# Patient Record
Sex: FEMALE | Race: WHITE | NOT HISPANIC OR LATINO | Employment: OTHER | URBAN - METROPOLITAN AREA
[De-identification: names, ages, dates, MRNs, and addresses within clinical notes are randomized per-mention and may not be internally consistent; named-entity substitution may affect disease eponyms.]

---

## 2019-09-20 ENCOUNTER — OFFICE VISIT (OUTPATIENT)
Dept: FAMILY MEDICINE CLINIC | Facility: CLINIC | Age: 69
End: 2019-09-20

## 2019-09-20 VITALS
TEMPERATURE: 98.9 F | BODY MASS INDEX: 22.68 KG/M2 | HEART RATE: 92 BPM | RESPIRATION RATE: 16 BRPM | SYSTOLIC BLOOD PRESSURE: 120 MMHG | OXYGEN SATURATION: 96 % | HEIGHT: 63 IN | DIASTOLIC BLOOD PRESSURE: 82 MMHG | WEIGHT: 128 LBS

## 2019-09-20 DIAGNOSIS — E03.9 ACQUIRED HYPOTHYROIDISM: ICD-10-CM

## 2019-09-20 DIAGNOSIS — E78.2 MIXED HYPERLIPIDEMIA: ICD-10-CM

## 2019-09-20 DIAGNOSIS — M89.9 DISORDER OF BONE: ICD-10-CM

## 2019-09-20 DIAGNOSIS — J43.9 PULMONARY EMPHYSEMA, UNSPECIFIED EMPHYSEMA TYPE (HCC): ICD-10-CM

## 2019-09-20 DIAGNOSIS — Z23 NEED FOR INFLUENZA VACCINATION: Primary | ICD-10-CM

## 2019-09-20 DIAGNOSIS — L94.2 CALCINOSIS CUTIS: ICD-10-CM

## 2019-09-20 DIAGNOSIS — Z13.21 ENCOUNTER FOR VITAMIN DEFICIENCY SCREENING: ICD-10-CM

## 2019-09-20 PROBLEM — M81.0 OSTEOPOROSIS WITHOUT CURRENT PATHOLOGICAL FRACTURE: Status: ACTIVE | Noted: 2019-03-29

## 2019-09-20 PROBLEM — R60.9 EDEMA: Status: ACTIVE | Noted: 2018-08-06

## 2019-09-20 LAB
ALBUMIN SERPL-MCNC: 4.2 G/DL (ref 3.5–4.8)
ALBUMIN/GLOB SERPL: 1.4 G/DL (ref 1–1.7)
ALP SERPL-CCNC: 53 U/L (ref 32–91)
ALT SERPL W P-5'-P-CCNC: 19 U/L (ref 14–54)
ANION GAP SERPL CALCULATED.3IONS-SCNC: 15.8 MMOL/L (ref 5–15)
ARTICHOKE IGE QN: 105 MG/DL (ref 0–100)
AST SERPL-CCNC: 28 U/L (ref 15–41)
BILIRUB SERPL-MCNC: 0.6 MG/DL (ref 0.3–1.2)
BUN BLD-MCNC: 9 MG/DL (ref 8–20)
BUN/CREAT SERPL: 15 (ref 5.4–26.2)
CALCIUM SPEC-SCNC: 9.3 MG/DL (ref 8.9–10.3)
CHLORIDE SERPL-SCNC: 98 MMOL/L (ref 101–111)
CHOLEST SERPL-MCNC: 166 MG/DL
CO2 SERPL-SCNC: 28 MMOL/L (ref 22–32)
CREAT BLD-MCNC: 0.6 MG/DL (ref 0.4–1)
GFR SERPL CREATININE-BSD FRML MDRD: 99 ML/MIN/1.73
GLOBULIN UR ELPH-MCNC: 2.9 GM/DL (ref 2.5–3.8)
GLUCOSE BLD-MCNC: 99 MG/DL (ref 65–99)
HDLC SERPL QL: 3.25
HDLC SERPL-MCNC: 51 MG/DL
LDLC/HDLC SERPL: 1.9 {RATIO}
POTASSIUM BLD-SCNC: 4.8 MMOL/L (ref 3.6–5.1)
PROT SERPL-MCNC: 7.1 G/DL (ref 6.1–7.9)
SODIUM BLD-SCNC: 137 MMOL/L (ref 136–144)
T4 FREE SERPL-MCNC: 1.35 NG/DL (ref 0.58–1.64)
TRIGL SERPL-MCNC: 90 MG/DL
TSH SERPL DL<=0.05 MIU/L-ACNC: 0.99 UIU/ML (ref 0.34–5.6)
VLDLC SERPL-MCNC: 18 MG/DL

## 2019-09-20 PROCEDURE — 84439 ASSAY OF FREE THYROXINE: CPT | Performed by: FAMILY MEDICINE

## 2019-09-20 PROCEDURE — G0008 ADMIN INFLUENZA VIRUS VAC: HCPCS | Performed by: FAMILY MEDICINE

## 2019-09-20 PROCEDURE — 82306 VITAMIN D 25 HYDROXY: CPT | Performed by: FAMILY MEDICINE

## 2019-09-20 PROCEDURE — 36415 COLL VENOUS BLD VENIPUNCTURE: CPT | Performed by: FAMILY MEDICINE

## 2019-09-20 PROCEDURE — 80053 COMPREHEN METABOLIC PANEL: CPT | Performed by: FAMILY MEDICINE

## 2019-09-20 PROCEDURE — 84443 ASSAY THYROID STIM HORMONE: CPT | Performed by: FAMILY MEDICINE

## 2019-09-20 PROCEDURE — 90653 IIV ADJUVANT VACCINE IM: CPT | Performed by: FAMILY MEDICINE

## 2019-09-20 PROCEDURE — 99203 OFFICE O/P NEW LOW 30 MIN: CPT | Performed by: FAMILY MEDICINE

## 2019-09-20 PROCEDURE — 80061 LIPID PANEL: CPT | Performed by: FAMILY MEDICINE

## 2019-09-20 RX ORDER — HYDROCODONE BITARTRATE AND ACETAMINOPHEN 7.5; 325 MG/1; MG/1
1 TABLET ORAL EVERY 6 HOURS PRN
COMMUNITY
Start: 2019-09-12 | End: 2019-09-20 | Stop reason: SDUPTHER

## 2019-09-20 RX ORDER — LEVOTHYROXINE SODIUM 0.1 MG/1
1 TABLET ORAL DAILY
COMMUNITY
Start: 2018-12-14 | End: 2019-10-24 | Stop reason: SDUPTHER

## 2019-09-20 RX ORDER — OMEPRAZOLE 20 MG/1
1 CAPSULE, DELAYED RELEASE ORAL DAILY
COMMUNITY
Start: 2019-06-18 | End: 2019-10-24 | Stop reason: SDUPTHER

## 2019-09-20 RX ORDER — ALBUTEROL SULFATE 90 UG/1
AEROSOL, METERED RESPIRATORY (INHALATION)
Refills: 0 | COMMUNITY
Start: 2019-09-09 | End: 2019-12-20 | Stop reason: SDUPTHER

## 2019-09-20 RX ORDER — HYDROCODONE BITARTRATE AND ACETAMINOPHEN 7.5; 325 MG/1; MG/1
1 TABLET ORAL EVERY 6 HOURS PRN
Qty: 120 TABLET | Refills: 0 | Status: SHIPPED | OUTPATIENT
Start: 2019-09-20 | End: 2019-10-21

## 2019-09-20 RX ORDER — FEXOFENADINE HCL 180 MG/1
1 TABLET ORAL DAILY
COMMUNITY
End: 2019-10-24

## 2019-09-20 RX ORDER — SIMVASTATIN 10 MG
1 TABLET ORAL NIGHTLY
COMMUNITY
Start: 2018-12-14 | End: 2019-10-24 | Stop reason: SDUPTHER

## 2019-09-23 LAB — 25(OH)D3 SERPL-MCNC: 56.6 NG/ML (ref 30–100)

## 2019-09-23 RX ORDER — MULTIVIT-MIN/IRON/FOLIC ACID/K 18-600-40
2000 CAPSULE ORAL DAILY
Qty: 30 CAPSULE | Refills: 3
Start: 2019-09-23 | End: 2019-12-20

## 2019-10-24 ENCOUNTER — OFFICE VISIT (OUTPATIENT)
Dept: FAMILY MEDICINE CLINIC | Facility: CLINIC | Age: 69
End: 2019-10-24

## 2019-10-24 VITALS
DIASTOLIC BLOOD PRESSURE: 85 MMHG | HEART RATE: 93 BPM | BODY MASS INDEX: 22.68 KG/M2 | WEIGHT: 128 LBS | RESPIRATION RATE: 18 BRPM | OXYGEN SATURATION: 94 % | SYSTOLIC BLOOD PRESSURE: 122 MMHG | HEIGHT: 63 IN | TEMPERATURE: 98 F

## 2019-10-24 DIAGNOSIS — Z87.891 PERSONAL HISTORY OF NICOTINE DEPENDENCE: ICD-10-CM

## 2019-10-24 DIAGNOSIS — Z12.2 ENCOUNTER FOR SCREENING FOR LUNG CANCER: ICD-10-CM

## 2019-10-24 DIAGNOSIS — M53.3 SACROILIAC JOINT PAIN: ICD-10-CM

## 2019-10-24 DIAGNOSIS — Z12.31 ENCOUNTER FOR SCREENING MAMMOGRAM FOR BREAST CANCER: ICD-10-CM

## 2019-10-24 DIAGNOSIS — J06.9 ACUTE URI: Primary | ICD-10-CM

## 2019-10-24 PROCEDURE — 99213 OFFICE O/P EST LOW 20 MIN: CPT | Performed by: FAMILY MEDICINE

## 2019-10-24 RX ORDER — SIMVASTATIN 10 MG
10 TABLET ORAL NIGHTLY
Qty: 90 TABLET | Refills: 3 | Status: SHIPPED | OUTPATIENT
Start: 2019-10-24 | End: 2019-12-20 | Stop reason: SDUPTHER

## 2019-10-24 RX ORDER — HYDROCODONE BITARTRATE AND ACETAMINOPHEN 7.5; 325 MG/1; MG/1
1 TABLET ORAL EVERY 6 HOURS PRN
Qty: 120 TABLET | Refills: 0 | Status: SHIPPED | OUTPATIENT
Start: 2019-10-24 | End: 2019-10-24 | Stop reason: SDUPTHER

## 2019-10-24 RX ORDER — HYDROCODONE BITARTRATE AND ACETAMINOPHEN 7.5; 325 MG/1; MG/1
1 TABLET ORAL EVERY 6 HOURS PRN
Qty: 120 TABLET | Refills: 0 | Status: SHIPPED | OUTPATIENT
Start: 2019-10-24 | End: 2019-12-20 | Stop reason: SDUPTHER

## 2019-10-24 RX ORDER — FEXOFENADINE HCL AND PSEUDOEPHEDRINE HCI 60; 120 MG/1; MG/1
1 TABLET, EXTENDED RELEASE ORAL 2 TIMES DAILY PRN
Qty: 180 TABLET | Refills: 0 | Status: SHIPPED | OUTPATIENT
Start: 2019-10-24 | End: 2020-05-26

## 2019-10-24 RX ORDER — OMEPRAZOLE 20 MG/1
20 CAPSULE, DELAYED RELEASE ORAL DAILY
Qty: 90 CAPSULE | Refills: 1 | Status: SHIPPED | OUTPATIENT
Start: 2019-10-24 | End: 2019-12-20 | Stop reason: SDUPTHER

## 2019-10-24 RX ORDER — LEVOTHYROXINE SODIUM 0.1 MG/1
100 TABLET ORAL DAILY
Qty: 90 TABLET | Refills: 3 | Status: SHIPPED | OUTPATIENT
Start: 2019-10-24 | End: 2020-10-23 | Stop reason: SDUPTHER

## 2019-10-24 NOTE — PROGRESS NOTES
Subjective   Eva Borrego is a 69 y.o. female.     Chief Complaint   Patient presents with   • Neck Pain     neck pain all started 3-4 days ago. Yesterday morning she woke up and her eyes were stuck shut.         Current Outpatient Medications:   •  Cholecalciferol (VITAMIN D) 2000 units capsule, Take 1 capsule by mouth Daily., Disp: 30 capsule, Rfl: 3  •  denosumab (PROLIA) 60 MG/ML solution prefilled syringe syringe, Inject  under the skin into the appropriate area as directed. Inject SubQ every 6 months, Disp: , Rfl:   •  levothyroxine (SYNTHROID, LEVOTHROID) 100 MCG tablet, Take 1 tablet by mouth Daily., Disp: 90 tablet, Rfl: 3  •  multivitamin-minerals (CENTRUM) tablet, Take 1 tablet by mouth Daily., Disp: , Rfl:   •  omeprazole (priLOSEC) 20 MG capsule, Take 1 capsule by mouth Daily., Disp: 90 capsule, Rfl: 1  •  simvastatin (ZOCOR) 10 MG tablet, Take 1 tablet by mouth Every Night., Disp: 90 tablet, Rfl: 3  •  VENTOLIN  (90 Base) MCG/ACT inhaler, Inhale 2 puffs by mouth every 6 hours, Disp: , Rfl: 0  •  fexofenadine-pseudoephedrine (ALLEGRA-D)  MG per 12 hr tablet, Take 1 tablet by mouth 2 (Two) Times a Day As Needed for Allergies., Disp: 180 tablet, Rfl: 0  •  HYDROcodone-acetaminophen (NORCO) 7.5-325 MG per tablet, Take 1 tablet by mouth Every 6 (Six) Hours As Needed for Severe Pain ., Disp: 120 tablet, Rfl: 0    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   • COPD (chronic obstructive pulmonary disease) (CMS/[facility]) 4/16/2014   • GERD (gastroesophageal reflux disease)    • Hypothyroidism 4/18/2012    Overview:  IMO clean-up   • MAMMO     NEG = 2018   • 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 (CMS/[facility]) 10/06/2016   • Positive GURINDER (antinuclear antibody) 6/15/2016   • Pure hypercholesterolemia 4/18/2012   • Sacroiliac joint pain 9/13/2016   • Telangiectasia 6/15/2016   • VACCINES     PNA 23/ Flu= 2018/ 2019   • Vertigo  2011       Past Surgical History:   Procedure Laterality Date   • CHOLECYSTECTOMY     • COLONOSCOPY      2018= TA, rech      • EYE SURGERY      B/L Cataract/ Lens Lt   • JOINT REPLACEMENT Right     THR---2017   • SPINE SURGERY      Discectomy Lumbar   • TONSILLECTOMY         Family History   Problem Relation Age of Onset   • Diabetes Mother    • Hyperlipidemia Father    • Aortic aneurysm Father    • Arthritis Maternal Grandmother    • Osteoporosis Maternal Grandmother    • Stroke Paternal Grandfather    • Alcohol abuse Sister    • Kidney disease Daughter        Social History     Socioeconomic History   • Marital status:      Spouse name: Not on file   • Number of children: Not on file   • Years of education: Not on file   • Highest education level: Not on file   Tobacco Use   • Smoking status: Former Smoker     Packs/day: 1.00     Years: 40.00     Pack years: 40.00     Types: Cigarettes     Last attempt to quit: 10/11/2012     Years since quittin.0   • Smokeless tobacco: Never Used   • Tobacco comment: Vapes 3mL-8   Substance and Sexual Activity   • Alcohol use: No     Frequency: Never   • Drug use: No   • Sexual activity: Defer       68 y/o C female here w/ c/o's pain at sides of neck and woke up w/ her eye matted shut yest (but not today)    Pt got her letter that she is due for her mammo and CT Lung scan and is overdue for her Prolia shot         The following portions of the patient's history were reviewed and updated as appropriate: allergies, current medications, past family history, past medical history, past social history, past surgical history and problem list.    Review of Systems   Constitutional: Negative for chills, fatigue and fever.   HENT: Negative for congestion, ear discharge, ear pain, postnasal drip, rhinorrhea, sinus pressure, sneezing, sore throat and swollen glands.    Eyes: Negative for pain, discharge, redness, itching and visual disturbance.   Respiratory:  Negative for cough, shortness of breath, wheezing and stridor.    Musculoskeletal: Positive for neck pain. Negative for neck stiffness.   Skin: Negative for rash.   Allergic/Immunologic: Negative for environmental allergies.   Psychiatric/Behavioral: Negative for sleep disturbance.       Vitals:    10/24/19 1006   BP: 122/85   Pulse: 93   Resp: 18   Temp: 98 °F (36.7 °C)   SpO2: 94%       Objective   Physical Exam   Constitutional: She is oriented to person, place, and time. She appears well-developed and well-nourished. No distress.   HENT:   Head: Normocephalic and atraumatic.   Right Ear: External ear normal.   Left Ear: External ear normal.   Nose: Nose normal.   Mouth/Throat: Oropharynx is clear and moist. No oropharyngeal exudate.   Eyes: Conjunctivae and EOM are normal. Pupils are equal, round, and reactive to light. Right eye exhibits no discharge. Left eye exhibits no discharge. No scleral icterus.   Neck: Normal range of motion. Neck supple. No thyromegaly present.   Cardiovascular: Normal rate, regular rhythm, normal heart sounds and intact distal pulses.   No murmur heard.  Pulmonary/Chest: Effort normal and breath sounds normal. No respiratory distress. She has no wheezes. She has no rales.   Abdominal: Soft. Bowel sounds are normal.   Lymphadenopathy:     She has no cervical adenopathy.   Neurological: She is alert and oriented to person, place, and time. No cranial nerve deficit.   Skin: Skin is warm and dry. No rash noted. She is not diaphoretic. No erythema. No pallor.   Psychiatric: She has a normal mood and affect. Her behavior is normal. Judgment and thought content normal.   Nursing note and vitals reviewed.        Assessment/Plan   Eva was seen today for neck pain.    Diagnoses and all orders for this visit:    Acute URI    Encounter for screening mammogram for breast cancer  -     Mammo Screening Digital Tomosynthesis Bilateral With CAD; Future    Encounter for screening for lung cancer  -      CT Chest Low Dose Wo; Future    Personal history of nicotine dependence   -     CT Chest Low Dose Wo; Future    Other orders  -     HYDROcodone-acetaminophen (NORCO) 7.5-325 MG per tablet; Take 1 tablet by mouth Every 6 (Six) Hours As Needed for Severe Pain .  -     levothyroxine (SYNTHROID, LEVOTHROID) 100 MCG tablet; Take 1 tablet by mouth Daily.  -     omeprazole (priLOSEC) 20 MG capsule; Take 1 capsule by mouth Daily.  -     simvastatin (ZOCOR) 10 MG tablet; Take 1 tablet by mouth Every Night.  -     fexofenadine-pseudoephedrine (ALLEGRA-D)  MG per 12 hr tablet; Take 1 tablet by mouth 2 (Two) Times a Day As Needed for Allergies.    PT to try otc symptom control and call if not improving or worsening

## 2019-10-24 NOTE — TELEPHONE ENCOUNTER
[facility] called - pt has never filled Hydrocodone here before, so they can only fill x 1 wk. Recom sending it to [facility] in [place], since that's where it was filled last. Pt agrees. Please resend.

## 2019-11-03 NOTE — PROGRESS NOTES
Subjective   Eva Borrego is a 69 y.o. female.     Chief Complaint   Patient presents with   • Establish Care         Current Outpatient Medications:   •  denosumab (PROLIA) 60 MG/ML solution prefilled syringe syringe, Inject  under the skin into the appropriate area as directed. Inject SubQ every 6 months, Disp: , Rfl:   •  multivitamin-minerals (CENTRUM) tablet, Take 1 tablet by mouth Daily., Disp: , Rfl:   •  VENTOLIN  (90 Base) MCG/ACT inhaler, Inhale 2 puffs by mouth every 6 hours, Disp: , Rfl: 0  •  Cholecalciferol (VITAMIN D) 2000 units capsule, Take 1 capsule by mouth Daily., Disp: 30 capsule, Rfl: 3  •  fexofenadine-pseudoephedrine (ALLEGRA-D)  MG per 12 hr tablet, Take 1 tablet by mouth 2 (Two) Times a Day As Needed for Allergies., Disp: 180 tablet, Rfl: 0  •  HYDROcodone-acetaminophen (NORCO) 7.5-325 MG per tablet, Take 1 tablet by mouth Every 6 (Six) Hours As Needed for Severe Pain ., Disp: 120 tablet, Rfl: 0  •  levothyroxine (SYNTHROID, LEVOTHROID) 100 MCG tablet, Take 1 tablet by mouth Daily., Disp: 90 tablet, Rfl: 3  •  omeprazole (priLOSEC) 20 MG capsule, Take 1 capsule by mouth Daily., Disp: 90 capsule, Rfl: 1  •  simvastatin (ZOCOR) 10 MG tablet, Take 1 tablet by mouth Every Night., Disp: 90 tablet, Rfl: 3    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   • COPD (chronic obstructive pulmonary disease) (CMS/[facility]) 4/16/2014   • GERD (gastroesophageal reflux disease)    • Hypothyroidism 4/18/2012    Overview:  IMO clean-up   • MAMMO     NEG = 2018   • 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 (CMS/[facility]) 10/06/2016   • Positive GURINDER (antinuclear antibody) 6/15/2016   • Pure hypercholesterolemia 4/18/2012   • Sacroiliac joint pain 9/13/2016   • Telangiectasia 6/15/2016   • VACCINES     PNA 23/ Flu= 2018/ 2019   • Vertigo 12/14/2011       Past Surgical History:   Procedure Laterality Date   • CHOLECYSTECTOMY     •  COLONOSCOPY      2018= TA, rech      • EYE SURGERY      B/L Cataract/ Lens Lt   • JOINT REPLACEMENT Right     THR---2017   • SPINE SURGERY      Discectomy Lumbar   • TONSILLECTOMY         Family History   Problem Relation Age of Onset   • Diabetes Mother    • Hyperlipidemia Father    • Aortic aneurysm Father    • Arthritis Maternal Grandmother    • Osteoporosis Maternal Grandmother    • Stroke Paternal Grandfather    • Alcohol abuse Sister    • Kidney disease Daughter        Social History     Socioeconomic History   • Marital status:      Spouse name: Not on file   • Number of children: Not on file   • Years of education: Not on file   • Highest education level: Not on file   Tobacco Use   • Smoking status: Former Smoker     Packs/day: 1.00     Years: 40.00     Pack years: 40.00     Types: Cigarettes     Last attempt to quit: 10/11/2012     Years since quittin.0   • Smokeless tobacco: Never Used   • Tobacco comment: Vapes 3mL-8   Substance and Sexual Activity   • Alcohol use: No     Frequency: Never   • Drug use: No   • Sexual activity: Defer       68 y/o C female here to est care w/ hx/o Hypothyroid/ GERD/ COPD/ CHOL    Pt w/o new c/o's and mirta meds/ doses w/o difficulty---needs referral to DERM for her skin condition         The following portions of the patient's history were reviewed and updated as appropriate: allergies, current medications, past family history, past medical history, past social history, past surgical history and problem list.    Review of Systems   Constitutional: Negative for activity change, fatigue and unexpected weight gain.   Respiratory: Negative for cough, chest tightness and shortness of breath.    Cardiovascular: Negative for chest pain, palpitations and leg swelling.   Genitourinary: Negative for frequency, urgency and urinary incontinence.   Musculoskeletal: Negative for arthralgias and myalgias.   Neurological: Negative for dizziness, facial asymmetry, speech  difficulty, weakness, light-headedness, headache, memory problem and confusion.       Vitals:    09/20/19 1021   BP: 120/82   Pulse: 92   Resp: 16   Temp: 98.9 °F (37.2 °C)   SpO2: 96%       Objective   Physical Exam   Constitutional: She is oriented to person, place, and time. She appears well-developed and well-nourished.   HENT:   Head: Normocephalic and atraumatic.   Neck: Normal range of motion. Neck supple.   Cardiovascular: Normal rate, regular rhythm, normal heart sounds and intact distal pulses.   No murmur heard.  Pulmonary/Chest: Effort normal and breath sounds normal.   Musculoskeletal: She exhibits no edema.   Neurological: She is alert and oriented to person, place, and time. No cranial nerve deficit.   Skin: Skin is warm and dry. Rash noted.   Psychiatric: She has a normal mood and affect. Her behavior is normal. Judgment and thought content normal.   Nursing note and vitals reviewed.        Assessment/Plan   Eva was seen today for establish care.    Diagnoses and all orders for this visit:    Need for influenza vaccination  -     Fluad Quad >65 years ([id number])    Pulmonary emphysema, unspecified emphysema type (CMS/[facility])    Calcinosis cutis  -     Ambulatory Referral to Dermatology  -     Vitamin D 25 Hydroxy; Future  -     Vitamin D 25 Hydroxy    Acquired hypothyroidism  -     T4, Free; Future  -     TSH; Future  -     T4, Free  -     TSH    Mixed hyperlipidemia  -     Lipid Panel; Future  -     Comprehensive Metabolic Panel; Future  -     Lipid Panel  -     Comprehensive Metabolic Panel    Disorder of bone   -     Vitamin D 25 Hydroxy; Future  -     Vitamin D 25 Hydroxy    Encounter for vitamin deficiency screening    Other orders  -     Discontinue: HYDROcodone-acetaminophen (NORCO) 7.5-325 MG per tablet; Take 1 tablet by mouth Every 6 (Six) Hours As Needed for Severe Pain  for up to 31 days.

## 2019-11-12 ENCOUNTER — HOSPITAL ENCOUNTER (OUTPATIENT)
Dept: PET IMAGING | Facility: HOSPITAL | Age: 69
Discharge: HOME | End: 2019-11-12
Admitting: FAMILY MEDICINE

## 2019-11-12 DIAGNOSIS — Z12.2 ENCOUNTER FOR SCREENING FOR LUNG CANCER: ICD-10-CM

## 2019-11-12 DIAGNOSIS — Z87.891 PERSONAL HISTORY OF NICOTINE DEPENDENCE: ICD-10-CM

## 2019-11-12 PROCEDURE — G0297 LDCT FOR LUNG CA SCREEN: HCPCS

## 2019-11-14 ENCOUNTER — HOSPITAL ENCOUNTER (OUTPATIENT)
Dept: MAMMOGRAPHY | Facility: HOSPITAL | Age: 69
Discharge: HOME | End: 2019-11-14
Admitting: FAMILY MEDICINE

## 2019-11-14 DIAGNOSIS — Z12.31 ENCOUNTER FOR SCREENING MAMMOGRAM FOR BREAST CANCER: ICD-10-CM

## 2019-11-14 PROCEDURE — 77067 SCR MAMMO BI INCL CAD: CPT

## 2019-11-14 PROCEDURE — 77063 BREAST TOMOSYNTHESIS BI: CPT

## 2019-11-20 ENCOUNTER — HOSPITAL ENCOUNTER (OUTPATIENT)
Dept: OTHER | Facility: HOSPITAL | Age: 69
Discharge: HOME | End: 2019-11-20

## 2019-11-20 DIAGNOSIS — Z12.2 ENCOUNTER FOR SCREENING FOR LUNG CANCER: Primary | ICD-10-CM

## 2019-11-20 DIAGNOSIS — R91.8 MULTIPLE LUNG NODULES ON CT: ICD-10-CM

## 2019-11-20 DIAGNOSIS — Z00.6 EXAMINATION FOR NORMAL COMPARISON OR CONTROL IN CLINICAL RESEARCH: ICD-10-CM

## 2019-11-20 DIAGNOSIS — F17.211 NICOTINE DEPENDENCE, CIGARETTES, IN REMISSION: ICD-10-CM

## 2019-11-25 NOTE — PROGRESS NOTES
Is this CT order supposed to be for next year as the annual check?   Bc pt just had one done 10/24.. ?

## 2019-12-20 ENCOUNTER — OFFICE VISIT (OUTPATIENT)
Dept: FAMILY MEDICINE CLINIC | Facility: CLINIC | Age: 69
End: 2019-12-20

## 2019-12-20 VITALS
OXYGEN SATURATION: 95 % | DIASTOLIC BLOOD PRESSURE: 84 MMHG | TEMPERATURE: 98.2 F | SYSTOLIC BLOOD PRESSURE: 126 MMHG | HEART RATE: 108 BPM | WEIGHT: 127 LBS | HEIGHT: 63 IN | BODY MASS INDEX: 22.5 KG/M2 | RESPIRATION RATE: 16 BRPM

## 2019-12-20 DIAGNOSIS — Z51.81 ENCOUNTER FOR THERAPEUTIC DRUG LEVEL MONITORING: ICD-10-CM

## 2019-12-20 DIAGNOSIS — M53.3 SACROILIAC JOINT PAIN: ICD-10-CM

## 2019-12-20 DIAGNOSIS — J44.1 COPD EXACERBATION (HCC): Primary | ICD-10-CM

## 2019-12-20 DIAGNOSIS — J06.9 ACUTE URI: ICD-10-CM

## 2019-12-20 LAB
POC AMPHETAMINES: NEGATIVE
POC BARBITURATES: NEGATIVE
POC BENZODIAZEPHINES: NEGATIVE
POC COCAINE: NEGATIVE
POC METHADONE: NEGATIVE
POC METHAMPHETAMINE SCREEN URINE: NEGATIVE
POC OPIATES: POSITIVE
POC OXYCODONE: NEGATIVE
POC PHENCYCLIDINE: NEGATIVE
POC PROPOXYPHENE: NEGATIVE
POC THC: NEGATIVE
POC TRICYCLIC ANTIDEPRESSANTS: NEGATIVE

## 2019-12-20 PROCEDURE — 80305 DRUG TEST PRSMV DIR OPT OBS: CPT | Performed by: FAMILY MEDICINE

## 2019-12-20 PROCEDURE — 99213 OFFICE O/P EST LOW 20 MIN: CPT | Performed by: FAMILY MEDICINE

## 2019-12-20 RX ORDER — DEXTROMETHORPHAN HYDROBROMIDE AND PROMETHAZINE HYDROCHLORIDE 15; 6.25 MG/5ML; MG/5ML
5 SYRUP ORAL 4 TIMES DAILY PRN
Qty: 120 ML | Refills: 0 | Status: SHIPPED | OUTPATIENT
Start: 2019-12-20 | End: 2020-09-22

## 2019-12-20 RX ORDER — PREDNISONE 20 MG/1
TABLET ORAL
Qty: 20 TABLET | Refills: 0 | Status: SHIPPED | OUTPATIENT
Start: 2019-12-20 | End: 2020-02-13

## 2019-12-20 RX ORDER — OMEPRAZOLE 20 MG/1
20 CAPSULE, DELAYED RELEASE ORAL DAILY
Qty: 90 CAPSULE | Refills: 1 | Status: SHIPPED | OUTPATIENT
Start: 2019-12-20 | End: 2020-02-13 | Stop reason: SDUPTHER

## 2019-12-20 RX ORDER — ALBUTEROL SULFATE 90 UG/1
2 AEROSOL, METERED RESPIRATORY (INHALATION) EVERY 4 HOURS PRN
Qty: 18 G | Refills: 0 | Status: SHIPPED | OUTPATIENT
Start: 2019-12-20 | End: 2019-12-20

## 2019-12-20 RX ORDER — HYDROCODONE BITARTRATE AND ACETAMINOPHEN 7.5; 325 MG/1; MG/1
1 TABLET ORAL EVERY 6 HOURS PRN
Qty: 120 TABLET | Refills: 0 | Status: SHIPPED | OUTPATIENT
Start: 2019-12-20 | End: 2020-02-13 | Stop reason: SDUPTHER

## 2019-12-20 RX ORDER — SIMVASTATIN 10 MG
10 TABLET ORAL NIGHTLY
Qty: 90 TABLET | Refills: 3 | Status: SHIPPED | OUTPATIENT
Start: 2019-12-20 | End: 2020-10-23 | Stop reason: SDUPTHER

## 2019-12-20 RX ORDER — ALBUTEROL SULFATE 90 UG/1
2 AEROSOL, METERED RESPIRATORY (INHALATION) EVERY 4 HOURS PRN
Qty: 18 G | Refills: 0 | Status: SHIPPED | OUTPATIENT
Start: 2019-12-20 | End: 2019-12-26 | Stop reason: SDUPTHER

## 2019-12-26 ENCOUNTER — TELEPHONE (OUTPATIENT)
Dept: FAMILY MEDICINE CLINIC | Facility: CLINIC | Age: 69
End: 2019-12-26

## 2019-12-26 RX ORDER — ALBUTEROL SULFATE 90 UG/1
2 AEROSOL, METERED RESPIRATORY (INHALATION) EVERY 6 HOURS PRN
Qty: 18 G | Refills: 0 | Status: SHIPPED | OUTPATIENT
Start: 2019-12-26 | End: 2020-02-14

## 2019-12-26 RX ORDER — ALBUTEROL SULFATE 90 UG/1
2 AEROSOL, METERED RESPIRATORY (INHALATION) EVERY 6 HOURS PRN
Qty: 18 G | Refills: 0 | Status: SHIPPED | OUTPATIENT
Start: 2019-12-26 | End: 2019-12-26 | Stop reason: SDUPTHER

## 2019-12-26 NOTE — TELEPHONE ENCOUNTER
[facility] Albuterol HFA -states it says both 2 puff q4hr and 2 puffs q6hrs. Please verify how often pt needs it.    [phone]

## 2020-02-13 ENCOUNTER — OFFICE VISIT (OUTPATIENT)
Dept: FAMILY MEDICINE CLINIC | Facility: CLINIC | Age: 70
End: 2020-02-13

## 2020-02-13 VITALS
BODY MASS INDEX: 22.32 KG/M2 | OXYGEN SATURATION: 94 % | HEIGHT: 63 IN | WEIGHT: 126 LBS | RESPIRATION RATE: 18 BRPM | SYSTOLIC BLOOD PRESSURE: 143 MMHG | HEART RATE: 112 BPM | TEMPERATURE: 98.3 F | DIASTOLIC BLOOD PRESSURE: 91 MMHG

## 2020-02-13 DIAGNOSIS — M53.3 SACROILIAC JOINT PAIN: ICD-10-CM

## 2020-02-13 DIAGNOSIS — J06.9 ACUTE URI: Primary | ICD-10-CM

## 2020-02-13 DIAGNOSIS — F43.21 GRIEF: ICD-10-CM

## 2020-02-13 DIAGNOSIS — I87.2 VENOUS INSUFFICIENCY OF BOTH LOWER EXTREMITIES: ICD-10-CM

## 2020-02-13 PROCEDURE — 99214 OFFICE O/P EST MOD 30 MIN: CPT | Performed by: FAMILY MEDICINE

## 2020-02-13 RX ORDER — OMEPRAZOLE 20 MG/1
20 CAPSULE, DELAYED RELEASE ORAL DAILY
Qty: 90 CAPSULE | Refills: 1 | Status: SHIPPED | OUTPATIENT
Start: 2020-02-13 | End: 2020-09-22 | Stop reason: SDUPTHER

## 2020-02-13 RX ORDER — HYDROCODONE BITARTRATE AND ACETAMINOPHEN 7.5; 325 MG/1; MG/1
1 TABLET ORAL EVERY 6 HOURS PRN
Qty: 120 TABLET | Refills: 0 | Status: SHIPPED | OUTPATIENT
Start: 2020-02-13 | End: 2020-04-21 | Stop reason: SDUPTHER

## 2020-02-13 NOTE — PROGRESS NOTES
Subjective   Eva Borrego is a 69 y.o. female.     Chief Complaint   Patient presents with   • Joint Swelling     ankle swelling x 2 weeks    • Nasal Congestion     cough,congestion-patient states that she feels like it is bronchitis. She also has a sore on her nose.          Current Outpatient Medications:   •  Cholecalciferol (VITAMIN D3) 125 MCG (5000 UT) capsule capsule, Take 5,000 Units by mouth. 1-2 tablets by mouth weekly, Disp: , Rfl:   •  fexofenadine-pseudoephedrine (ALLEGRA-D)  MG per 12 hr tablet, Take 1 tablet by mouth 2 (Two) Times a Day As Needed for Allergies., Disp: 180 tablet, Rfl: 0  •  HYDROcodone-acetaminophen (NORCO) 7.5-325 MG per tablet, Take 1 tablet by mouth Every 6 (Six) Hours As Needed for Severe Pain ., Disp: 120 tablet, Rfl: 0  •  levothyroxine (SYNTHROID, LEVOTHROID) 100 MCG tablet, Take 1 tablet by mouth Daily., Disp: 90 tablet, Rfl: 3  •  multivitamin-minerals (CENTRUM) tablet, Take 1 tablet by mouth Daily., Disp: , Rfl:   •  omeprazole (priLOSEC) 20 MG capsule, Take 1 capsule by mouth Daily., Disp: 90 capsule, Rfl: 1  •  promethazine-dextromethorphan (PROMETHAZINE-DM) 6.25-15 MG/5ML syrup, Take 5 mL by mouth 4 (Four) Times a Day As Needed for Cough., Disp: 120 mL, Rfl: 0  •  simvastatin (ZOCOR) 10 MG tablet, Take 1 tablet by mouth Every Night., Disp: 90 tablet, Rfl: 3  •  denosumab (PROLIA) 60 MG/ML solution prefilled syringe syringe, Inject  under the skin into the appropriate area as directed. 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 (CMS/[facility]) 10/06/2016   • Positive GURINDER (antinuclear antibody) 6/15/2016   • Pure hypercholesterolemia 2012   • Sacroiliac joint pain 2016   • Telangiectasia 6/15/2016   • VACCINES     PNA 23/ Flu= 2019   • Vertigo 2011       Past Surgical History:   Procedure Laterality Date   • CHOLECYSTECTOMY     • COLONOSCOPY      = TA, rech      • EYE SURGERY      B/L Cataract/ Lens Lt   • JOINT REPLACEMENT Right     THR---2017   • SPINE SURGERY      Discectomy Lumbar   • TONSILLECTOMY         Family History   Problem Relation Age of Onset   • Diabetes Mother    • Hyperlipidemia Father    • Aortic aneurysm Father    • Arthritis Maternal Grandmother    • Osteoporosis Maternal Grandmother    • Stroke Paternal Grandfather    • Alcohol abuse Sister    • Kidney disease Daughter        Social History     Socioeconomic History   • Marital status:      Spouse name: Not on file   • Number of children: Not on file   • Years of education: Not on file   • Highest education level: Not on file   Tobacco Use   • Smoking status: Former Smoker     Packs/day: 1.00     Years: 40.00     Pack years: 40.00     Types: Cigarettes     Last attempt to quit: 10/11/2012     Years since quittin.3   • Smokeless tobacco: Never Used   • Tobacco comment: Vapes 3mL-8   Substance and Sexual Activity   • Alcohol use: No     Frequency: Never   • Drug use: No   • Sexual activity: Defer       68y/o C female here w/ c/o's URI and new LE swelling    Pt states her  passed away----he became confused one day and they took him to the hospital but he never got more lucid and was unconscious for last 2 days  Pt states she is just trying to adjust since he passed......she is still living w/ her son and they want to take her to [place] but she doesn't want to go......    PT states she has been sitting around a lot w/ her feet hanging down enio while in the hsop w/  her ...... This is when her legs started swelling; no pain w/ amb and no erythema    Pt also now having probs w/ a persistent cough and pain in bronchials w/ cough       The following portions of the patient's history were reviewed and updated as appropriate: allergies, current medications, past family history, past medical history, past social history, past surgical history and problem list.    Review of Systems   Constitutional: Negative for chills, fatigue and fever.   HENT: Positive for congestion and rhinorrhea. Negative for ear discharge, ear pain, postnasal drip, sinus pressure, sneezing, sore throat and swollen glands.    Eyes: Negative for pain, discharge, redness, itching and visual disturbance.   Respiratory: Positive for cough. Negative for shortness of breath, wheezing and stridor.    Cardiovascular: Positive for leg swelling.   Musculoskeletal: Positive for arthralgias and joint swelling.   Skin: Negative for rash.   Allergic/Immunologic: Negative for environmental allergies.   Psychiatric/Behavioral: Positive for dysphoric mood and depressed mood. Negative for sleep disturbance.       Vitals:    02/13/20 1103   BP: 143/91   Pulse: 112   Resp: 18   Temp: 98.3 °F (36.8 °C)   SpO2: 94%       Objective   Physical Exam   Constitutional: She is oriented to person, place, and time. She appears well-developed and well-nourished. No distress.   HENT:   Head: Normocephalic and atraumatic.   Right Ear: External ear normal.   Left Ear: External ear normal.   Nose: Nose normal.   Mouth/Throat: Oropharynx is clear and moist. No oropharyngeal exudate.   Eyes: Pupils are equal, round, and reactive to light. Conjunctivae and EOM are normal. Right eye exhibits no discharge. Left eye exhibits no discharge. No scleral icterus.   Neck: Normal range of motion. Neck supple. No thyromegaly present.   Cardiovascular: Normal rate, regular rhythm, normal heart sounds and intact distal pulses.   No murmur  heard.  Pulmonary/Chest: Effort normal and breath sounds normal. No respiratory distress. She has no wheezes. She has no rales.   Musculoskeletal: She exhibits edema.        Right ankle: She exhibits swelling. She exhibits no ecchymosis.        Left ankle: She exhibits swelling. She exhibits no ecchymosis.        Right lower leg: She exhibits no tenderness.        Left lower leg: She exhibits no tenderness.   Lymphadenopathy:     She has no cervical adenopathy.   Neurological: She is alert and oriented to person, place, and time. No cranial nerve deficit.   Skin: Skin is warm and dry. No petechiae and no rash noted. She is not diaphoretic. No erythema. No pallor.   Psychiatric: She has a normal mood and affect. Her behavior is normal. Judgment and thought content normal.   Nursing note and vitals reviewed.        Assessment/Plan   Eva was seen today for joint swelling and nasal congestion.    Diagnoses and all orders for this visit:    Acute URI    Venous insufficiency of both lower extremities    Grief    Sacroiliac joint pain  -     HYDROcodone-acetaminophen (NORCO) 7.5-325 MG per tablet; Take 1 tablet by mouth Every 6 (Six) Hours As Needed for Severe Pain .    Other orders  -     omeprazole (priLOSEC) 20 MG capsule; Take 1 capsule by mouth Daily.      Disc'd tx of venous insuff w/ pt ------LE elevation/ compression hose  Pt to tx URI symptoms/ use HFA prn

## 2020-02-14 RX ORDER — ALBUTEROL SULFATE 90 UG/1
AEROSOL, METERED RESPIRATORY (INHALATION)
Qty: 18 G | Refills: 0 | Status: SHIPPED | OUTPATIENT
Start: 2020-02-14 | End: 2020-03-23

## 2020-03-23 RX ORDER — ALBUTEROL SULFATE 90 UG/1
AEROSOL, METERED RESPIRATORY (INHALATION)
Qty: 18 G | Refills: 0 | Status: SHIPPED | OUTPATIENT
Start: 2020-03-23 | End: 2020-04-21 | Stop reason: SDUPTHER

## 2020-03-23 NOTE — TELEPHONE ENCOUNTER
Last visit:  2/13/20  Next visit: 5/18/20  Last labs: 9/20/19    Rx requested: Albuterol   Pharmacy: [facility] in [place]

## 2020-04-21 ENCOUNTER — TELEPHONE (OUTPATIENT)
Dept: FAMILY MEDICINE CLINIC | Facility: CLINIC | Age: 70
End: 2020-04-21

## 2020-04-21 DIAGNOSIS — M53.3 SACROILIAC JOINT PAIN: ICD-10-CM

## 2020-04-21 RX ORDER — ALBUTEROL SULFATE 90 UG/1
2 AEROSOL, METERED RESPIRATORY (INHALATION) EVERY 6 HOURS PRN
Qty: 18 G | Refills: 0 | Status: SHIPPED | OUTPATIENT
Start: 2020-04-21 | End: 2020-05-29

## 2020-04-21 RX ORDER — HYDROCODONE BITARTRATE AND ACETAMINOPHEN 7.5; 325 MG/1; MG/1
1 TABLET ORAL EVERY 6 HOURS PRN
Qty: 120 TABLET | Refills: 0 | Status: SHIPPED | OUTPATIENT
Start: 2020-04-21 | End: 2020-06-05 | Stop reason: SDUPTHER

## 2020-05-26 RX ORDER — FEXOFENADINE HYDROCHLORIDE AND PSEUDOEPHEDRINE HYDROCHLORIDE 60; 120 MG/1; MG/1
TABLET, FILM COATED, EXTENDED RELEASE ORAL
Qty: 60 TABLET | Refills: 0 | Status: SHIPPED | OUTPATIENT
Start: 2020-05-26

## 2020-05-26 NOTE — TELEPHONE ENCOUNTER
Last visit:  2/13/20  Next visit: 7/20/20  Last labs: 9/20/19    Rx requested:Abril PAULINO   Pharmacy: [facility] in [place]

## 2020-05-29 RX ORDER — ALBUTEROL SULFATE 90 UG/1
AEROSOL, METERED RESPIRATORY (INHALATION)
Qty: 18 G | Refills: 0 | Status: SHIPPED | OUTPATIENT
Start: 2020-05-29 | End: 2020-07-06

## 2020-06-05 DIAGNOSIS — M53.3 SACROILIAC JOINT PAIN: ICD-10-CM

## 2020-06-05 RX ORDER — HYDROCODONE BITARTRATE AND ACETAMINOPHEN 7.5; 325 MG/1; MG/1
1 TABLET ORAL EVERY 6 HOURS PRN
Qty: 120 TABLET | Refills: 0 | Status: SHIPPED | OUTPATIENT
Start: 2020-06-05 | End: 2020-07-17 | Stop reason: SDUPTHER

## 2020-06-16 ENCOUNTER — OFFICE VISIT (OUTPATIENT)
Dept: FAMILY MEDICINE CLINIC | Facility: CLINIC | Age: 70
End: 2020-06-16

## 2020-06-16 VITALS
SYSTOLIC BLOOD PRESSURE: 123 MMHG | RESPIRATION RATE: 16 BRPM | TEMPERATURE: 97.7 F | WEIGHT: 126 LBS | HEIGHT: 63 IN | DIASTOLIC BLOOD PRESSURE: 83 MMHG | OXYGEN SATURATION: 100 % | HEART RATE: 95 BPM | BODY MASS INDEX: 22.32 KG/M2
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
## 2020-06-16 DIAGNOSIS — S80.861A TICK BITE OF RIGHT LOWER LEG, INITIAL ENCOUNTER: Primary | ICD-10-CM

## 2020-06-16 PROCEDURE — 99213 OFFICE O/P EST LOW 20 MIN: CPT | Performed by: FAMILY MEDICINE

## 2020-06-16 RX ORDER — DOXYCYCLINE 100 MG/1
200 TABLET ORAL ONCE
Qty: 2 TABLET | Refills: 0 | Status: SHIPPED | OUTPATIENT
Start: 2020-06-16 | End: 2020-06-16

## 2020-06-16 NOTE — PROGRESS NOTES
Subjective   Eva Borrego is a 70 y.o. female.     Chief Complaint   Patient presents with   • Insect Bite     Tick bite on her right thigh last night.          Current Outpatient Medications:   •  ALLEGRA-D ALLERGY & CONGESTION  MG per 12 hr tablet, TAKE ONE TABLET BY MOUTH TWICE A DAY AS NEEDED FOR ALLERGIES, Disp: 60 tablet, Rfl: 0  •  Cholecalciferol (VITAMIN D3) 125 MCG (5000 UT) capsule capsule, Take 5,000 Units by mouth. 1-2 tablets by mouth weekly, Disp: , Rfl:   •  denosumab (PROLIA) 60 MG/ML solution prefilled syringe syringe, Inject  under the skin into the appropriate area as directed. Inject SubQ every 6 months, Disp: , Rfl:   •  HYDROcodone-acetaminophen (Norco) 7.5-325 MG per tablet, Take 1 tablet by mouth Every 6 (Six) Hours As Needed for Severe Pain ., Disp: 120 tablet, Rfl: 0  •  levothyroxine (SYNTHROID, LEVOTHROID) 100 MCG tablet, Take 1 tablet by mouth Daily., Disp: 90 tablet, Rfl: 3  •  multivitamin-minerals (CENTRUM) tablet, Take 1 tablet by mouth Daily., Disp: , Rfl:   •  omeprazole (priLOSEC) 20 MG capsule, Take 1 capsule by mouth Daily., Disp: 90 capsule, Rfl: 1  •  promethazine-dextromethorphan (PROMETHAZINE-DM) 6.25-15 MG/5ML syrup, Take 5 mL by mouth 4 (Four) Times a Day As Needed for Cough., Disp: 120 mL, Rfl: 0  •  simvastatin (ZOCOR) 10 MG tablet, Take 1 tablet by mouth Every Night., Disp: 90 tablet, Rfl: 3  •  VENTOLIN  (90 Base) MCG/ACT inhaler, INHALE TWO PUFFS BY MOUTH EVERY 6 HOURS AS NEEDED FOR WHEEZING OR SHORTNESS OF AIR, Disp: 18 g, Rfl: 0  •  doxycycline (ADOXA) 100 MG tablet, Take 2 tablets by mouth 1 (One) Time for 1 dose., Disp: 2 tablet, Rfl: 0    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   • COPD (chronic obstructive pulmonary disease) (CMS/[facility]) 4/16/2014   • GERD (gastroesophageal reflux disease)    • Hypothyroidism 4/18/2012    Overview:  IMO clean-up   • MAMMO     NEG = 2018   • 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/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/2016   • Positive GURINDER (antinuclear antibody) 6/15/2016   • Pure hypercholesterolemia 2012   • Sacroiliac joint pain 2016   • Telangiectasia 6/15/2016   • VACCINES     PNA 23/ Flu= 2019   • Vertigo 2011       Past Surgical History:   Procedure Laterality Date   • CHOLECYSTECTOMY     • COLONOSCOPY      = TA, rech      • EYE SURGERY      B/L Cataract/ Lens Lt   • JOINT REPLACEMENT Right     THR---2017   • SPINE SURGERY      Discectomy Lumbar   • TONSILLECTOMY         Family History   Problem Relation Age of Onset   • Diabetes Mother    • Hyperlipidemia Father    • Aortic aneurysm Father    • Arthritis Maternal Grandmother    • Osteoporosis Maternal Grandmother    • Stroke Paternal Grandfather    • Alcohol abuse Sister    • Kidney disease Daughter        Social History     Socioeconomic History   • Marital status:      Spouse name: Not on file   • Number of children: Not on file   • Years of education: Not on file   • Highest education level: Not on file   Tobacco Use   • Smoking status: Former Smoker     Packs/day: 1.00     Years: 40.00     Pack years: 40.00     Types: Cigarettes     Last attempt to quit: 10/11/2012     Years since quittin.6   • Smokeless tobacco: Never Used   • Tobacco comment: Vapes 3mL-8   Substance and Sexual Activity   • Alcohol use: No     Frequency: Never   • Drug use: No   • Sexual activity: Defer       71y/o C female here for tick bite     Pt scratched her Rt thigh and found a tick last nite; pt last o/s on Sat when [place]; Pt states she brought the tick in for eval and her leg is red        The following portions of the patient's history were reviewed and updated as appropriate: allergies, current medications, past family history, past medical history, past social history, past surgical history and problem list.    Review of  Systems   Constitutional: Negative for activity change, appetite change, fever, unexpected weight gain and unexpected weight loss.   Skin: Positive for color change, rash and skin lesions. Negative for bruise.   Psychiatric/Behavioral: Negative for sleep disturbance and stress.       Vitals:    06/16/20 1033   BP: 123/83   Pulse: 95   Resp: 16   Temp: 97.7 °F (36.5 °C)   SpO2: 100%       Objective   Physical Exam   Constitutional: She is oriented to person, place, and time. She appears well-developed and well-nourished. No distress.   HENT:   Head: Normocephalic.   Neurological: She is alert and oriented to person, place, and time. No cranial nerve deficit.   Skin: Skin is warm, dry and intact. Rash noted. There is erythema.        Psychiatric: She has a normal mood and affect. Her behavior is normal. Judgment and thought content normal.   Nursing note and vitals reviewed.        Assessment/Plan   Eva was seen today for insect bite.    Diagnoses and all orders for this visit:    Tick bite of right lower leg, initial encounter    Other orders  -     doxycycline (ADOXA) 100 MG tablet; Take 2 tablets by mouth 1 (One) Time for 1 dose.

## 2020-07-06 RX ORDER — ALBUTEROL SULFATE 90 UG/1
AEROSOL, METERED RESPIRATORY (INHALATION)
Qty: 18 G | Refills: 0 | Status: SHIPPED | OUTPATIENT
Start: 2020-07-06 | End: 2020-08-05

## 2020-07-06 NOTE — TELEPHONE ENCOUNTER
Last visit: 6/16/20  Next visit:10/20/20  Last labs: 9/20/19    Rx requested: Ventolin HFA   Ph[facility]acy: [facility] [place]

## 2020-07-17 ENCOUNTER — TELEPHONE (OUTPATIENT)
Dept: FAMILY MEDICINE CLINIC | Facility: CLINIC | Age: 70
End: 2020-07-17

## 2020-07-17 DIAGNOSIS — M53.3 SACROILIAC JOINT PAIN: ICD-10-CM

## 2020-07-17 RX ORDER — HYDROCODONE BITARTRATE AND ACETAMINOPHEN 7.5; 325 MG/1; MG/1
1 TABLET ORAL EVERY 6 HOURS PRN
Qty: 120 TABLET | Refills: 0 | Status: SHIPPED | OUTPATIENT
Start: 2020-07-17 | End: 2020-08-31 | Stop reason: SDUPTHER

## 2020-08-05 RX ORDER — ALBUTEROL SULFATE 90 UG/1
AEROSOL, METERED RESPIRATORY (INHALATION)
Qty: 18 G | Refills: 0 | Status: SHIPPED | OUTPATIENT
Start: 2020-08-05 | End: 2021-01-21 | Stop reason: SDUPTHER

## 2020-08-05 NOTE — TELEPHONE ENCOUNTER
Last visit:  6/16/20  Next visit:10/20/20  Last labs:9/20/19    Rx requested:Ventolin   Pharmacy: [facility] [place]

## 2020-08-05 NOTE — TELEPHONE ENCOUNTER
If needing a new rescue HFA every month, she may want to consider a daily maintenance inhaler to see if she does better and only use the rescue one in emergencies

## 2020-08-31 DIAGNOSIS — M53.3 SACROILIAC JOINT PAIN: ICD-10-CM

## 2020-08-31 RX ORDER — HYDROCODONE BITARTRATE AND ACETAMINOPHEN 7.5; 325 MG/1; MG/1
1 TABLET ORAL EVERY 6 HOURS PRN
Qty: 120 TABLET | Refills: 0 | Status: SHIPPED | OUTPATIENT
Start: 2020-08-31 | End: 2020-10-12 | Stop reason: SDUPTHER

## 2020-09-14 ENCOUNTER — TELEPHONE (OUTPATIENT)
Dept: FAMILY MEDICINE CLINIC | Facility: CLINIC | Age: 70
End: 2020-09-14

## 2020-09-14 NOTE — TELEPHONE ENCOUNTER
Pt states she was bit by a tick and wants to know if you want her to take Doxycyline again.  [facility].  Last seen 6/20. Unable to come in due to quarantined

## 2020-09-22 ENCOUNTER — OFFICE VISIT (OUTPATIENT)
Dept: FAMILY MEDICINE CLINIC | Facility: CLINIC | Age: 70
End: 2020-09-22

## 2020-09-22 VITALS
HEIGHT: 63 IN | TEMPERATURE: 97.3 F | BODY MASS INDEX: 22.32 KG/M2 | DIASTOLIC BLOOD PRESSURE: 83 MMHG | WEIGHT: 126 LBS | SYSTOLIC BLOOD PRESSURE: 119 MMHG | HEART RATE: 101 BPM | RESPIRATION RATE: 16 BRPM | OXYGEN SATURATION: 93 %

## 2020-09-22 DIAGNOSIS — E03.9 ACQUIRED HYPOTHYROIDISM: ICD-10-CM

## 2020-09-22 DIAGNOSIS — M81.0 OSTEOPOROSIS WITHOUT CURRENT PATHOLOGICAL FRACTURE, UNSPECIFIED OSTEOPOROSIS TYPE: ICD-10-CM

## 2020-09-22 DIAGNOSIS — S90.861A INSECT BITE OF RIGHT FOOT, INITIAL ENCOUNTER: Primary | ICD-10-CM
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
## 2020-09-22 DIAGNOSIS — E78.2 MIXED HYPERLIPIDEMIA: ICD-10-CM

## 2020-09-22 PROCEDURE — 99213 OFFICE O/P EST LOW 20 MIN: CPT | Performed by: FAMILY MEDICINE

## 2020-09-22 RX ORDER — CEPHALEXIN 500 MG/1
500 CAPSULE ORAL 2 TIMES DAILY
Qty: 14 CAPSULE | Refills: 0 | Status: SHIPPED | OUTPATIENT
Start: 2020-09-22 | End: 2020-10-23

## 2020-09-22 RX ORDER — OMEPRAZOLE 20 MG/1
20 CAPSULE, DELAYED RELEASE ORAL DAILY
Qty: 90 CAPSULE | Refills: 1 | Status: SHIPPED | OUTPATIENT
Start: 2020-09-22 | End: 2021-01-21 | Stop reason: SDUPTHER

## 2020-09-22 NOTE — PROGRESS NOTES
Subjective   Eva Borrego is a 70 y.o. female.     Chief Complaint   Patient presents with   • Insect Bite     Patient states that she didn't see what the bug was but suspects that it was a spider. Patient got bit Wednesday last week.          Current Outpatient Medications:   •  ALLEGRA-D ALLERGY & CONGESTION  MG per 12 hr tablet, TAKE ONE TABLET BY MOUTH TWICE A DAY AS NEEDED FOR ALLERGIES, Disp: 60 tablet, Rfl: 0  •  Cholecalciferol (VITAMIN D3) 125 MCG (5000 UT) capsule capsule, Take 5,000 Units by mouth. 1-2 tablets by mouth weekly, Disp: , Rfl:   •  denosumab (PROLIA) 60 MG/ML solution prefilled syringe syringe, Inject  under the skin into the appropriate area as directed. Inject SubQ every 6 months, Disp: , Rfl:   •  fluticasone-salmeterol (Advair Diskus) 250-50 MCG/DOSE DISKUS, Inhale 1 puff 2 (Two) Times a Day., Disp: 60 each, Rfl: 2  •  HYDROcodone-acetaminophen (Norco) 7.5-325 MG per tablet, Take 1 tablet by mouth Every 6 (Six) Hours As Needed for Severe Pain ., Disp: 120 tablet, Rfl: 0  •  levothyroxine (SYNTHROID, LEVOTHROID) 100 MCG tablet, Take 1 tablet by mouth Daily., Disp: 90 tablet, Rfl: 3  •  multivitamin-minerals (CENTRUM) tablet, Take 1 tablet by mouth Daily., Disp: , Rfl:   •  omeprazole (priLOSEC) 20 MG capsule, Take 1 capsule by mouth Daily., Disp: 90 capsule, Rfl: 1  •  simvastatin (ZOCOR) 10 MG tablet, Take 1 tablet by mouth Every Night., Disp: 90 tablet, Rfl: 3  •  VENTOLIN  (90 Base) MCG/ACT inhaler, INHALE TWO PUFFS BY MOUTH EVERY 6 HOURS AS NEEDED FOR WHEEZING OR SHORTNESS OF AIR, Disp: 18 g, Rfl: 0  •  cephalexin (Keflex) 500 MG capsule, Take 1 capsule by mouth 2 (Two) Times a Day., Disp: 14 capsule, Rfl: 0    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   • COPD (chronic obstructive pulmonary disease) (CMS/[facility]) 4/16/2014   • GERD (gastroesophageal reflux disease)    • Hypothyroidism 4/18/2012    Overview:  IMO clean-up  "  • MAMMO     NEG = 2018   • 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 (CMS/HCC) 10/06/2016   • Positive GURINDER (antinuclear antibody) 6/15/2016   • Pure hypercholesterolemia 2012   • Sacroiliac joint pain 2016   • Telangiectasia 6/15/2016   • VACCINES     PNA 23/ Flu= 2019   • Vertigo 2011       Past Surgical History:   Procedure Laterality Date   • CHOLECYSTECTOMY     • COLONOSCOPY      2018= TA, rech      • EYE SURGERY      B/L Cataract/ Lens Lt   • JOINT REPLACEMENT Right     THR---2017   • SPINE SURGERY      Discectomy Lumbar   • TONSILLECTOMY         Family History   Problem Relation Age of Onset   • Diabetes Mother    • Hyperlipidemia Father    • Aortic aneurysm Father    • Arthritis Maternal Grandmother    • Osteoporosis Maternal Grandmother    • Stroke Paternal Grandfather    • Alcohol abuse Sister    • Kidney disease Daughter        Social History     Socioeconomic History   • Marital status:      Spouse name: Not on file   • Number of children: Not on file   • Years of education: Not on file   • Highest education level: Not on file   Tobacco Use   • Smoking status: Former Smoker     Packs/day: 1.00     Years: 40.00     Pack years: 40.00     Types: Cigarettes     Quit date: 10/11/2012     Years since quittin.9   • Smokeless tobacco: Never Used   • Tobacco comment: Vapes 3mL-8   Substance and Sexual Activity   • Alcohol use: No     Frequency: Never   • Drug use: No   • Sexual activity: Defer       71y/o C female here w/ 5 day old \"bite\" on Rt foot    Pt states 5 days ago at 3am she felt a burning itch on her Rt foot----she got out of bed and went into her bathroom to look at her foot--->>>>it looked like a blood blister and she put neosporin w/ pain relief on it; it helped a lot w/ the itching/ burning and she also took a 25mg Benadryl x 1 and went back to bed    Pt states it is some better since then and using the oint off/on when it bothers " her w/ good relief; her son wanted her to have it looked at -----it seems less red and smaller today       The following portions of the patient's history were reviewed and updated as appropriate: allergies, current medications, past family history, past medical history, past social history, past surgical history and problem list.    Review of Systems   Constitutional: Negative for activity change, appetite change, fever, unexpected weight gain and unexpected weight loss.   Musculoskeletal: Negative for gait problem.   Skin: Positive for color change and skin lesions. Negative for rash and bruise.   Psychiatric/Behavioral: Negative for sleep disturbance.       Vitals:    09/22/20 1113   BP: 119/83   Pulse: 101   Resp: 16   Temp: 97.3 °F (36.3 °C)   SpO2: 93%       Objective   Physical Exam  Vitals signs and nursing note reviewed.   Constitutional:       General: She is not in acute distress.     Appearance: Normal appearance. She is normal weight. She is not ill-appearing or toxic-appearing.   Musculoskeletal:        Feet:    Skin:     General: Skin is warm and dry.      Findings: Lesion present.   Neurological:      General: No focal deficit present.      Mental Status: She is alert and oriented to person, place, and time. Mental status is at baseline.      Cranial Nerves: No cranial nerve deficit.   Psychiatric:         Mood and Affect: Mood normal.         Behavior: Behavior normal.         Thought Content: Thought content normal.         Judgment: Judgment normal.           Assessment/Plan   Eva was seen today for insect bite.    Diagnoses and all orders for this visit:    Insect bite of right foot, initial encounter    Acquired hypothyroidism  -     T4, Free; Future  -     TSH; Future    Mixed hyperlipidemia  -     Lipid Panel; Future  -     Comprehensive Metabolic Panel; Future    Osteoporosis without current pathological fracture, unspecified osteoporosis type  -     Vitamin D 25 Hydroxy; Future    Other  orders  -     cephalexin (Keflex) 500 MG capsule; Take 1 capsule by mouth 2 (Two) Times a Day.  -     omeprazole (priLOSEC) 20 MG capsule; Take 1 capsule by mouth Daily.      Keep area clean and dry; call if worsens; Trial of Keflex x 1 week

## 2020-09-29 ENCOUNTER — CLINICAL SUPPORT (OUTPATIENT)
Dept: FAMILY MEDICINE CLINIC | Facility: CLINIC | Age: 70
End: 2020-09-29

## 2020-09-29 DIAGNOSIS — M81.0 OSTEOPOROSIS WITHOUT CURRENT PATHOLOGICAL FRACTURE, UNSPECIFIED OSTEOPOROSIS TYPE: ICD-10-CM

## 2020-09-29 DIAGNOSIS — E03.9 ACQUIRED HYPOTHYROIDISM: ICD-10-CM

## 2020-09-29 DIAGNOSIS — E78.2 MIXED HYPERLIPIDEMIA: ICD-10-CM

## 2020-09-29 PROCEDURE — 80061 LIPID PANEL: CPT | Performed by: FAMILY MEDICINE

## 2020-09-29 PROCEDURE — 82306 VITAMIN D 25 HYDROXY: CPT | Performed by: FAMILY MEDICINE

## 2020-09-29 PROCEDURE — 84443 ASSAY THYROID STIM HORMONE: CPT | Performed by: FAMILY MEDICINE

## 2020-09-29 PROCEDURE — 84439 ASSAY OF FREE THYROXINE: CPT | Performed by: FAMILY MEDICINE

## 2020-09-29 PROCEDURE — 36415 COLL VENOUS BLD VENIPUNCTURE: CPT | Performed by: FAMILY MEDICINE

## 2020-09-29 PROCEDURE — 80053 COMPREHEN METABOLIC PANEL: CPT | Performed by: FAMILY MEDICINE

## 2020-09-30 LAB
25(OH)D3 SERPL-MCNC: 50.2 NG/ML (ref 30–100)
ALBUMIN SERPL-MCNC: 4.2 G/DL (ref 3.5–5.2)
ALBUMIN/GLOB SERPL: 1.4 G/DL
ALP SERPL-CCNC: 62 U/L (ref 39–117)
ALT SERPL W P-5'-P-CCNC: 14 U/L (ref 1–33)
ANION GAP SERPL CALCULATED.3IONS-SCNC: 13.2 MMOL/L (ref 5–15)
AST SERPL-CCNC: 15 U/L (ref 1–32)
BILIRUB SERPL-MCNC: 0.3 MG/DL (ref 0–1.2)
BUN SERPL-MCNC: 13 MG/DL (ref 8–23)
BUN/CREAT SERPL: 22.8 (ref 7–25)
CALCIUM SPEC-SCNC: 9.8 MG/DL (ref 8.6–10.5)
CHLORIDE SERPL-SCNC: 99 MMOL/L (ref 98–107)
CHOLEST SERPL-MCNC: 159 MG/DL (ref 0–200)
CO2 SERPL-SCNC: 28.8 MMOL/L (ref 22–29)
CREAT SERPL-MCNC: 0.57 MG/DL (ref 0.57–1)
GFR SERPL CREATININE-BSD FRML MDRD: 105 ML/MIN/1.73
GLOBULIN UR ELPH-MCNC: 3.1 GM/DL
GLUCOSE SERPL-MCNC: 105 MG/DL (ref 65–99)
HDLC SERPL-MCNC: 53 MG/DL (ref 40–60)
LDLC SERPL CALC-MCNC: 88 MG/DL (ref 0–100)
LDLC/HDLC SERPL: 1.66 {RATIO}
POTASSIUM SERPL-SCNC: 4.3 MMOL/L (ref 3.5–5.2)
PROT SERPL-MCNC: 7.3 G/DL (ref 6–8.5)
SODIUM SERPL-SCNC: 141 MMOL/L (ref 136–145)
T4 FREE SERPL-MCNC: 1.67 NG/DL (ref 0.93–1.7)
TRIGL SERPL-MCNC: 91 MG/DL (ref 0–150)
TSH SERPL DL<=0.05 MIU/L-ACNC: 0.71 UIU/ML (ref 0.27–4.2)
VLDLC SERPL-MCNC: 18.2 MG/DL (ref 5–40)

## 2020-10-01 ENCOUNTER — TELEPHONE (OUTPATIENT)
Dept: FAMILY MEDICINE CLINIC | Facility: CLINIC | Age: 70
End: 2020-10-01

## 2020-10-01 NOTE — TELEPHONE ENCOUNTER
Patient called and scheduled her flu shot for 10/7 but wants to know if you also want her to get the pneumonia shot?

## 2020-10-07 ENCOUNTER — CLINICAL SUPPORT (OUTPATIENT)
Dept: FAMILY MEDICINE CLINIC | Facility: CLINIC | Age: 70
End: 2020-10-07

## 2020-10-07 DIAGNOSIS — Z23 NEED FOR INFLUENZA VACCINATION: ICD-10-CM

## 2020-10-07 PROCEDURE — G0008 ADMIN INFLUENZA VIRUS VAC: HCPCS | Performed by: FAMILY MEDICINE

## 2020-10-07 PROCEDURE — 90694 VACC AIIV4 NO PRSRV 0.5ML IM: CPT | Performed by: FAMILY MEDICINE

## 2020-10-12 DIAGNOSIS — M53.3 SACROILIAC JOINT PAIN: ICD-10-CM

## 2020-10-12 RX ORDER — HYDROCODONE BITARTRATE AND ACETAMINOPHEN 7.5; 325 MG/1; MG/1
1 TABLET ORAL EVERY 6 HOURS PRN
Qty: 120 TABLET | Refills: 0 | Status: SHIPPED | OUTPATIENT
Start: 2020-10-12 | End: 2020-11-17 | Stop reason: SDUPTHER

## 2020-10-23 ENCOUNTER — OFFICE VISIT (OUTPATIENT)
Dept: FAMILY MEDICINE CLINIC | Facility: CLINIC | Age: 70
End: 2020-10-23

## 2020-10-23 VITALS
TEMPERATURE: 97.5 F | HEIGHT: 63 IN | DIASTOLIC BLOOD PRESSURE: 82 MMHG | WEIGHT: 129 LBS | BODY MASS INDEX: 22.86 KG/M2 | SYSTOLIC BLOOD PRESSURE: 116 MMHG | HEART RATE: 99 BPM | RESPIRATION RATE: 14 BRPM | OXYGEN SATURATION: 97 %

## 2020-10-23 DIAGNOSIS — J43.1 PANLOBULAR EMPHYSEMA (HCC): ICD-10-CM

## 2020-10-23 DIAGNOSIS — M81.0 OSTEOPOROSIS WITHOUT CURRENT PATHOLOGICAL FRACTURE, UNSPECIFIED OSTEOPOROSIS TYPE: ICD-10-CM

## 2020-10-23 DIAGNOSIS — Z00.00 MEDICARE ANNUAL WELLNESS VISIT, SUBSEQUENT: Primary | ICD-10-CM

## 2020-10-23 DIAGNOSIS — M15.9 PRIMARY OSTEOARTHRITIS INVOLVING MULTIPLE JOINTS: ICD-10-CM

## 2020-10-23 DIAGNOSIS — Z12.31 ENCOUNTER FOR SCREENING MAMMOGRAM FOR BREAST CANCER: ICD-10-CM

## 2020-10-23 DIAGNOSIS — Z51.81 ENCOUNTER FOR THERAPEUTIC DRUG LEVEL MONITORING: ICD-10-CM

## 2020-10-23 PROBLEM — R60.9 EDEMA: Status: RESOLVED | Noted: 2018-08-06 | Resolved: 2020-10-23

## 2020-10-23 PROCEDURE — G0439 PPPS, SUBSEQ VISIT: HCPCS | Performed by: FAMILY MEDICINE

## 2020-10-23 PROCEDURE — 80305 DRUG TEST PRSMV DIR OPT OBS: CPT | Performed by: FAMILY MEDICINE

## 2020-10-23 PROCEDURE — 99213 OFFICE O/P EST LOW 20 MIN: CPT | Performed by: FAMILY MEDICINE

## 2020-10-23 RX ORDER — LEVOTHYROXINE SODIUM 0.1 MG/1
100 TABLET ORAL DAILY
Qty: 90 TABLET | Refills: 3 | Status: SHIPPED | OUTPATIENT
Start: 2020-10-23 | End: 2021-10-23 | Stop reason: SDUPTHER

## 2020-10-23 RX ORDER — SIMVASTATIN 10 MG
10 TABLET ORAL NIGHTLY
Qty: 90 TABLET | Refills: 3 | Status: SHIPPED | OUTPATIENT
Start: 2020-10-23 | End: 2021-10-23 | Stop reason: SDUPTHER

## 2020-10-23 NOTE — PROGRESS NOTES
The ABCs of the Annual Wellness Visit  Subsequent Medicare Wellness Visit    Chief Complaint   Patient presents with   • Medicare Wellness-subsequent   • COPD   • Pain       Subjective   History of Present Illness:  Eva Borrego is a 70 y.o. female who presents for a Subsequent Medicare Wellness Visit.  Pt states she is being more active and states when she first started the advair, it was awesome..... then as time passed, she is still using her rescue HFA at least once a day..... Pt states she is usu taking the pain meds TID but when the rain comes in, she has severe OA pain....    HEALTH RISK ASSESSMENT    Recent Hospitalizations:  No hospitalization(s) within the last year.    Current Medical Providers:  Patient Care Team:  Shannan Larsen DO as PCP - General (Family Medicine)    Smoking Status:  Social History     Tobacco Use   Smoking Status Former Smoker   • Packs/day: 1.00   • Years: 40.00   • Pack years: 40.00   • Types: Cigarettes   • Quit date: 10/11/2012   • Years since quittin.0   Smokeless Tobacco Never Used   Tobacco Comment    Vapes 3mL-8       Alcohol Consumption:  Social History     Substance and Sexual Activity   Alcohol Use No   • Frequency: Never       Depression Screen:   PHQ-2/PHQ-9 Depression Screening 10/23/2020   Little interest or pleasure in doing things 0   Feeling down, depressed, or hopeless 0   Trouble falling or staying asleep, or sleeping too much 0   Feeling tired or having little energy 1   Poor appetite or overeating 0   Feeling bad about yourself - or that you are a failure or have let yourself or your family down 0   Trouble concentrating on things, such as reading the newspaper or watching television 0   Moving or speaking so slowly that other people could have noticed. Or the opposite - being so fidgety or restless that you have been moving around a lot more than usual 0   Thoughts that you would be better off dead, or of hurting yourself in some way 0   Total Score  1   If you checked off any problems, how difficult have these problems made it for you to do your work, take care of things at home, or get along with other people? Not difficult at all       Fall Risk Screen:  JOSE ALFREDO Fall Risk Assessment was completed, and patient is at HIGH risk for falls. Assessment completed on:10/23/2020    Health Habits and Functional and Cognitive Screening:  Functional & Cognitive Status 10/23/2020   Do you have difficulty preparing food and eating? No   Do you have difficulty bathing yourself, getting dressed or grooming yourself? No   Do you have difficulty using the toilet? No   Do you have difficulty moving around from place to place? No   Do you have trouble with steps or getting out of a bed or a chair? No   Current Diet Well Balanced Diet   Dental Exam Up to date   Eye Exam Not up to date   Exercise (times per week) 3 times per week   Current Exercise Activities Include Walking   Do you need help using the phone?  No   Are you deaf or do you have serious difficulty hearing?  No   Do you need help with transportation? No   Do you need help shopping? No   Do you need help preparing meals?  No   Do you need help with housework?  No   Do you need help with laundry? No   Do you need help taking your medications? No   Do you need help managing money? No   Do you ever drive or ride in a car without wearing a seat belt? No   Have you felt unusual stress, anger or loneliness in the last month? No   Who do you live with? Child   If you need help, do you have trouble finding someone available to you? No   Have you been bothered in the last four weeks by sexual problems? No   Do you have difficulty concentrating, remembering or making decisions? Yes         Does the patient have evidence of cognitive impairment? No    Asprin use counseling:Does not need ASA (and currently is not on it)    Age-appropriate Screening Schedule:  Refer to the list below for future screening recommendations based on  patient's age, sex and/or medical conditions. Orders for these recommended tests are listed in the plan section. The patient has been provided with a written plan.    Health Maintenance   Topic Date Due   • TDAP/TD VACCINES (1 - Tdap) 05/11/1969   • ZOSTER VACCINE (1 of 2) 05/11/2000   • DXA SCAN  09/20/2019   • LIPID PANEL  09/29/2021   • MAMMOGRAM  11/14/2021   • COLONOSCOPY  01/03/2029   • INFLUENZA VACCINE  Completed          The following portions of the patient's history were reviewed and updated as appropriate: allergies, current medications, past family history, past medical history, past social history, past surgical history and problem list.    Outpatient Medications Prior to Visit   Medication Sig Dispense Refill   • ALLEGRA-D ALLERGY & CONGESTION  MG per 12 hr tablet TAKE ONE TABLET BY MOUTH TWICE A DAY AS NEEDED FOR ALLERGIES (Patient taking differently: Take 1 tablet by mouth Daily.) 60 tablet 0   • denosumab (PROLIA) 60 MG/ML solution prefilled syringe syringe Inject  under the skin into the appropriate area as directed. Inject SubQ every 6 months     • HYDROcodone-acetaminophen (Norco) 7.5-325 MG per tablet Take 1 tablet by mouth Every 6 (Six) Hours As Needed for Severe Pain . 120 tablet 0   • multivitamin-minerals (CENTRUM) tablet Take 1 tablet by mouth Daily.     • omeprazole (priLOSEC) 20 MG capsule Take 1 capsule by mouth Daily. 90 capsule 1   • VENTOLIN  (90 Base) MCG/ACT inhaler INHALE TWO PUFFS BY MOUTH EVERY 6 HOURS AS NEEDED FOR WHEEZING OR SHORTNESS OF AIR 18 g 0   • cephalexin (Keflex) 500 MG capsule Take 1 capsule by mouth 2 (Two) Times a Day. 14 capsule 0   • Cholecalciferol (VITAMIN D3) 125 MCG (5000 UT) capsule capsule Take 5,000 Units by mouth. Take 1 tablet on Monday,Wednesday and Friday.     • fluticasone-salmeterol (Advair Diskus) 250-50 MCG/DOSE DISKUS Inhale 1 puff 2 (Two) Times a Day. 60 each 2   • levothyroxine (SYNTHROID, LEVOTHROID) 100 MCG tablet Take 1 tablet  "by mouth Daily. 90 tablet 3   • simvastatin (ZOCOR) 10 MG tablet Take 1 tablet by mouth Every Night. 90 tablet 3     No facility-administered medications prior to visit.        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 Patient has an advance directive in EMR which is still valid.  Patient has an advance directive (not in EMR), copy requested.    Review of Systems   Constitutional: Positive for activity change and appetite change. Negative for fatigue and unexpected weight change.   Respiratory: Positive for chest tightness and shortness of breath.        Compared to one year ago, the patient feels her physical health is the same.  Compared to one year ago, the patient feels her mental health is better.    Reviewed chart for potential of high risk medication in the elderly: yes  Reviewed chart for potential of harmful drug interactions in the elderly:yes    Objective         Vitals:    10/23/20 1050   BP: 116/82   BP Location: Right arm   Patient Position: Sitting   Cuff Size: Adult   Pulse: 99   Resp: 14   Temp: 97.5 °F (36.4 °C)   TempSrc: Temporal   SpO2: 97%   Weight: 58.5 kg (129 lb)   Height: 160 cm (63\")   PainSc:   5   PainLoc: Generalized       Body mass index is 22.85 kg/m².  Discussed the patient's BMI with her. The BMI is below average; BMI management plan is completed.    Physical Exam  Vitals signs and nursing note reviewed.   Constitutional:       General: She is not in acute distress.     Appearance: Normal appearance. " She is not ill-appearing or toxic-appearing.   Pulmonary:      Effort: Pulmonary effort is normal.      Breath sounds: Decreased air movement present. Examination of the right-upper field reveals decreased breath sounds. Examination of the left-upper field reveals decreased breath sounds. Examination of the right-middle field reveals decreased breath sounds. Examination of the left-middle field reveals decreased breath sounds. Examination of the right-lower field reveals decreased breath sounds. Examination of the left-lower field reveals decreased breath sounds. Decreased breath sounds present.   Skin:     General: Skin is warm and dry.   Neurological:      General: No focal deficit present.      Mental Status: She is alert and oriented to person, place, and time.      Cranial Nerves: No cranial nerve deficit.   Psychiatric:         Attention and Perception: Attention and perception normal.         Mood and Affect: Mood and affect normal.         Speech: Speech normal.         Behavior: Behavior normal. Behavior is cooperative.         Thought Content: Thought content normal.         Cognition and Memory: Cognition and memory normal.         Judgment: Judgment normal.               Lab Results   Component Value Date    TRIG 91 09/29/2020    HDL 53 09/29/2020    LDL 88 09/29/2020    VLDL 18.2 09/29/2020        Assessment/Plan   Medicare Risks and Personalized Health Plan  CMS Preventative Services Quick Reference  Breast Cancer/Mammogram Screening  Colon Cancer Screening  Dementia/Memory   Depression/Dysphoria  Fall Risk  Immunizations Discussed/Encouraged (specific immunizations; Influenza, Pneumococcal 23, Prevnar and Shingrix )  Inadequate Social Support, Isolation, Loneliness, Lack of Transportation, Financial Difficulties, or Caregiver Stress   Inactivity/Sedentary  Lung Cancer Risk  Osteoprorosis Risk    The above risks/problems have been discussed with the patient.  Pertinent information has been shared with  the patient in the After Visit Summary.  Follow up plans and orders are seen below in the Assessment/Plan Section.    Diagnoses and all orders for this visit:    1. Medicare annual wellness visit, subsequent (Primary)    2. Panlobular emphysema (CMS/HCC)    3. Osteoporosis without current pathological fracture, unspecified osteoporosis type  -     DEXA Bone Density Axial; Future    4. Primary osteoarthritis involving multiple joints    5. Encounter for screening mammogram for breast cancer  -     Mammo Screening Digital Tomosynthesis Bilateral With CAD; Future    6. Encounter for therapeutic drug level monitoring   -     POC Urine Drug Screen, Triage    Other orders  -     fluticasone-salmeterol (Advair Diskus) 500-50 MCG/DOSE DISKUS; Inhale 1 puff 2 (Two) Times a Day.  Dispense: 180 each; Refill: 1  -     levothyroxine (SYNTHROID, LEVOTHROID) 100 MCG tablet; Take 1 tablet by mouth Daily.  Dispense: 90 tablet; Refill: 3  -     simvastatin (ZOCOR) 10 MG tablet; Take 1 tablet by mouth Every Night.  Dispense: 90 tablet; Refill: 3  -     Cholecalciferol (vitamin D3) 125 MCG (5000 UT) capsule capsule; Take 1 tablet on Monday,Wednesday and Friday.  Dispense: 30 capsule; Refill: 0    Mammo/ Dexa this yr  RTC 6mos  Follow Up:  No follow-ups on file.     An After Visit Summary and PPPS were given to the patient.

## 2020-11-16 ENCOUNTER — HOSPITAL ENCOUNTER (OUTPATIENT)
Dept: MAMMOGRAPHY | Facility: HOSPITAL | Age: 70
Discharge: HOME | End: 2020-11-16

## 2020-11-16 ENCOUNTER — APPOINTMENT (OUTPATIENT)
Dept: MAMMOGRAPHY | Facility: HOSPITAL | Age: 70
End: 2020-11-16

## 2020-11-16 ENCOUNTER — HOSPITAL ENCOUNTER (OUTPATIENT)
Dept: BONE DENSITY | Facility: HOSPITAL | Age: 70
Discharge: HOME | End: 2020-11-16

## 2020-11-16 DIAGNOSIS — M81.0 OSTEOPOROSIS WITHOUT CURRENT PATHOLOGICAL FRACTURE, UNSPECIFIED OSTEOPOROSIS TYPE: ICD-10-CM

## 2020-11-16 DIAGNOSIS — Z12.31 ENCOUNTER FOR SCREENING MAMMOGRAM FOR BREAST CANCER: ICD-10-CM

## 2020-11-16 PROCEDURE — 77067 SCR MAMMO BI INCL CAD: CPT

## 2020-11-16 PROCEDURE — 77063 BREAST TOMOSYNTHESIS BI: CPT

## 2020-11-16 PROCEDURE — 77080 DXA BONE DENSITY AXIAL: CPT

## 2020-11-17 ENCOUNTER — HOSPITAL ENCOUNTER (OUTPATIENT)
Dept: PET IMAGING | Facility: HOSPITAL | Age: 70
Discharge: HOME | End: 2020-11-17
Admitting: FAMILY MEDICINE

## 2020-11-17 DIAGNOSIS — R91.8 MULTIPLE LUNG NODULES ON CT: ICD-10-CM

## 2020-11-17 DIAGNOSIS — F17.211 NICOTINE DEPENDENCE, CIGARETTES, IN REMISSION: ICD-10-CM

## 2020-11-17 DIAGNOSIS — Z12.2 ENCOUNTER FOR SCREENING FOR LUNG CANCER: ICD-10-CM

## 2020-11-17 DIAGNOSIS — M53.3 SACROILIAC JOINT PAIN: ICD-10-CM

## 2020-11-17 PROCEDURE — G0297 LDCT FOR LUNG CA SCREEN: HCPCS

## 2020-11-17 RX ORDER — HYDROCODONE BITARTRATE AND ACETAMINOPHEN 7.5; 325 MG/1; MG/1
1 TABLET ORAL EVERY 6 HOURS PRN
Qty: 120 TABLET | Refills: 0 | Status: SHIPPED | OUTPATIENT
Start: 2020-11-17 | End: 2021-01-04 | Stop reason: SDUPTHER

## 2020-11-18 ENCOUNTER — TELEPHONE (OUTPATIENT)
Dept: FAMILY MEDICINE CLINIC | Facility: CLINIC | Age: 70
End: 2020-11-18

## 2020-11-18 NOTE — TELEPHONE ENCOUNTER
----- Message from Shannan Larsen DO sent at 11/18/2020 10:36 AM EST -----  Stable size in Aortic aneurism---rech 1 yr  Moderate COPD changes of the lungs  Lung nodules need rech in 1 yr

## 2020-11-23 ENCOUNTER — TELEPHONE (OUTPATIENT)
Dept: FAMILY MEDICINE CLINIC | Facility: CLINIC | Age: 70
End: 2020-11-23

## 2020-11-23 NOTE — TELEPHONE ENCOUNTER
[facility] in [place].    I called Medical Records and they asked to send fax request on letter head for Dexa 2016(?) results.    P# [phone]  F# [phone]

## 2020-11-23 NOTE — TELEPHONE ENCOUNTER
----- Message from Shannan Larsen DO sent at 11/18/2020  7:31 PM EST -----  DEXA shows healthy spine but hips are osteoporotic--------where did she get her last DEXA?

## 2021-01-04 DIAGNOSIS — M53.3 SACROILIAC JOINT PAIN: ICD-10-CM

## 2021-01-04 RX ORDER — HYDROCODONE BITARTRATE AND ACETAMINOPHEN 7.5; 325 MG/1; MG/1
1 TABLET ORAL EVERY 6 HOURS PRN
Qty: 120 TABLET | Refills: 0 | Status: SHIPPED | OUTPATIENT
Start: 2021-01-04 | End: 2021-02-16 | Stop reason: SDUPTHER

## 2021-01-21 ENCOUNTER — OFFICE VISIT (OUTPATIENT)
Dept: FAMILY MEDICINE CLINIC | Facility: CLINIC | Age: 71
End: 2021-01-21

## 2021-01-21 VITALS
HEIGHT: 63 IN | BODY MASS INDEX: 22.15 KG/M2 | WEIGHT: 125 LBS | HEART RATE: 104 BPM | OXYGEN SATURATION: 97 % | DIASTOLIC BLOOD PRESSURE: 77 MMHG | TEMPERATURE: 97.5 F | RESPIRATION RATE: 18 BRPM | SYSTOLIC BLOOD PRESSURE: 120 MMHG

## 2021-01-21 DIAGNOSIS — M15.9 PRIMARY OSTEOARTHRITIS INVOLVING MULTIPLE JOINTS: ICD-10-CM

## 2021-01-21 DIAGNOSIS — J43.1 PANLOBULAR EMPHYSEMA (HCC): ICD-10-CM

## 2021-01-21 DIAGNOSIS — M53.3 SACROILIAC JOINT PAIN: Primary | ICD-10-CM

## 2021-01-21 DIAGNOSIS — I50.42 CHRONIC COMBINED SYSTOLIC (CONGESTIVE) AND DIASTOLIC (CONGESTIVE) HEART FAILURE (HCC): ICD-10-CM

## 2021-01-21 PROCEDURE — 99213 OFFICE O/P EST LOW 20 MIN: CPT | Performed by: FAMILY MEDICINE

## 2021-01-21 RX ORDER — BLOOD-GLUCOSE METER
1 KIT MISCELLANEOUS 4 TIMES DAILY PRN
Qty: 1 EACH | Refills: 0 | Status: SHIPPED | OUTPATIENT
Start: 2021-01-21 | End: 2021-07-22

## 2021-01-21 RX ORDER — OMEPRAZOLE 20 MG/1
20 CAPSULE, DELAYED RELEASE ORAL DAILY
Qty: 90 CAPSULE | Refills: 1 | Status: SHIPPED | OUTPATIENT
Start: 2021-01-21 | End: 2021-07-22 | Stop reason: SDUPTHER

## 2021-01-21 RX ORDER — ALBUTEROL SULFATE 90 UG/1
2 AEROSOL, METERED RESPIRATORY (INHALATION) EVERY 6 HOURS PRN
Qty: 18 G | Refills: 2 | Status: SHIPPED | OUTPATIENT
Start: 2021-01-21

## 2021-02-03 RX ORDER — IPRATROPIUM BROMIDE AND ALBUTEROL SULFATE 2.5; .5 MG/3ML; MG/3ML
3 SOLUTION RESPIRATORY (INHALATION) EVERY 4 HOURS PRN
Qty: 360 ML | Refills: 0 | Status: SHIPPED | OUTPATIENT
Start: 2021-02-03 | End: 2021-07-22 | Stop reason: SDUPTHER

## 2021-02-16 DIAGNOSIS — M53.3 SACROILIAC JOINT PAIN: ICD-10-CM

## 2021-02-16 RX ORDER — HYDROCODONE BITARTRATE AND ACETAMINOPHEN 7.5; 325 MG/1; MG/1
1 TABLET ORAL EVERY 6 HOURS PRN
Qty: 120 TABLET | Refills: 0 | Status: SHIPPED | OUTPATIENT
Start: 2021-02-16 | End: 2021-03-29 | Stop reason: SDUPTHER

## 2021-02-16 NOTE — TELEPHONE ENCOUNTER
Caller: Eva Borrego    Relationship: Self    Best call back number:[phone]     Medication needed:   Requested Prescriptions     Pending Prescriptions Disp Refills   • HYDROcodone-acetaminophen (Norco) 7.5-325 MG per tablet 120 tablet 0     Sig: Take 1 tablet by mouth Every 6 (Six) Hours As Needed for Severe Pain .       When do you need the refill by: ASAP    Does the patient have less than a 3 day supply:  [x] Yes  [] No    What is the patient's preferred pharmacy: [facility] [address] [place] [address] [address] [facility] 162 - [phone] [facility] [phone]

## 2021-02-27 PROBLEM — I50.42 CHRONIC COMBINED SYSTOLIC (CONGESTIVE) AND DIASTOLIC (CONGESTIVE) HEART FAILURE: Status: ACTIVE | Noted: 2021-02-27

## 2021-03-02 ENCOUNTER — TELEPHONE (OUTPATIENT)
Dept: FAMILY MEDICINE CLINIC | Facility: CLINIC | Age: 71
End: 2021-03-02

## 2021-03-05 RX ORDER — BUDESONIDE AND FORMOTEROL FUMARATE DIHYDRATE 160; 4.5 UG/1; UG/1
2 AEROSOL RESPIRATORY (INHALATION)
Qty: 10.2 G | Refills: 3 | Status: SHIPPED | OUTPATIENT
Start: 2021-03-05 | End: 2021-07-02

## 2021-03-05 NOTE — TELEPHONE ENCOUNTER
Patient states that she has tried and failed the Advair so she can try either the Symbicort or Breo.   I told her I would have  pick one and we would send it in.

## 2021-03-05 NOTE — TELEPHONE ENCOUNTER
PA for Breztri was denied.    Health Plan’s Preferred Products  SYMBICORT -4.5   ADVAIR DISKU /50   BREO ELLIPTA -25

## 2021-03-29 DIAGNOSIS — M53.3 SACROILIAC JOINT PAIN: ICD-10-CM

## 2021-03-29 RX ORDER — HYDROCODONE BITARTRATE AND ACETAMINOPHEN 7.5; 325 MG/1; MG/1
1 TABLET ORAL EVERY 6 HOURS PRN
Qty: 120 TABLET | Refills: 0 | Status: SHIPPED | OUTPATIENT
Start: 2021-03-29 | End: 2021-05-10 | Stop reason: SDUPTHER

## 2021-03-29 NOTE — TELEPHONE ENCOUNTER
Last visit:  1/21/21  Next visit:7/22/21  Last labs:9/29/20    Rx requested: Hydrocodone  [facility] [place]

## 2021-05-10 DIAGNOSIS — M53.3 SACROILIAC JOINT PAIN: ICD-10-CM

## 2021-05-10 RX ORDER — HYDROCODONE BITARTRATE AND ACETAMINOPHEN 7.5; 325 MG/1; MG/1
1 TABLET ORAL EVERY 6 HOURS PRN
Qty: 120 TABLET | Refills: 0 | Status: SHIPPED | OUTPATIENT
Start: 2021-05-10 | End: 2021-06-21 | Stop reason: SDUPTHER

## 2021-05-10 NOTE — TELEPHONE ENCOUNTER
Caller: Eva Borrego    Relationship: Self    Medication needed:   Requested Prescriptions     Pending Prescriptions Disp Refills   • HYDROcodone-acetaminophen (Norco) 7.5-325 MG per tablet 120 tablet 0     Sig: Take 1 tablet by mouth Every 6 (Six) Hours As Needed for Severe Pain .           Does the patient have less than a 3 day supply:  [x] Yes  [] No    What is the patient's preferred pharmacy: [facility] [address] [place] [address] 403 AT [facility] [address] & [place] [address] - [phone] [facility] [phone] FX

## 2021-06-21 ENCOUNTER — TELEPHONE (OUTPATIENT)
Dept: FAMILY MEDICINE CLINIC | Facility: CLINIC | Age: 71
End: 2021-06-21

## 2021-06-21 DIAGNOSIS — M53.3 SACROILIAC JOINT PAIN: ICD-10-CM

## 2021-06-21 RX ORDER — HYDROCODONE BITARTRATE AND ACETAMINOPHEN 7.5; 325 MG/1; MG/1
1 TABLET ORAL EVERY 6 HOURS PRN
Qty: 120 TABLET | Refills: 0 | Status: SHIPPED | OUTPATIENT
Start: 2021-06-21 | End: 2021-07-30 | Stop reason: SDUPTHER

## 2021-06-21 RX ORDER — HYDROCODONE BITARTRATE AND ACETAMINOPHEN 7.5; 325 MG/1; MG/1
1 TABLET ORAL EVERY 6 HOURS PRN
Qty: 120 TABLET | Refills: 0 | Status: CANCELLED | OUTPATIENT
Start: 2021-06-21

## 2021-06-21 NOTE — TELEPHONE ENCOUNTER
Caller: Eva Borrego    Relationship: Self    Best call back number: [phone]    Medication needed:   Requested Prescriptions     Pending Prescriptions Disp Refills   • HYDROcodone-acetaminophen (Norco) 7.5-325 MG per tablet 120 tablet 0     Sig: Take 1 tablet by mouth Every 6 (Six) Hours As Needed for Severe Pain .       When do you need the refill by:ASAP     What additional details did the patient provide when requesting the medication:     Does the patient have less than a 3 day supply:  [x] Yes  [] No    What is the patient's preferred pharmacy: [facility] [address] [place] [address] [address] [facility] 162 - [phone] [facility] [phone] FX

## 2021-06-21 NOTE — TELEPHONE ENCOUNTER
Last visit:  1/21/21  Next visit: 7/22/21  Last labs: 9/29/20    Rx requested:Hydrocodone   Pharmacy: [facility] [place]

## 2021-07-02 RX ORDER — BUDESONIDE AND FORMOTEROL FUMARATE DIHYDRATE 160; 4.5 UG/1; UG/1
AEROSOL RESPIRATORY (INHALATION)
Qty: 10.2 G | Refills: 2 | Status: SHIPPED | OUTPATIENT
Start: 2021-07-02 | End: 2021-10-04

## 2021-07-02 NOTE — TELEPHONE ENCOUNTER
Last visit:  1/21/21  Next visit:7/22/21  Last labs: 9/29/20  Last Lipid:9/29/20    Rx requested: Symbicort   [facility]armacy: [facility] [place]

## 2021-07-22 ENCOUNTER — OFFICE VISIT (OUTPATIENT)
Dept: FAMILY MEDICINE CLINIC | Facility: CLINIC | Age: 71
End: 2021-07-22

## 2021-07-22 VITALS
RESPIRATION RATE: 16 BRPM | SYSTOLIC BLOOD PRESSURE: 120 MMHG | HEIGHT: 63 IN | HEART RATE: 94 BPM | BODY MASS INDEX: 21.79 KG/M2 | TEMPERATURE: 97.1 F | DIASTOLIC BLOOD PRESSURE: 82 MMHG | OXYGEN SATURATION: 99 % | WEIGHT: 123 LBS

## 2021-07-22 DIAGNOSIS — E03.9 ACQUIRED HYPOTHYROIDISM: Primary | ICD-10-CM

## 2021-07-22 DIAGNOSIS — Z23 IMMUNIZATION DUE: ICD-10-CM

## 2021-07-22 DIAGNOSIS — E78.2 MIXED HYPERLIPIDEMIA: ICD-10-CM

## 2021-07-22 DIAGNOSIS — M53.3 SACROILIAC JOINT PAIN: ICD-10-CM

## 2021-07-22 DIAGNOSIS — Z12.11 SCREENING FOR COLON CANCER: ICD-10-CM

## 2021-07-22 PROCEDURE — 99214 OFFICE O/P EST MOD 30 MIN: CPT | Performed by: FAMILY MEDICINE

## 2021-07-22 PROCEDURE — G0009 ADMIN PNEUMOCOCCAL VACCINE: HCPCS | Performed by: FAMILY MEDICINE

## 2021-07-22 PROCEDURE — 90670 PCV13 VACCINE IM: CPT | Performed by: FAMILY MEDICINE

## 2021-07-22 RX ORDER — OMEPRAZOLE 20 MG/1
20 CAPSULE, DELAYED RELEASE ORAL DAILY
Qty: 90 CAPSULE | Refills: 1 | Status: SHIPPED | OUTPATIENT
Start: 2021-07-22 | End: 2022-01-03

## 2021-07-22 RX ORDER — IPRATROPIUM BROMIDE AND ALBUTEROL SULFATE 2.5; .5 MG/3ML; MG/3ML
3 SOLUTION RESPIRATORY (INHALATION) EVERY 4 HOURS PRN
Qty: 360 ML | Refills: 0 | Status: SHIPPED | OUTPATIENT
Start: 2021-07-22 | End: 2021-10-04

## 2021-07-29 ENCOUNTER — TELEPHONE (OUTPATIENT)
Dept: ONCOLOGY | Facility: CLINIC | Age: 71
End: 2021-07-29

## 2021-07-30 DIAGNOSIS — M53.3 SACROILIAC JOINT PAIN: ICD-10-CM

## 2021-07-30 NOTE — TELEPHONE ENCOUNTER
Last visit:  7/22/21  Next visit: none  Last labs & Lipids: 9/29/20 (scheduled for labs on 10/22/21)    Rx requested: Hydrocodone   [facility]acy:[facility] [place]

## 2021-07-30 NOTE — TELEPHONE ENCOUNTER
Caller: Eva Borrego    Relationship: Self    Best call back number: [phone]    Medication needed:   Requested Prescriptions     Pending Prescriptions Disp Refills   • HYDROcodone-acetaminophen (Norco) 7.5-325 MG per tablet 120 tablet 0     Sig: Take 1 tablet by mouth Every 6 (Six) Hours As Needed for Severe Pain .       When do you need the refill by: MONDAY      Does the patient have less than a 3 day supply:  [x] Yes  [] No    What is the patient's preferred pharmacy: [facility] [address] [place] [address] [address] [facility] 162 - [phone] [facility] [phone] FX

## 2021-07-31 RX ORDER — HYDROCODONE BITARTRATE AND ACETAMINOPHEN 7.5; 325 MG/1; MG/1
1 TABLET ORAL EVERY 6 HOURS PRN
Qty: 120 TABLET | Refills: 0 | Status: SHIPPED | OUTPATIENT
Start: 2021-07-31 | End: 2021-09-13 | Stop reason: SDUPTHER

## 2021-08-04 PROBLEM — M81.0 SENILE OSTEOPOROSIS: Status: ACTIVE | Noted: 2021-08-04

## 2021-08-05 ENCOUNTER — TELEPHONE (OUTPATIENT)
Dept: FAMILY MEDICINE CLINIC | Facility: CLINIC | Age: 71
End: 2021-08-05

## 2021-08-05 NOTE — TELEPHONE ENCOUNTER
Laila Yang called about the Prolia shot order stating that she cannot see any information in the patients office visit note or chart that she has tried calcium or any other medications for osteo in her chart other than the vitamin d.       Laila Yang [phone]

## 2021-08-09 NOTE — TELEPHONE ENCOUNTER
I called the patient and she said she had the prolia shot last at the [facility] in [place]. Patient states that she hasn't done any oral medication for osteoporosis prevention. Patient states that they did schedule her Prolia shot at the cancer center for 8/10/21 at 3:00PM     I called to get the results from the [facility] in [place] and they said we have to send the request. There fax is [phone].     Request was faxed to the [facility] to receive prior prolia info.

## 2021-08-10 ENCOUNTER — HOSPITAL ENCOUNTER (OUTPATIENT)
Dept: ONCOLOGY | Facility: HOSPITAL | Age: 71
Setting detail: INFUSION SERIES
Discharge: HOME | End: 2021-08-10

## 2021-08-10 NOTE — TELEPHONE ENCOUNTER
I called and refaxed the request to the [facility] to get her last prolia shot info and we still haven't received it. Patient will call the cancer center to reschedule the Prolia shot appt and I willl keep trying to get the records from them.

## 2021-08-11 NOTE — TELEPHONE ENCOUNTER
I called the [facility] twice today with no answer. I have been trying to fax the request for the information on the last prolia shot and it keeps coming back as busy or no answer. I have even tried faxing it to the pharmacy and that is also coming back.

## 2021-08-13 ENCOUNTER — TELEPHONE (OUTPATIENT)
Dept: FAMILY MEDICINE CLINIC | Facility: CLINIC | Age: 71
End: 2021-08-13

## 2021-08-13 NOTE — TELEPHONE ENCOUNTER
Caller: Eva Borrego    Relationship to patient: Self    Best call back number: [phone]    Patient is needing: PATIENT HAD APPT SCHEDULED 8-10-21 FOR PROLIA, BUT WAS CANCELLED. PATIENT NOT SURE WHY APPT WAS CANCELLED, SOMETHING ABOUT NOT GETTING PAPERWORK FROM [facility].  RESCHEDULED FOR 8-17-21. PATIENT ASKING IF THE ISSUE HAS BEEN SOLVED AND IF THE 8-17-21 APPT IS CONFIRMED?    PLEASE ADVISE

## 2021-08-13 NOTE — TELEPHONE ENCOUNTER
Caller: Eva Borrego     Relationship to patient: Self     Best call back number: [phone]     Patient is needing: PATIENT HAD APPT SCHEDULED 8-10-21 FOR PROLIA, BUT WAS CANCELLED. PATIENT NOT SURE WHY APPT WAS CANCELLED, SOMETHING ABOUT NOT GETTING PAPERWORK FROM [facility].  RESCHEDULED FOR 8-17-21. PATIENT ASKING IF THE ISSUE HAS BEEN SOLVED AND IF THE 8-17-21 APPT IS CONFIRMED?     PLEASE ADVISE

## 2021-08-13 NOTE — TELEPHONE ENCOUNTER
HUB to read.  I have been faxing the [facility] since 8/9/21 to try to get her last prolia shot date with no success on receiving the results.     I called her insurance company to see if they can give me a date on the last prolia injection and was given 3 different medicare numbers to call and no one could help me.    I called the [facility] in [place] back today and demanded that someone help me with this information now and was told that there fax number as well as the pharmacys fax has been down.    HER LAST PROLIA SHOT DATES PER CHRISTOPHER AT THE [facility] WERE:    10/26/20  2/28/20      Patient was notified that she can get her shot on 8/17/21.

## 2021-08-17 ENCOUNTER — HOSPITAL ENCOUNTER (OUTPATIENT)
Dept: ONCOLOGY | Facility: HOSPITAL | Age: 71
Setting detail: INFUSION SERIES
Discharge: HOME | End: 2021-08-17

## 2021-08-17 VITALS
BODY MASS INDEX: 21.79 KG/M2 | HEIGHT: 63 IN | DIASTOLIC BLOOD PRESSURE: 67 MMHG | HEART RATE: 65 BPM | SYSTOLIC BLOOD PRESSURE: 133 MMHG | TEMPERATURE: 97.3 F | RESPIRATION RATE: 18 BRPM

## 2021-08-17 DIAGNOSIS — E78.2 MIXED HYPERLIPIDEMIA: ICD-10-CM

## 2021-08-17 DIAGNOSIS — M81.0 SENILE OSTEOPOROSIS: Primary | ICD-10-CM

## 2021-08-17 LAB
ALBUMIN SERPL-MCNC: 4.2 G/DL (ref 3.5–5.2)
ALBUMIN/GLOB SERPL: 1.4 G/DL
ALP SERPL-CCNC: 87 U/L (ref 39–117)
ALT SERPL W P-5'-P-CCNC: 13 U/L (ref 1–33)
ANION GAP SERPL CALCULATED.3IONS-SCNC: 10 MMOL/L (ref 5–15)
AST SERPL-CCNC: 17 U/L (ref 1–32)
BILIRUB SERPL-MCNC: 0.2 MG/DL (ref 0–1.2)
BUN SERPL-MCNC: 11 MG/DL (ref 8–23)
BUN/CREAT SERPL: 20.4 (ref 7–25)
CALCIUM SPEC-SCNC: 9.5 MG/DL (ref 8.6–10.5)
CHLORIDE SERPL-SCNC: 102 MMOL/L (ref 98–107)
CO2 SERPL-SCNC: 30 MMOL/L (ref 22–29)
CREAT SERPL-MCNC: 0.54 MG/DL (ref 0.57–1)
GFR SERPL CREATININE-BSD FRML MDRD: 111 ML/MIN/1.73
GLOBULIN UR ELPH-MCNC: 3 GM/DL
GLUCOSE SERPL-MCNC: 86 MG/DL (ref 65–99)
POTASSIUM SERPL-SCNC: 4.1 MMOL/L (ref 3.5–5.2)
PROT SERPL-MCNC: 7.2 G/DL (ref 6–8.5)
SODIUM SERPL-SCNC: 142 MMOL/L (ref 136–145)

## 2021-08-17 PROCEDURE — 25010000002 DENOSUMAB 60 MG/ML SOLUTION PREFILLED SYRINGE: Performed by: FAMILY MEDICINE

## 2021-08-17 PROCEDURE — 80053 COMPREHEN METABOLIC PANEL: CPT | Performed by: FAMILY MEDICINE

## 2021-08-17 PROCEDURE — 96372 THER/PROPH/DIAG INJ SC/IM: CPT

## 2021-08-17 RX ADMIN — DENOSUMAB 60 MG: 60 INJECTION SUBCUTANEOUS at 15:48

## 2021-08-17 NOTE — TELEPHONE ENCOUNTER
I received a call from the cancer center stating that patient hasn't had her calcium checked in almost a year and they need that before they can give her the prolia shot. I told her that we have lab orders in her chart already that has a CMP and she can use that if she wants. She said she would.

## 2021-08-17 NOTE — PROGRESS NOTES
Spoke with pharmacy whom advised CMP needed to be drawn today in order to give Prolia today.      Prolia given, CMP drawn, Dr Larsen notified of shot given

## 2021-09-13 DIAGNOSIS — M53.3 SACROILIAC JOINT PAIN: ICD-10-CM

## 2021-09-13 RX ORDER — HYDROCODONE BITARTRATE AND ACETAMINOPHEN 7.5; 325 MG/1; MG/1
1 TABLET ORAL EVERY 6 HOURS PRN
Qty: 120 TABLET | Refills: 0 | Status: SHIPPED | OUTPATIENT
Start: 2021-09-13 | End: 2021-10-25 | Stop reason: SDUPTHER

## 2021-09-13 NOTE — TELEPHONE ENCOUNTER
Caller: Eva Borreog    Relationship: Self    Best call back number:     Medication needed:   Requested Prescriptions     Pending Prescriptions Disp Refills   • HYDROcodone-acetaminophen (Norco) 7.5-325 MG per tablet 120 tablet 0     Sig: Take 1 tablet by mouth Every 6 (Six) Hours As Needed for Severe Pain .           Does the patient have less than a 3 day supply:  [x] Yes  [] No    What is the patient's preferred pharmacy: [facility] [address] [place] [address] 403 AT [facility] 3 & [place] [address] - [phone] [place] [phone] FX

## 2021-09-13 NOTE — TELEPHONE ENCOUNTER
Rx Refill Note  Requested Prescriptions     Pending Prescriptions Disp Refills   • HYDROcodone-acetaminophen (Norco) 7.5-325 MG per tablet 120 tablet 0     Sig: Take 1 tablet by mouth Every 6 (Six) Hours As Needed for Severe Pain .      Last office visit with prescribing clinician: 7/22/2021      Next office visit with prescribing clinician: Visit date not found     Comprehensive Metabolic Panel (08/17/2021 15:24)         Charisma Villegas, RT  09/13/21, 11:13 EDT

## 2021-10-04 ENCOUNTER — TELEPHONE (OUTPATIENT)
Dept: FAMILY MEDICINE CLINIC | Facility: CLINIC | Age: 71
End: 2021-10-04

## 2021-10-04 RX ORDER — IPRATROPIUM BROMIDE AND ALBUTEROL SULFATE 2.5; .5 MG/3ML; MG/3ML
SOLUTION RESPIRATORY (INHALATION)
Qty: 360 ML | Refills: 0 | Status: SHIPPED | OUTPATIENT
Start: 2021-10-04 | End: 2022-03-04

## 2021-10-04 RX ORDER — BUDESONIDE AND FORMOTEROL FUMARATE DIHYDRATE 160; 4.5 UG/1; UG/1
AEROSOL RESPIRATORY (INHALATION)
Qty: 10.2 G | Refills: 2 | Status: SHIPPED | OUTPATIENT
Start: 2021-10-04 | End: 2022-01-03

## 2021-10-04 NOTE — TELEPHONE ENCOUNTER
Caller: Eva Borrego    Relationship: Self      Medication requested (name and dosage): budesonide-formoterol (SYMBICORT) 160-4.5 MCG/ACT inhaler  Pharmacy where request should be sent:[facility] [address] [place] [address] [address] [place] [address] [address]  - [phone]  - [phone] FX      Additional details provided by patient: ON;LY HAS 1 DOSE FOR LEATHA     Best call back number:[phone]  Does the patient have less than a 3 day supply:  [x] Yes  [] No    Bryce Daniel Rep   10/04/21 14:05 EDT

## 2021-10-04 NOTE — TELEPHONE ENCOUNTER
Rx Refill Note  Requested Prescriptions     Pending Prescriptions Disp Refills   • ipratropium-albuterol (DUO-NEB) 0.5-2.5 mg/3 ml nebulizer [Pharmacy Med Name: IPRAT-ALBUT 0.5-3 MG/3 ML (30 VLS)]       Sig: INHALE ONE VIAL VIA NEBULIZER BY MOUTH EVERY 4 HOURS AS NEEDED FOR WHEEZING   • budesonide-formoterol (SYMBICORT) 160-4.5 MCG/ACT inhaler [Pharmacy Med Name: BUDESONIDE-FORMOTER 160-4.5 MCG INH] 10.2 g 2     Sig: INHALE TWO PUFFS BY MOUTH TWICE A DAY      Last office visit with prescribing clinician: 7/22/2021      Next office visit with prescribing clinician: Visit date not found     Comprehensive Metabolic Panel (08/17/2021 15:24)         Charisma Villegas, RT  10/04/21, 11:59 EDT

## 2021-10-12 ENCOUNTER — ON CAMPUS - OUTPATIENT (OUTPATIENT)
Dept: URBAN - METROPOLITAN AREA HOSPITAL 2 | Facility: HOSPITAL | Age: 71
End: 2021-10-12
Payer: MEDICARE

## 2021-10-12 ENCOUNTER — OFFICE (OUTPATIENT)
Dept: URBAN - METROPOLITAN AREA PATHOLOGY 4 | Facility: PATHOLOGY | Age: 71
End: 2021-10-12
Payer: COMMERCIAL

## 2021-10-12 VITALS
OXYGEN SATURATION: 100 % | OXYGEN SATURATION: 93 % | HEART RATE: 102 BPM | WEIGHT: 123 LBS | OXYGEN SATURATION: 95 % | DIASTOLIC BLOOD PRESSURE: 62 MMHG | HEART RATE: 105 BPM | HEART RATE: 98 BPM | HEIGHT: 63 IN | SYSTOLIC BLOOD PRESSURE: 126 MMHG | DIASTOLIC BLOOD PRESSURE: 69 MMHG | DIASTOLIC BLOOD PRESSURE: 79 MMHG | HEART RATE: 96 BPM | HEART RATE: 109 BPM | DIASTOLIC BLOOD PRESSURE: 68 MMHG | HEART RATE: 106 BPM | SYSTOLIC BLOOD PRESSURE: 102 MMHG | OXYGEN SATURATION: 97 % | OXYGEN SATURATION: 96 % | SYSTOLIC BLOOD PRESSURE: 119 MMHG | RESPIRATION RATE: 16 BRPM | TEMPERATURE: 97.8 F | HEART RATE: 101 BPM | DIASTOLIC BLOOD PRESSURE: 67 MMHG | DIASTOLIC BLOOD PRESSURE: 71 MMHG | HEART RATE: 124 BPM | SYSTOLIC BLOOD PRESSURE: 123 MMHG | DIASTOLIC BLOOD PRESSURE: 82 MMHG | SYSTOLIC BLOOD PRESSURE: 103 MMHG | DIASTOLIC BLOOD PRESSURE: 91 MMHG | SYSTOLIC BLOOD PRESSURE: 131 MMHG | RESPIRATION RATE: 18 BRPM | SYSTOLIC BLOOD PRESSURE: 105 MMHG | SYSTOLIC BLOOD PRESSURE: 109 MMHG | SYSTOLIC BLOOD PRESSURE: 99 MMHG | OXYGEN SATURATION: 99 % | DIASTOLIC BLOOD PRESSURE: 58 MMHG

## 2021-10-12 DIAGNOSIS — D12.3 BENIGN NEOPLASM OF TRANSVERSE COLON: ICD-10-CM

## 2021-10-12 DIAGNOSIS — Z08 ENCOUNTER FOR FOLLOW-UP EXAMINATION AFTER COMPLETED TREATMEN: ICD-10-CM

## 2021-10-12 DIAGNOSIS — K62.1 RECTAL POLYP: ICD-10-CM

## 2021-10-12 DIAGNOSIS — K57.30 DIVERTICULOSIS OF LARGE INTESTINE WITHOUT PERFORATION OR ABS: ICD-10-CM

## 2021-10-12 DIAGNOSIS — D12.8 BENIGN NEOPLASM OF RECTUM: ICD-10-CM

## 2021-10-12 DIAGNOSIS — Z86.010 PERSONAL HISTORY OF COLONIC POLYPS: ICD-10-CM

## 2021-10-12 PROBLEM — K63.5 POLYP OF COLON: Status: ACTIVE | Noted: 2021-10-12

## 2021-10-12 LAB
GI HISTOLOGY: A. UNSPECIFIED: (no result)
GI HISTOLOGY: B. UNSPECIFIED: (no result)
GI HISTOLOGY: PDF REPORT: (no result)

## 2021-10-12 PROCEDURE — 45385 COLONOSCOPY W/LESION REMOVAL: CPT | Mod: PT | Performed by: INTERNAL MEDICINE

## 2021-10-12 PROCEDURE — 88305 TISSUE EXAM BY PATHOLOGIST: CPT | Mod: 26 | Performed by: INTERNAL MEDICINE

## 2021-10-22 ENCOUNTER — CLINICAL SUPPORT (OUTPATIENT)
Dept: FAMILY MEDICINE CLINIC | Facility: CLINIC | Age: 71
End: 2021-10-22

## 2021-10-22 DIAGNOSIS — E03.9 ACQUIRED HYPOTHYROIDISM: ICD-10-CM

## 2021-10-22 DIAGNOSIS — E78.2 MIXED HYPERLIPIDEMIA: ICD-10-CM

## 2021-10-22 PROCEDURE — 80061 LIPID PANEL: CPT | Performed by: FAMILY MEDICINE

## 2021-10-22 PROCEDURE — 84439 ASSAY OF FREE THYROXINE: CPT | Performed by: FAMILY MEDICINE

## 2021-10-22 PROCEDURE — 36415 COLL VENOUS BLD VENIPUNCTURE: CPT | Performed by: FAMILY MEDICINE

## 2021-10-22 PROCEDURE — 84443 ASSAY THYROID STIM HORMONE: CPT | Performed by: FAMILY MEDICINE

## 2021-10-23 LAB
CHOLEST SERPL-MCNC: 167 MG/DL (ref 0–200)
HDLC SERPL-MCNC: 55 MG/DL (ref 40–60)
LDLC SERPL CALC-MCNC: 95 MG/DL (ref 0–100)
LDLC/HDLC SERPL: 1.71 {RATIO}
T4 FREE SERPL-MCNC: 1.73 NG/DL (ref 0.93–1.7)
TRIGL SERPL-MCNC: 90 MG/DL (ref 0–150)
TSH SERPL DL<=0.05 MIU/L-ACNC: 0.92 UIU/ML (ref 0.27–4.2)
VLDLC SERPL-MCNC: 17 MG/DL (ref 5–40)

## 2021-10-23 RX ORDER — LEVOTHYROXINE SODIUM 0.1 MG/1
100 TABLET ORAL DAILY
Qty: 90 TABLET | Refills: 3 | Status: SHIPPED | OUTPATIENT
Start: 2021-10-23 | End: 2022-08-12 | Stop reason: SDUPTHER

## 2021-10-23 RX ORDER — SIMVASTATIN 10 MG
10 TABLET ORAL NIGHTLY
Qty: 90 TABLET | Refills: 3 | Status: SHIPPED | OUTPATIENT
Start: 2021-10-23 | End: 2022-08-12 | Stop reason: SDUPTHER

## 2021-10-25 DIAGNOSIS — M53.3 SACROILIAC JOINT PAIN: ICD-10-CM

## 2021-10-25 RX ORDER — HYDROCODONE BITARTRATE AND ACETAMINOPHEN 7.5; 325 MG/1; MG/1
1 TABLET ORAL EVERY 6 HOURS PRN
Qty: 120 TABLET | Refills: 0 | Status: SHIPPED | OUTPATIENT
Start: 2021-10-25 | End: 2021-12-07 | Stop reason: SDUPTHER

## 2021-10-25 NOTE — TELEPHONE ENCOUNTER
Caller: Eva Borrego    Relationship: Self      Requested Prescriptions     Pending Prescriptions Disp Refills   • HYDROcodone-acetaminophen (Norco) 7.5-325 MG per tablet 120 tablet 0     Sig: Take 1 tablet by mouth Every 6 (Six) Hours As Needed for Severe Pain .        Pharmacy where request should be sent: [facility] [place] [address] - [address] [place] [address] [address]  - [phone]  - [phone]   [phone]    Additional details provided by patient: PATIENT WAS SEEN IN THE OFFICE ON 10/22/21 AND HAD BLOOD DRAWN AT THAT TIME. WOULD LIKE TO DISCUSS THE RESULTS IF THEY ARE IN.    Best call back number: [phone] (H)    Does the patient have less than a 3 day supply:  [x] Yes  [] No    Bryce Barbosa Rep   10/25/21 15:02 EDT

## 2021-10-25 NOTE — TELEPHONE ENCOUNTER
Rx Refill Note  Requested Prescriptions     Pending Prescriptions Disp Refills   • HYDROcodone-acetaminophen (Norco) 7.5-325 MG per tablet 120 tablet 0     Sig: Take 1 tablet by mouth Every 6 (Six) Hours As Needed for Severe Pain .      Last office visit with prescribing clinician: 7/22/2021      Next office visit with prescribing clinician: Visit date not found     T4, Free (10/22/2021 08:19)  Lipid Panel (10/22/2021 08:19)  TSH (10/22/2021 08:19)  Comprehensive Metabolic Panel (08/17/2021 15:24)         Radha Obrien CMA  10/25/21, 15:43 EDT

## 2021-10-26 ENCOUNTER — TELEPHONE (OUTPATIENT)
Dept: FAMILY MEDICINE CLINIC | Facility: CLINIC | Age: 71
End: 2021-10-26

## 2021-10-26 NOTE — TELEPHONE ENCOUNTER
Spoke with patient again.  UDS is unannounced.  Patient also was transferred to the appointment desk for a flu shot

## 2021-10-26 NOTE — TELEPHONE ENCOUNTER
Caller: Eva Borrego    Relationship: Self    Best call back number: [phone]     What is the best time to reach you: ANYTIME    Who are you requesting to speak with  MA OR DOCTOR        What was the call regarding: PATIENT IS CALLING SHE IS WANTING TO KNOW WHEN HER NEXT URINE SAMPLE IS DUE.    Do you require a callback: YES

## 2021-12-07 DIAGNOSIS — M53.3 SACROILIAC JOINT PAIN: ICD-10-CM

## 2021-12-07 RX ORDER — HYDROCODONE BITARTRATE AND ACETAMINOPHEN 7.5; 325 MG/1; MG/1
1 TABLET ORAL EVERY 6 HOURS PRN
Qty: 120 TABLET | Refills: 0 | Status: SHIPPED | OUTPATIENT
Start: 2021-12-07 | End: 2022-01-18 | Stop reason: SDUPTHER

## 2021-12-07 NOTE — TELEPHONE ENCOUNTER
Caller: Borrego Eva    Relationship: Self    Best call back number: [phone] (H)    Requested Prescriptions:   Requested Prescriptions     Pending Prescriptions Disp Refills   • HYDROcodone-acetaminophen (Norco) 7.5-325 MG per tablet 120 tablet 0     Sig: Take 1 tablet by mouth Every 6 (Six) Hours As Needed for Severe Pain .        Pharmacy where request should be sent: [facility] [address] [place] [address] 403 AT [facility] [address] & [place] [address] - [phone] [facility] [phone] FX     Additional details provided by patient: OUT OF MEDS    Does the patient have less than a 3 day supply:  [x] Yes  [] No    Bryce Hollins Rep   12/07/21 09:24 EST

## 2021-12-07 NOTE — TELEPHONE ENCOUNTER
Rx Refill Note  Requested Prescriptions     Pending Prescriptions Disp Refills   • HYDROcodone-acetaminophen (Norco) 7.5-325 MG per tablet 120 tablet 0     Sig: Take 1 tablet by mouth Every 6 (Six) Hours As Needed for Severe Pain .      Last office visit with prescribing clinician: 7/22/2021      Next office visit with prescribing clinician: Visit date not found     Lipid Panel (10/22/2021 08:19)         Charisma Villegas, RT  12/07/21, 10:08 EST

## 2022-01-03 ENCOUNTER — TELEPHONE (OUTPATIENT)
Dept: FAMILY MEDICINE CLINIC | Facility: CLINIC | Age: 72
End: 2022-01-03

## 2022-01-03 RX ORDER — OMEPRAZOLE 20 MG/1
CAPSULE, DELAYED RELEASE ORAL
Qty: 90 CAPSULE | Refills: 1 | Status: SHIPPED | OUTPATIENT
Start: 2022-01-03 | End: 2022-08-12 | Stop reason: SDUPTHER

## 2022-01-03 RX ORDER — BUDESONIDE AND FORMOTEROL FUMARATE DIHYDRATE 160; 4.5 UG/1; UG/1
AEROSOL RESPIRATORY (INHALATION)
Qty: 10.2 G | Refills: 2 | Status: SHIPPED | OUTPATIENT
Start: 2022-01-03 | End: 2022-04-04

## 2022-01-03 NOTE — TELEPHONE ENCOUNTER
HUB to jazmyne WOOD approved for Budesonide-Formterol Fumarate 160-4.5mcg/ACT   PA Approval was faxed to [facility] in [place].      BUDESONIDE-FORMOTEROL FUMARATE Aerosol  Type of coverage approved: Non-Formulary  This approval authorizes your coverage from 01/01/2022 - 01/03/2023, unless we notify you   otherwise, and as long as the following conditions apply:  • you remain enrolled in our Medicare Part D prescription drug plan,  • your physician or other prescriber continues to prescribe the medication for you, and  • the medication continues to be safe for treating your condition.  Depending upon the strength and/or formulation of the drug prescribed by your physician,   different quantity limits or safety edits may apply in the future

## 2022-01-03 NOTE — TELEPHONE ENCOUNTER
HUB to read    PA was sent for Budesonide-Formterol Fumarate 160-4.5mcg/ACT Aerosol (Generic Symbicort)    Waiting on the outcome from insurance.        Preferred Alternatives:  Symbicort  160-4.5  Breo Ellipta 100-25  ADVAIR DISKU /50  Advair HFA   Anoro Ellipta    Breztri Aerosphere   Pulmicort Flexhaler   Pulmicort Flexhaler    Flovent HFA  Trelegy Ellipta

## 2022-01-17 DIAGNOSIS — M53.3 SACROILIAC JOINT PAIN: ICD-10-CM

## 2022-01-17 RX ORDER — HYDROCODONE BITARTRATE AND ACETAMINOPHEN 7.5; 325 MG/1; MG/1
1 TABLET ORAL EVERY 6 HOURS PRN
Qty: 120 TABLET | Refills: 0 | OUTPATIENT
Start: 2022-01-17

## 2022-01-17 NOTE — TELEPHONE ENCOUNTER
Rx Refill Note  Requested Prescriptions     Pending Prescriptions Disp Refills   • HYDROcodone-acetaminophen (Norco) 7.5-325 MG per tablet 120 tablet 0     Sig: Take 1 tablet by mouth Every 6 (Six) Hours As Needed for Severe Pain .      Last office visit with prescribing clinician: 7/22/2021      Next office visit with prescribing clinician: Visit date not found     Lipid Panel (10/22/2021 08:19)  Comprehensive Metabolic Panel (08/17/2021 15:24)         Radha Obrien CMA  01/17/22, 11:40 EST     INSPECT scanned in the patients chart

## 2022-01-17 NOTE — TELEPHONE ENCOUNTER
Caller: Eva Borrego    Relationship: Self    Best call back number: [phone]    Requested Prescriptions:   Requested Prescriptions     Pending Prescriptions Disp Refills   • HYDROcodone-acetaminophen (Norco) 7.5-325 MG per tablet 120 tablet 0     Sig: Take 1 tablet by mouth Every 6 (Six) Hours As Needed for Severe Pain .        Pharmacy where request should be sent: [facility] [address] [place] [address] [address] [place] [address] - [phone] [facility] [phone] FX     Additional details provided by patient: PATIENT STATES SHE HAS 3 TABLETS REMAINING    Does the patient have less than a 3 day supply:  [x] Yes  [] No    Bryce Magallanes Rep   01/17/22 10:43 EST

## 2022-01-18 ENCOUNTER — TELEPHONE (OUTPATIENT)
Dept: FAMILY MEDICINE CLINIC | Facility: CLINIC | Age: 72
End: 2022-01-18

## 2022-01-18 DIAGNOSIS — M53.3 SACROILIAC JOINT PAIN: ICD-10-CM

## 2022-01-18 RX ORDER — HYDROCODONE BITARTRATE AND ACETAMINOPHEN 7.5; 325 MG/1; MG/1
1 TABLET ORAL EVERY 6 HOURS PRN
Qty: 120 TABLET | Refills: 0 | Status: SHIPPED | OUTPATIENT
Start: 2022-01-18 | End: 2022-02-18 | Stop reason: SDUPTHER

## 2022-01-18 NOTE — TELEPHONE ENCOUNTER
Hub to read    PA approved for Hydrocodone 7.5/325mg  PA approval was faxed to [facility] in [place].

## 2022-02-10 ENCOUNTER — OFFICE VISIT (OUTPATIENT)
Dept: FAMILY MEDICINE CLINIC | Facility: CLINIC | Age: 72
End: 2022-02-10

## 2022-02-10 VITALS
HEIGHT: 63 IN | RESPIRATION RATE: 14 BRPM | BODY MASS INDEX: 21.97 KG/M2 | WEIGHT: 124 LBS | SYSTOLIC BLOOD PRESSURE: 115 MMHG | TEMPERATURE: 98.7 F | DIASTOLIC BLOOD PRESSURE: 79 MMHG | HEART RATE: 109 BPM | OXYGEN SATURATION: 98 %

## 2022-02-10 DIAGNOSIS — E03.9 ACQUIRED HYPOTHYROIDISM: ICD-10-CM

## 2022-02-10 DIAGNOSIS — M81.0 SENILE OSTEOPOROSIS: ICD-10-CM

## 2022-02-10 DIAGNOSIS — L94.2 CALCINOSIS CUTIS: Primary | ICD-10-CM

## 2022-02-10 DIAGNOSIS — K04.7 DENTAL INFECTION: ICD-10-CM

## 2022-02-10 DIAGNOSIS — Z23 NEED FOR INFLUENZA VACCINATION: ICD-10-CM

## 2022-02-10 DIAGNOSIS — E78.2 MIXED HYPERLIPIDEMIA: ICD-10-CM

## 2022-02-10 DIAGNOSIS — G89.29 OTHER CHRONIC PAIN: ICD-10-CM

## 2022-02-10 PROCEDURE — 80305 DRUG TEST PRSMV DIR OPT OBS: CPT | Performed by: FAMILY MEDICINE

## 2022-02-10 PROCEDURE — 90662 IIV NO PRSV INCREASED AG IM: CPT | Performed by: FAMILY MEDICINE

## 2022-02-10 PROCEDURE — G0008 ADMIN INFLUENZA VIRUS VAC: HCPCS | Performed by: FAMILY MEDICINE

## 2022-02-10 PROCEDURE — 99214 OFFICE O/P EST MOD 30 MIN: CPT | Performed by: FAMILY MEDICINE

## 2022-02-10 RX ORDER — MINOCYCLINE HYDROCHLORIDE 50 MG/1
50 CAPSULE ORAL DAILY
Qty: 30 CAPSULE | Refills: 2 | Status: SHIPPED | OUTPATIENT
Start: 2022-02-10 | End: 2022-04-11

## 2022-02-10 RX ORDER — AMOXICILLIN 500 MG/1
CAPSULE ORAL
COMMUNITY
Start: 2022-02-08 | End: 2022-02-15

## 2022-02-10 RX ORDER — DENOSUMAB 60 MG/ML
60 INJECTION SUBCUTANEOUS
Qty: 180 ML | Refills: 1 | Status: SHIPPED | OUTPATIENT
Start: 2022-02-10

## 2022-02-10 NOTE — PROGRESS NOTES
Subjective   Eva Borrego is a 71 y.o. female.     Chief Complaint   Patient presents with   • Skin Lesion     Not due yet but will need Hydro[facility]one refill to [facility]    • Osteoporosis   • Hyperlipidemia   • Pain         Current Outpatient Medications:   •  albuterol sulfate HFA (Ventolin HFA) 108 (90 Base) MCG/ACT inhaler, Inhale 2 puffs Every 6 (Six) Hours As Needed for Wheezing or Shortness of Air., Disp: 18 g, Rfl: 2  •  ALLEGRA-D ALLERGY & CONGESTION  MG per 12 hr tablet, TAKE ONE TABLET BY MOUTH TWICE A DAY AS NEEDED FOR ALLERGIES (Patient taking differently: Take 1 tablet by mouth Daily.), Disp: 60 tablet, Rfl: 0  •  budesonide-formoterol (SYMBICORT) 160-4.5 MCG/ACT inhaler, INHALE TWO PUFFS BY MOUTH TWICE A DAY, Disp: 10.2 g, Rfl: 2  •  ipratropium-albuterol (DUO-NEB) 0.5-2.5 mg/3 ml nebulizer, INHALE ONE VIAL VIA NEBULIZER BY MOUTH EVERY 4 HOURS AS NEEDED FOR WHEEZING, Disp: 360 mL, Rfl: 0  •  levothyroxine (SYNTHROID, LEVOTHROID) 100 MCG tablet, Take 1 tablet by mouth Daily., Disp: 90 tablet, Rfl: 3  •  omeprazole (priLOSEC) 20 MG capsule, TAKE ONE CAPSULE BY MOUTH DAILY, Disp: 90 capsule, Rfl: 1  •  simvastatin (ZOCOR) 10 MG tablet, Take 1 tablet by mouth Every Night., Disp: 90 tablet, Rfl: 3  •  vitamin D3 125 MCG (5000 UT) capsule capsule, Take 1 tablet on Monday,Wednesday and Friday., Disp: 30 capsule, Rfl: 0  •  denosumab (Prolia) 60 MG/ML solution prefilled syringe syringe, Inject 1 mL under the skin into the appropriate area as directed Every 6 (Six) Months., Disp: 180 mL, Rfl: 1  •  HYDROcodone-acetaminophen (Norco) 7.5-325 MG per tablet, Take 1 tablet by mouth Every 6 (Six) Hours As Needed for Severe Pain ., Disp: 120 tablet, Rfl: 0  •  minocycline (MINOCIN,DYNACIN) 50 MG capsule, Take 1 capsule by mouth Daily., Disp: 30 capsule, Rfl: 2    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   • CHOL 04/18/2012   • COPD 04/16/2014   •  DEXA      =( -0.1/ -3.0);  =(0.7/ -2.9);  = (0.9/ -2.7)   • GERD    • Hypothyroidism 2012   • MAMMO     NEG = 2020   • 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   • Sacroiliac joint pain 2016   • Telangiectasia 6/15/2016   • VACCINES     PNA 23/ Flu= 2020   • Vertigo 2011       Past Surgical History:   Procedure Laterality Date   • CHOLECYSTECTOMY     • COLONOSCOPY      TA = 2021, rech              GSI   • EYE SURGERY      B/L Cataract/ Lens Lt   • JOINT REPLACEMENT Right     THR---2017   • SPINE SURGERY      Discectomy Lumbar   • TONSILLECTOMY         Family History   Problem Relation Age of Onset   • Diabetes Mother    • Hyperlipidemia Father    • Aortic aneurysm Father    • Arthritis Maternal Grandmother    • Osteoporosis Maternal Grandmother    • Stroke Paternal Grandfather    • Alcohol abuse Sister    • Kidney disease Brother         HD   • COPD Brother    • Hypertension Brother    • Arthritis Brother        Social History     Socioeconomic History   • Marital status:    Tobacco Use   • Smoking status: Former Smoker     Packs/day: 1.00     Years: 40.00     Pack years: 40.00     Types: Cigarettes     Quit date: 10/11/2012     Years since quittin.3   • Smokeless tobacco: Never Used   • Tobacco comment: Vapes 3mL-8   Vaping Use   • Vaping Use: Never used   Substance and Sexual Activity   • Alcohol use: No   • Drug use: No   • Sexual activity: Defer       70y/o C female here for 6mos f/u on chronic pain/ COPD/ CHOL/ Hypothyroid/ GERD    Pt feels she is doing well on current meds/ doses  Pt also states she has a dental infection and cant get to her dentist for a few days           The following portions of the patient's history were reviewed and updated as appropriate: allergies, current medications, past family history, past medical history, past social history, past surgical history and problem  list.    Review of Systems   Constitutional: Negative for activity change, appetite change, unexpected weight gain and unexpected weight loss.   Eyes: Negative for blurred vision, double vision, pain and visual disturbance.   Cardiovascular: Negative for leg swelling.   Gastrointestinal: Negative for abdominal distention, abdominal pain, constipation, diarrhea, nausea and vomiting.   Endocrine: Negative for polydipsia, polyphagia and polyuria.   Genitourinary: Negative for frequency.   Musculoskeletal: Positive for arthralgias and myalgias. Negative for gait problem.   Skin: Negative for color change, dry skin, rash and skin lesions.   Neurological: Negative for weakness and numbness.       Vitals:    02/10/22 1332   BP: 115/79   Pulse: 109   Resp: 14   Temp: 98.7 °F (37.1 °C)   SpO2: 98%       Objective   Physical Exam  Vitals and nursing note reviewed.   Constitutional:       General: She is not in acute distress.     Appearance: Normal appearance. She is well-developed. She is not ill-appearing or toxic-appearing.   Cardiovascular:      Rate and Rhythm: Normal rate and regular rhythm.      Heart sounds: Normal heart sounds. No murmur heard.      Pulmonary:      Effort: Pulmonary effort is normal. No respiratory distress.      Breath sounds: Normal breath sounds. No stridor. No wheezing, rhonchi or rales.   Musculoskeletal:         General: No tenderness or deformity.   Feet:      Right foot:      Skin integrity: No ulcer, blister or warmth.      Toenail Condition: ingrown     Left foot:      Skin integrity: No blister, warmth or callus.      Toenail Condition: ingrown  Skin:     General: Skin is warm and dry.      Capillary Refill: Capillary refill takes less than 2 seconds.      Findings: Lesion and rash present. No erythema. Rash is nodular.          Neurological:      Mental Status: She is alert and oriented to person, place, and time.      Cranial Nerves: No cranial nerve deficit.   Psychiatric:          Attention and Perception: Attention normal.         Mood and Affect: Mood and affect normal.         Speech: Speech normal.         Behavior: Behavior normal. Behavior is cooperative.         Thought Content: Thought content normal.         Cognition and Memory: Cognition and memory normal.         Judgment: Judgment normal.           Assessment/Plan   Diagnoses and all orders for this visit:    1. Calcinosis cutis (Primary)    2. Dental infection    3. Mixed hyperlipidemia  -     Lipid Panel; Future  -     Comprehensive Metabolic Panel; Future    4. Acquired hypothyroidism  -     TSH; Future  -     T4, Free; Future    5. Other chronic pain  -     POC Urine Drug Screen, Triage    6. Senile osteoporosis  -     Vitamin D 25 Hydroxy; Future    7. Need for influenza vaccination  -     Fluzone High-Dose 65+yrs    Other orders  -     minocycline (MINOCIN,DYNACIN) 50 MG capsule; Take 1 capsule by mouth Daily.  Dispense: 30 capsule; Refill: 2  -     vitamin D3 125 MCG (5000 UT) capsule capsule; Take 1 tablet on Monday,Wednesday and Friday.  Dispense: 30 capsule; Refill: 0  -     denosumab (Prolia) 60 MG/ML solution prefilled syringe syringe; Inject 1 mL under the skin into the appropriate area as directed Every 6 (Six) Months.  Dispense: 180 mL; Refill: 1    fasting labs in Auguse  PRolia shot q6mos  Flu shot today  Abx for dental infection  Fasting labs in Aug

## 2022-02-15 ENCOUNTER — TELEPHONE (OUTPATIENT)
Dept: ONCOLOGY | Facility: HOSPITAL | Age: 72
End: 2022-02-15

## 2022-02-15 NOTE — TELEPHONE ENCOUNTER
Received call from patient asking to reschedule her Prolia appointment for 3/3/22 as she will be out of town on original appointment date.  Appointment rescheduled for 3/3/22 at 1500.

## 2022-02-18 DIAGNOSIS — M53.3 SACROILIAC JOINT PAIN: ICD-10-CM

## 2022-02-18 RX ORDER — HYDROCODONE BITARTRATE AND ACETAMINOPHEN 7.5; 325 MG/1; MG/1
1 TABLET ORAL EVERY 6 HOURS PRN
Qty: 120 TABLET | Refills: 0 | Status: SHIPPED | OUTPATIENT
Start: 2022-02-18 | End: 2022-03-22 | Stop reason: SDUPTHER

## 2022-02-18 NOTE — TELEPHONE ENCOUNTER
Caller: Eva Borrego    Relationship: Self    Best call back number: [phone]    Requested Prescriptions:   Requested Prescriptions     Pending Prescriptions Disp Refills   • HYDROcodone-acetaminophen (Norco) 7.5-325 MG per tablet 120 tablet 0     Sig: Take 1 tablet by mouth Every 6 (Six) Hours As Needed for Severe Pain .        Pharmacy where request should be sent: [facility] [address] [place] [address] 403 AT [facility] [address] [place] [address] - [phone] [facility] [phone] FX     Additional details provided by patient: THE PATIENT ONLY HAS 5 TABLETS OF THIS MEDICATION LEFT. SHE WILL NEED A REFILL BY TOMORROW OR Monday.     Does the patient have less than a 3 day supply:  [x] Yes  [] No    Bryce Cortez Rep   02/18/22 14:33 EST

## 2022-02-18 NOTE — TELEPHONE ENCOUNTER
Rx Refill Note  Requested Prescriptions     Pending Prescriptions Disp Refills   • HYDROcodone-acetaminophen (Norco) 7.5-325 MG per tablet 120 tablet 0     Sig: Take 1 tablet by mouth Every 6 (Six) Hours As Needed for Severe Pain .      Last office visit with prescribing clinician: 2/10/2022      Next office visit with prescribing clinician: 8/12/2022     Lipid Panel (10/22/2021 08:19)  Comprehensive Metabolic Panel (08/17/2021 15:24)         Radha Obrien CMA  02/18/22, 14:50 EST     INSPECT in chart

## 2022-02-25 ENCOUNTER — HOSPITAL ENCOUNTER (OUTPATIENT)
Dept: ONCOLOGY | Facility: HOSPITAL | Age: 72
Setting detail: INFUSION SERIES
End: 2022-02-25

## 2022-03-02 ENCOUNTER — HOSPITAL ENCOUNTER (OUTPATIENT)
Dept: ONCOLOGY | Facility: HOSPITAL | Age: 72
Setting detail: INFUSION SERIES
Discharge: HOME | End: 2022-03-02

## 2022-03-02 VITALS
HEART RATE: 112 BPM | BODY MASS INDEX: 21.79 KG/M2 | RESPIRATION RATE: 18 BRPM | HEIGHT: 63 IN | OXYGEN SATURATION: 90 % | SYSTOLIC BLOOD PRESSURE: 124 MMHG | WEIGHT: 123 LBS | DIASTOLIC BLOOD PRESSURE: 83 MMHG | TEMPERATURE: 97.4 F

## 2022-03-02 DIAGNOSIS — M81.0 SENILE OSTEOPOROSIS: Primary | ICD-10-CM

## 2022-03-02 NOTE — PROGRESS NOTES
Pt here for Prolia injection. Pt hasn't had labs drawn since August. Pt also states that she had a split tooth fixed 3 weeks ago with an infection and finished her antibiotics 2 weeks ago. Pt is to f/u with Dentist in 2 more weeks. I spoke with Dr. Larsen and she agreed to hold Prolia until pt cleared by Dentist. I advised pt to contact Dr. Bliss office to have labs drawn prior to her Prolia injection. Pt verbalized understanding.

## 2022-03-04 RX ORDER — IPRATROPIUM BROMIDE AND ALBUTEROL SULFATE 2.5; .5 MG/3ML; MG/3ML
SOLUTION RESPIRATORY (INHALATION)
Qty: 360 ML | Refills: 0 | Status: SHIPPED | OUTPATIENT
Start: 2022-03-04 | End: 2022-07-05

## 2022-03-04 NOTE — TELEPHONE ENCOUNTER
Rx Refill Note  Requested Prescriptions     Pending Prescriptions Disp Refills   • ipratropium-albuterol (DUO-NEB) 0.5-2.5 mg/3 ml nebulizer [Pharmacy Med Name: IPRAT-ALBUT 0.5-3 MG/3 ML (30 VLS)] 360 mL 0     Sig: USE 1 VIAL PER NEBULIZER EVERY 4 HOURS AS NEEDED FOR WHEEZING      Last office visit with prescribing clinician: 2/10/2022      Next office visit with prescribing clinician: 8/12/2022     Lipid Panel (10/22/2021 08:19)  Comprehensive Metabolic Panel (08/17/2021 15:24)         Radha Obrien, WINSOME  03/04/22, 12:37 EST

## 2022-03-22 DIAGNOSIS — M53.3 SACROILIAC JOINT PAIN: ICD-10-CM

## 2022-03-22 RX ORDER — HYDROCODONE BITARTRATE AND ACETAMINOPHEN 7.5; 325 MG/1; MG/1
1 TABLET ORAL EVERY 6 HOURS PRN
Qty: 120 TABLET | Refills: 0 | Status: SHIPPED | OUTPATIENT
Start: 2022-03-22 | End: 2022-04-25 | Stop reason: SDUPTHER

## 2022-03-22 NOTE — TELEPHONE ENCOUNTER
Caller: Eva Borrego    Relationship: Self    Best call back number: [phone]    Requested Prescriptions:   Requested Prescriptions     Pending Prescriptions Disp Refills   • HYDROcodone-acetaminophen (Norco) 7.5-325 MG per tablet 120 tablet 0     Sig: Take 1 tablet by mouth Every 6 (Six) Hours As Needed for Severe Pain .        Pharmacy where request should be sent: [facility] [address] [place] [address] 403 AT [address] [place] [address] - [phone] [facility] [phone] FX     Additional details provided by patient: 2 LEFT  Does the patient have less than a 3 day supply:  [x] Yes  [] No    Bryce Martinez Rep   03/22/22 15:20 EDT

## 2022-03-22 NOTE — TELEPHONE ENCOUNTER
Rx Refill Note  Requested Prescriptions     Pending Prescriptions Disp Refills   • HYDROcodone-acetaminophen (Norco) 7.5-325 MG per tablet 120 tablet 0     Sig: Take 1 tablet by mouth Every 6 (Six) Hours As Needed for Severe Pain .      Last office visit with prescribing clinician: 2/10/2022      Next office visit with prescribing clinician: 8/12/2022            Charisma Villegas, RT  03/22/22, 15:26 EDT

## 2022-04-04 RX ORDER — BUDESONIDE AND FORMOTEROL FUMARATE DIHYDRATE 160; 4.5 UG/1; UG/1
AEROSOL RESPIRATORY (INHALATION)
Qty: 10.2 G | Refills: 2 | Status: SHIPPED | OUTPATIENT
Start: 2022-04-04 | End: 2022-07-05

## 2022-04-04 NOTE — TELEPHONE ENCOUNTER
Rx Refill Note  Requested Prescriptions     Pending Prescriptions Disp Refills   • budesonide-formoterol (SYMBICORT) 160-4.5 MCG/ACT inhaler [Pharmacy Med Name: BUDESONIDE-FORMOTER 160-4.5 MCG INH] 10.2 g 2     Sig: INHALE TWO PUFFS BY MOUTH TWICE A DAY      Last office visit with prescribing clinician: 2/10/2022      Next office visit with prescribing clinician: 4/11/2022     Lipid Panel (10/22/2021 08:19)  Comprehensive Metabolic Panel (08/17/2021 15:24)         Radha Obrien CMA  04/04/22, 13:06 EDT

## 2022-04-11 ENCOUNTER — OFFICE VISIT (OUTPATIENT)
Dept: FAMILY MEDICINE CLINIC | Facility: CLINIC | Age: 72
End: 2022-04-11

## 2022-04-11 VITALS
OXYGEN SATURATION: 98 % | DIASTOLIC BLOOD PRESSURE: 82 MMHG | HEART RATE: 112 BPM | SYSTOLIC BLOOD PRESSURE: 125 MMHG | WEIGHT: 125 LBS | TEMPERATURE: 97.8 F | BODY MASS INDEX: 22.15 KG/M2 | RESPIRATION RATE: 14 BRPM | HEIGHT: 63 IN

## 2022-04-11 DIAGNOSIS — M81.0 AGE-RELATED OSTEOPOROSIS WITHOUT CURRENT PATHOLOGICAL FRACTURE: ICD-10-CM

## 2022-04-11 DIAGNOSIS — M81.0 SENILE OSTEOPOROSIS: ICD-10-CM

## 2022-04-11 DIAGNOSIS — I87.2 VENOUS INSUFFICIENCY: Primary | ICD-10-CM

## 2022-04-11 DIAGNOSIS — Z12.31 ENCOUNTER FOR SCREENING MAMMOGRAM FOR BREAST CANCER: ICD-10-CM

## 2022-04-11 DIAGNOSIS — E78.2 MIXED HYPERLIPIDEMIA: ICD-10-CM

## 2022-04-11 PROCEDURE — 80053 COMPREHEN METABOLIC PANEL: CPT | Performed by: FAMILY MEDICINE

## 2022-04-11 PROCEDURE — 36415 COLL VENOUS BLD VENIPUNCTURE: CPT | Performed by: FAMILY MEDICINE

## 2022-04-11 PROCEDURE — 99213 OFFICE O/P EST LOW 20 MIN: CPT | Performed by: FAMILY MEDICINE

## 2022-04-11 PROCEDURE — 82306 VITAMIN D 25 HYDROXY: CPT | Performed by: FAMILY MEDICINE

## 2022-04-11 NOTE — PROGRESS NOTES
Venipuncture performed in left arm by Radha Obrien CMA  with good hemostasis. Patient tolerated well. 04/11/22 Radha Obrien CMA

## 2022-04-11 NOTE — PROGRESS NOTES
Subjective   Eva Borrego is a 71 y.o. female.     Chief Complaint   Patient presents with   • Foot Swelling     Left foot and ankle swelling    • Osteoporosis     Patient states that she has to have her calcium checked before she can get her next prolia shot.          Current Outpatient Medications:   •  albuterol sulfate HFA (Ventolin HFA) 108 (90 Base) MCG/ACT inhaler, Inhale 2 puffs Every 6 (Six) Hours As Needed for Wheezing or Shortness of Air., Disp: 18 g, Rfl: 2  •  ALLEGRA-D ALLERGY & CONGESTION  MG per 12 hr tablet, TAKE ONE TABLET BY MOUTH TWICE A DAY AS NEEDED FOR ALLERGIES (Patient taking differently: Take 1 tablet by mouth Daily.), Disp: 60 tablet, Rfl: 0  •  budesonide-formoterol (SYMBICORT) 160-4.5 MCG/ACT inhaler, INHALE TWO PUFFS BY MOUTH TWICE A DAY, Disp: 10.2 g, Rfl: 2  •  denosumab (Prolia) 60 MG/ML solution prefilled syringe syringe, Inject 1 mL under the skin into the appropriate area as directed Every 6 (Six) Months., Disp: 180 mL, Rfl: 1  •  HYDROcodone-acetaminophen (Norco) 7.5-325 MG per tablet, Take 1 tablet by mouth Every 6 (Six) Hours As Needed for Severe Pain ., Disp: 120 tablet, Rfl: 0  •  ipratropium-albuterol (DUO-NEB) 0.5-2.5 mg/3 ml nebulizer, USE 1 VIAL PER NEBULIZER EVERY 4 HOURS AS NEEDED FOR WHEEZING, Disp: 360 mL, Rfl: 0  •  levothyroxine (SYNTHROID, LEVOTHROID) 100 MCG tablet, Take 1 tablet by mouth Daily., Disp: 90 tablet, Rfl: 3  •  omeprazole (priLOSEC) 20 MG capsule, TAKE ONE CAPSULE BY MOUTH DAILY, Disp: 90 capsule, Rfl: 1  •  simvastatin (ZOCOR) 10 MG tablet, Take 1 tablet by mouth Every Night., Disp: 90 tablet, Rfl: 3  •  vitamin D3 125 MCG (5000 UT) capsule capsule, Take 1 tablet on Monday,Wednesday and Friday., Disp: 30 capsule, Rfl: 0    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   • CHOL 04/18/2012   • COPD 04/16/2014   • DEXA     2017 =( -0.1/ -3.0); 2019 =(0.7/ -2.9); 2020 = (0.9/ -2.7)   • GERD    •  Hypothyroidism 2012   • MAMMO     NEG = 2020   • 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   • Sacroiliac joint pain 2016   • Telangiectasia 06/15/2016   • VACCINES     PNA 23/ Flu= 2020   • Vertigo 2011       Past Surgical History:   Procedure Laterality Date   • CHOLECYSTECTOMY     • COLONOSCOPY      TA = 2021, rech              GSI   • EYE SURGERY      B/L Cataract/ Lens Lt   • JOINT REPLACEMENT Right     THR---2017   • SPINE SURGERY      Discectomy Lumbar   • TONSILLECTOMY         Family History   Problem Relation Age of Onset   • Diabetes Mother    • Hyperlipidemia Father    • Aortic aneurysm Father    • Arthritis Maternal Grandmother    • Osteoporosis Maternal Grandmother    • Stroke Paternal Grandfather    • Alcohol abuse Sister    • Kidney disease Brother         HD   • COPD Brother    • Hypertension Brother    • Arthritis Brother        Social History     Socioeconomic History   • Marital status:    Tobacco Use   • Smoking status: Former Smoker     Packs/day: 1.00     Years: 40.00     Pack years: 40.00     Types: Cigarettes     Quit date: 10/11/2012     Years since quittin.5   • Smokeless tobacco: Never Used   • Tobacco comment: Vapes 3mL-8   Vaping Use   • Vaping Use: Never used   Substance and Sexual Activity   • Alcohol use: No   • Drug use: No   • Sexual activity: Defer       72 y/o C female here for pain/ osteopenia           The following portions of the patient's history were reviewed and updated as appropriate: allergies, current medications, past family history, past medical history, past social history, past surgical history and problem list.    Review of Systems    Vitals:    22 0807   BP: 125/82   Pulse: 112   Resp: 14   Temp: 97.8 °F (36.6 °C)   SpO2: 98%       Objective   Physical Exam  Vitals and nursing note reviewed.   Constitutional:       General: She is not in acute distress.      Appearance: She is well-developed. She is not ill-appearing or toxic-appearing.   HENT:      Head: Normocephalic and atraumatic.   Cardiovascular:      Rate and Rhythm: Normal rate and regular rhythm.      Heart sounds: Normal heart sounds. No murmur heard.  Pulmonary:      Effort: Pulmonary effort is normal. No respiratory distress.      Breath sounds: Examination of the right-upper field reveals decreased breath sounds. Examination of the left-upper field reveals decreased breath sounds. Examination of the right-middle field reveals decreased breath sounds. Examination of the left-middle field reveals decreased breath sounds. Examination of the right-lower field reveals decreased breath sounds. Examination of the left-lower field reveals decreased breath sounds. Decreased breath sounds present.   Musculoskeletal:      Cervical back: Normal range of motion and neck supple.      Left foot: Swelling present.      Comments: +mild distal Left LE Tr pitting edema-----no erythema or calf pain    Skin:     General: Skin is warm and dry.      Findings: No rash.   Neurological:      Mental Status: She is alert and oriented to person, place, and time.      Cranial Nerves: No cranial nerve deficit.   Psychiatric:         Attention and Perception: Attention and perception normal.         Mood and Affect: Mood and affect normal.         Speech: Speech normal.         Behavior: Behavior normal. Behavior is cooperative.         Thought Content: Thought content normal.         Cognition and Memory: Cognition and memory normal.         Judgment: Judgment normal.           Assessment/Plan   Diagnoses and all orders for this visit:    1. Venous insufficiency (Primary)    2. Age-related osteoporosis without current pathological fracture    3. Encounter for screening mammogram for breast cancer  -     Mammo Screening Digital Tomosynthesis Bilateral With CAD; Future

## 2022-04-12 LAB
25(OH)D3 SERPL-MCNC: 55.4 NG/ML (ref 30–100)
ALBUMIN SERPL-MCNC: 4.5 G/DL (ref 3.5–5.2)
ALBUMIN/GLOB SERPL: 1.8 G/DL
ALP SERPL-CCNC: 66 U/L (ref 39–117)
ALT SERPL W P-5'-P-CCNC: 16 U/L (ref 1–33)
ANION GAP SERPL CALCULATED.3IONS-SCNC: 12.3 MMOL/L (ref 5–15)
AST SERPL-CCNC: 20 U/L (ref 1–32)
BILIRUB SERPL-MCNC: 0.3 MG/DL (ref 0–1.2)
BUN SERPL-MCNC: 16 MG/DL (ref 8–23)
BUN/CREAT SERPL: 32.7 (ref 7–25)
CALCIUM SPEC-SCNC: 9.9 MG/DL (ref 8.6–10.5)
CHLORIDE SERPL-SCNC: 101 MMOL/L (ref 98–107)
CO2 SERPL-SCNC: 25.7 MMOL/L (ref 22–29)
CREAT SERPL-MCNC: 0.49 MG/DL (ref 0.57–1)
EGFRCR SERPLBLD CKD-EPI 2021: 100.9 ML/MIN/1.73
GLOBULIN UR ELPH-MCNC: 2.5 GM/DL
GLUCOSE SERPL-MCNC: 110 MG/DL (ref 65–99)
POTASSIUM SERPL-SCNC: 4.2 MMOL/L (ref 3.5–5.2)
PROT SERPL-MCNC: 7 G/DL (ref 6–8.5)
SODIUM SERPL-SCNC: 139 MMOL/L (ref 136–145)

## 2022-04-22 ENCOUNTER — HOSPITAL ENCOUNTER (OUTPATIENT)
Dept: MAMMOGRAPHY | Facility: HOSPITAL | Age: 72
Discharge: HOME | End: 2022-04-22
Admitting: FAMILY MEDICINE

## 2022-04-22 DIAGNOSIS — Z12.31 ENCOUNTER FOR SCREENING MAMMOGRAM FOR BREAST CANCER: ICD-10-CM

## 2022-04-22 PROCEDURE — 77063 BREAST TOMOSYNTHESIS BI: CPT

## 2022-04-22 PROCEDURE — 77067 SCR MAMMO BI INCL CAD: CPT

## 2022-04-25 DIAGNOSIS — M53.3 SACROILIAC JOINT PAIN: ICD-10-CM

## 2022-04-25 RX ORDER — HYDROCODONE BITARTRATE AND ACETAMINOPHEN 7.5; 325 MG/1; MG/1
1 TABLET ORAL EVERY 6 HOURS PRN
Qty: 120 TABLET | Refills: 0 | Status: SHIPPED | OUTPATIENT
Start: 2022-04-25 | End: 2022-05-27 | Stop reason: SDUPTHER

## 2022-04-25 NOTE — TELEPHONE ENCOUNTER
Caller: Eva Borrego    Relationship: Self    Best call back number: [phone]    Requested Prescriptions:   Requested Prescriptions     Pending Prescriptions Disp Refills   • HYDROcodone-acetaminophen (Norco) 7.5-325 MG per tablet 120 tablet 0     Sig: Take 1 tablet by mouth Every 6 (Six) Hours As Needed for Severe Pain .        Pharmacy where request should be sent: [facility] [address] [place] [address] 403 AT [address] [place] [address] - [phone] [facility] [phone] FX     Additional details provided by patient: 1 DAY LEFT    Does the patient have less than a 3 day supply:  [x] Yes  [] No    Bryce Wallace Rep   04/25/22 11:31 EDT

## 2022-04-29 ENCOUNTER — HOSPITAL ENCOUNTER (OUTPATIENT)
Dept: ONCOLOGY | Facility: HOSPITAL | Age: 72
Setting detail: INFUSION SERIES
Discharge: HOME | End: 2022-04-29

## 2022-04-29 VITALS
BODY MASS INDEX: 22.32 KG/M2 | HEART RATE: 99 BPM | HEIGHT: 63 IN | DIASTOLIC BLOOD PRESSURE: 54 MMHG | WEIGHT: 126 LBS | RESPIRATION RATE: 18 BRPM | SYSTOLIC BLOOD PRESSURE: 128 MMHG

## 2022-04-29 DIAGNOSIS — M81.0 SENILE OSTEOPOROSIS: Primary | ICD-10-CM

## 2022-04-29 LAB
ALBUMIN SERPL-MCNC: 4 G/DL (ref 3.5–5.2)
ALBUMIN/GLOB SERPL: 1.4 G/DL
ALP SERPL-CCNC: 70 U/L (ref 39–117)
ALT SERPL W P-5'-P-CCNC: 14 U/L (ref 1–33)
ANION GAP SERPL CALCULATED.3IONS-SCNC: 10 MMOL/L (ref 5–15)
AST SERPL-CCNC: 21 U/L (ref 1–32)
BILIRUB SERPL-MCNC: 0.2 MG/DL (ref 0–1.2)
BUN SERPL-MCNC: 11 MG/DL (ref 8–23)
BUN/CREAT SERPL: 20.8 (ref 7–25)
CALCIUM SPEC-SCNC: 9.7 MG/DL (ref 8.6–10.5)
CHLORIDE SERPL-SCNC: 101 MMOL/L (ref 98–107)
CO2 SERPL-SCNC: 28 MMOL/L (ref 22–29)
CREAT SERPL-MCNC: 0.53 MG/DL (ref 0.57–1)
EGFRCR SERPLBLD CKD-EPI 2021: 99 ML/MIN/1.73
GLOBULIN UR ELPH-MCNC: 2.9 GM/DL
GLUCOSE SERPL-MCNC: 104 MG/DL (ref 65–99)
MAGNESIUM SERPL-MCNC: 1.8 MG/DL (ref 1.6–2.4)
PHOSPHATE SERPL-MCNC: 4 MG/DL (ref 2.5–4.5)
POTASSIUM SERPL-SCNC: 4.5 MMOL/L (ref 3.5–5.2)
PROT SERPL-MCNC: 6.9 G/DL (ref 6–8.5)
SODIUM SERPL-SCNC: 139 MMOL/L (ref 136–145)

## 2022-04-29 PROCEDURE — 80053 COMPREHEN METABOLIC PANEL: CPT | Performed by: FAMILY MEDICINE

## 2022-04-29 PROCEDURE — 84100 ASSAY OF PHOSPHORUS: CPT | Performed by: FAMILY MEDICINE

## 2022-04-29 PROCEDURE — 96372 THER/PROPH/DIAG INJ SC/IM: CPT

## 2022-04-29 PROCEDURE — 25010000002 DENOSUMAB 60 MG/ML SOLUTION PREFILLED SYRINGE: Performed by: FAMILY MEDICINE

## 2022-04-29 PROCEDURE — 83735 ASSAY OF MAGNESIUM: CPT | Performed by: FAMILY MEDICINE

## 2022-04-29 RX ADMIN — DENOSUMAB 60 MG: 60 INJECTION SUBCUTANEOUS at 15:42

## 2022-04-29 NOTE — PROGRESS NOTES
Patient here for Prolia injection.I reviewed CMP results and patient reports that she had labs as ordered recently per . Patient will have labs drawn per lab staff today per Prolia plan. Patient reports that she is taking medications as ordered per M.D.Patient gave Prolia  60mg SQ in right arm and tolerated well. Patient and her spouse made aware of next Prolia injection appointment.

## 2022-05-27 DIAGNOSIS — M53.3 SACROILIAC JOINT PAIN: ICD-10-CM

## 2022-05-27 RX ORDER — HYDROCODONE BITARTRATE AND ACETAMINOPHEN 7.5; 325 MG/1; MG/1
1 TABLET ORAL EVERY 6 HOURS PRN
Qty: 120 TABLET | Refills: 0 | Status: SHIPPED | OUTPATIENT
Start: 2022-05-27 | End: 2022-06-27 | Stop reason: SDUPTHER

## 2022-05-27 NOTE — TELEPHONE ENCOUNTER
Caller: Eva Borrego    Relationship: Self    Best call back number: [phone]    Requested Prescriptions:   Requested Prescriptions     Pending Prescriptions Disp Refills   • HYDROcodone-acetaminophen (Norco) 7.5-325 MG per tablet 120 tablet 0     Sig: Take 1 tablet by mouth Every 6 (Six) Hours As Needed for Severe Pain .        Pharmacy where request should be sent: [facility] [address] [place] [address] 403 AT [address] [place] [address] - [phone] [facility] [phone] FX     Additional details provided by patient: N/A    Does the patient have less than a 3 day supply:  [x] Yes  [] No    Bryce Wallace Rep   05/27/22 11:18 EDT

## 2022-06-27 DIAGNOSIS — M53.3 SACROILIAC JOINT PAIN: ICD-10-CM

## 2022-06-27 RX ORDER — HYDROCODONE BITARTRATE AND ACETAMINOPHEN 7.5; 325 MG/1; MG/1
1 TABLET ORAL EVERY 6 HOURS PRN
Qty: 120 TABLET | Refills: 0 | Status: SHIPPED | OUTPATIENT
Start: 2022-06-27 | End: 2022-07-28 | Stop reason: SDUPTHER

## 2022-06-27 NOTE — TELEPHONE ENCOUNTER
Caller: Eva Borrego    Relationship: Self    Best call back number: [phone]       Requested Prescriptions:   Requested Prescriptions     Pending Prescriptions Disp Refills   • HYDROcodone-acetaminophen (Norco) 7.5-325 MG per tablet 120 tablet 0     Sig: Take 1 tablet by mouth Every 6 (Six) Hours As Needed for Severe Pain .        Pharmacy where request should be sent: [facility] [address] [place] [address] 403 AT Replaced by [facility] [address] & [place] [address] - [phone] [facility] [phone] FX     Additional details provided by patient: PATIENT HAS A 1 DAY SUPPLY LEFT OF MEDICATION     Does the patient have less than a 3 day supply:  [x] Yes  [] No    Bryce Reed Rep   06/27/22 14:17 EDT

## 2022-07-05 RX ORDER — IPRATROPIUM BROMIDE AND ALBUTEROL SULFATE 2.5; .5 MG/3ML; MG/3ML
SOLUTION RESPIRATORY (INHALATION)
Qty: 360 ML | Refills: 0 | Status: SHIPPED | OUTPATIENT
Start: 2022-07-05 | End: 2022-11-21

## 2022-07-05 RX ORDER — BUDESONIDE AND FORMOTEROL FUMARATE DIHYDRATE 160; 4.5 UG/1; UG/1
AEROSOL RESPIRATORY (INHALATION)
Qty: 10.2 G | Refills: 2 | Status: SHIPPED | OUTPATIENT
Start: 2022-07-05 | End: 2022-09-12

## 2022-07-05 NOTE — TELEPHONE ENCOUNTER
Rx Refill Note  Requested Prescriptions     Pending Prescriptions Disp Refills   • ipratropium-albuterol (DUO-NEB) 0.5-2.5 mg/3 ml nebulizer [Pharmacy Med Name: IPRAT-ALBUT 0.5-3 MG/3 ML (30 VLS)] 360 mL 0     Sig: USE 1 VIAL PER NEBULIZER EVERY 4 HOURS AS NEEDED FOR WHEEZING   • budesonide-formoterol (SYMBICORT) 160-4.5 MCG/ACT inhaler [Pharmacy Med Name: BUDESONIDE-FORMOTER 160-4.5 MCG INH] 10.2 g 2     Sig: INHALE TWO PUFFS BY MOUTH TWICE A DAY      Last office visit with prescribing clinician: 4/11/2022      Next office visit with prescribing clinician: 8/12/2022     Comprehensive Metabolic Panel (04/29/2022 15:36)  Lipid Panel (10/22/2021 08:19)         Radha Obrien, WINSOME  07/05/22, 15:22 EDT

## 2022-07-28 DIAGNOSIS — M53.3 SACROILIAC JOINT PAIN: ICD-10-CM

## 2022-07-28 RX ORDER — HYDROCODONE BITARTRATE AND ACETAMINOPHEN 7.5; 325 MG/1; MG/1
1 TABLET ORAL EVERY 6 HOURS PRN
Qty: 120 TABLET | Refills: 0 | Status: SHIPPED | OUTPATIENT
Start: 2022-07-28 | End: 2022-08-29 | Stop reason: SDUPTHER

## 2022-07-28 NOTE — TELEPHONE ENCOUNTER
Caller: Eva Borrego    Relationship: Self    Best call back number: [phone]    Requested Prescriptions:   Requested Prescriptions     Pending Prescriptions Disp Refills   • HYDROcodone-acetaminophen (Norco) 7.5-325 MG per tablet 120 tablet 0     Sig: Take 1 tablet by mouth Every 6 (Six) Hours As Needed for Severe Pain .        Pharmacy where request should be sent: [facility] [address] [place] [address] 403 AT [facility] 3 & [place] [address] - [phone] [facility] [phone] FX     Additional details provided by patient: PATIENT STATES SHE HAS ONE DAY OF MEDICATION REMAINING.    Does the patient have less than a 3 day supply:  [x] Yes  [] No    Shadi López   07/28/22 15:33 EDT

## 2022-08-05 ENCOUNTER — CLINICAL SUPPORT (OUTPATIENT)
Dept: FAMILY MEDICINE CLINIC | Facility: CLINIC | Age: 72
End: 2022-08-05

## 2022-08-05 DIAGNOSIS — E03.9 ACQUIRED HYPOTHYROIDISM: ICD-10-CM

## 2022-08-05 DIAGNOSIS — E78.2 MIXED HYPERLIPIDEMIA: ICD-10-CM

## 2022-08-05 PROCEDURE — 84443 ASSAY THYROID STIM HORMONE: CPT | Performed by: FAMILY MEDICINE

## 2022-08-05 PROCEDURE — 80061 LIPID PANEL: CPT | Performed by: FAMILY MEDICINE

## 2022-08-05 PROCEDURE — 84439 ASSAY OF FREE THYROXINE: CPT | Performed by: FAMILY MEDICINE

## 2022-08-05 PROCEDURE — 36415 COLL VENOUS BLD VENIPUNCTURE: CPT | Performed by: FAMILY MEDICINE

## 2022-08-05 NOTE — PROGRESS NOTES
Venipuncture performed in L ARM by RT Jeannie  with good hemostasis. Patient tolerated well. 08/05/22 Charisma Villegas, RT

## 2022-08-06 LAB
CHOLEST SERPL-MCNC: 153 MG/DL (ref 0–200)
HDLC SERPL-MCNC: 53 MG/DL (ref 40–60)
LDLC SERPL CALC-MCNC: 84 MG/DL (ref 0–100)
LDLC/HDLC SERPL: 1.56 {RATIO}
T4 FREE SERPL-MCNC: 1.57 NG/DL (ref 0.93–1.7)
TRIGL SERPL-MCNC: 87 MG/DL (ref 0–150)
TSH SERPL DL<=0.05 MIU/L-ACNC: 0.52 UIU/ML (ref 0.27–4.2)
VLDLC SERPL-MCNC: 16 MG/DL (ref 5–40)

## 2022-08-12 ENCOUNTER — OFFICE VISIT (OUTPATIENT)
Dept: FAMILY MEDICINE CLINIC | Facility: CLINIC | Age: 72
End: 2022-08-12

## 2022-08-12 VITALS
WEIGHT: 130 LBS | TEMPERATURE: 98.4 F | HEIGHT: 63 IN | HEART RATE: 113 BPM | BODY MASS INDEX: 23.04 KG/M2 | OXYGEN SATURATION: 97 % | RESPIRATION RATE: 12 BRPM | DIASTOLIC BLOOD PRESSURE: 73 MMHG | SYSTOLIC BLOOD PRESSURE: 109 MMHG

## 2022-08-12 DIAGNOSIS — E03.9 ACQUIRED HYPOTHYROIDISM: ICD-10-CM

## 2022-08-12 DIAGNOSIS — L94.2 CALCINOSIS CUTIS: Primary | ICD-10-CM

## 2022-08-12 DIAGNOSIS — Z96.649 STATUS POST REVISION OF TOTAL HIP REPLACEMENT: ICD-10-CM

## 2022-08-12 DIAGNOSIS — M79.602 ARM PAIN, LEFT: ICD-10-CM

## 2022-08-12 DIAGNOSIS — M70.22 OLECRANON BURSITIS OF LEFT ELBOW: ICD-10-CM

## 2022-08-12 DIAGNOSIS — J43.1 PANLOBULAR EMPHYSEMA: ICD-10-CM

## 2022-08-12 PROCEDURE — 99214 OFFICE O/P EST MOD 30 MIN: CPT | Performed by: FAMILY MEDICINE

## 2022-08-12 RX ORDER — LEVOTHYROXINE SODIUM 0.1 MG/1
100 TABLET ORAL DAILY
Qty: 90 TABLET | Refills: 1 | Status: SHIPPED | OUTPATIENT
Start: 2022-08-12 | End: 2023-02-09

## 2022-08-12 RX ORDER — SIMVASTATIN 10 MG
10 TABLET ORAL NIGHTLY
Qty: 90 TABLET | Refills: 3 | Status: SHIPPED | OUTPATIENT
Start: 2022-08-12 | End: 2023-08-13

## 2022-08-12 RX ORDER — BUDESONIDE, GLYCOPYRROLATE, AND FORMOTEROL FUMARATE 160; 9; 4.8 UG/1; UG/1; UG/1
2 AEROSOL, METERED RESPIRATORY (INHALATION) 2 TIMES DAILY
Qty: 10.7 G | Refills: 0 | COMMUNITY
Start: 2022-08-12

## 2022-08-12 RX ORDER — OMEPRAZOLE 20 MG/1
20 CAPSULE, DELAYED RELEASE ORAL DAILY
Qty: 90 CAPSULE | Refills: 1 | Status: SHIPPED | OUTPATIENT
Start: 2022-08-12 | End: 2023-03-10

## 2022-08-12 RX ORDER — MULTIPLE VITAMINS W/ MINERALS TAB 9MG-400MCG
1 TAB ORAL DAILY
COMMUNITY

## 2022-08-12 NOTE — PROGRESS NOTES
Subjective   Eva Borrego is a 72 y.o. female.     Chief Complaint   Patient presents with   • COPD     Discuss lab results    • Pain   • Osteoarthritis   • Hyperlipidemia   • Hypothyroidism         Current Outpatient Medications:   •  albuterol sulfate HFA (Ventolin HFA) 108 (90 Base) MCG/ACT inhaler, Inhale 2 puffs Every 6 (Six) Hours As Needed for Wheezing or Shortness of Air., Disp: 18 g, Rfl: 2  •  ALLEGRA-D ALLERGY & CONGESTION  MG per 12 hr tablet, TAKE ONE TABLET BY MOUTH TWICE A DAY AS NEEDED FOR ALLERGIES (Patient taking differently: Take 1 tablet by mouth Daily.), Disp: 60 tablet, Rfl: 0  •  budesonide-formoterol (SYMBICORT) 160-4.5 MCG/ACT inhaler, INHALE TWO PUFFS BY MOUTH TWICE A DAY, Disp: 10.2 g, Rfl: 2  •  denosumab (Prolia) 60 MG/ML solution prefilled syringe syringe, Inject 1 mL under the skin into the appropriate area as directed Every 6 (Six) Months., Disp: 180 mL, Rfl: 1  •  HYDROcodone-acetaminophen (Norco) 7.5-325 MG per tablet, Take 1 tablet by mouth Every 6 (Six) Hours As Needed for Severe Pain ., Disp: 120 tablet, Rfl: 0  •  ipratropium-albuterol (DUO-NEB) 0.5-2.5 mg/3 ml nebulizer, USE 1 VIAL PER NEBULIZER EVERY 4 HOURS AS NEEDED FOR WHEEZING, Disp: 360 mL, Rfl: 0  •  levothyroxine (SYNTHROID, LEVOTHROID) 100 MCG tablet, Take 1 tablet by mouth Daily., Disp: 90 tablet, Rfl: 1  •  multivitamin with minerals (MULTIVITAMIN ADULT PO), Take 1 tablet by mouth Daily., Disp: , Rfl:   •  omeprazole (priLOSEC) 20 MG capsule, Take 1 capsule by mouth Daily., Disp: 90 capsule, Rfl: 1  •  simvastatin (ZOCOR) 10 MG tablet, Take 1 tablet by mouth Every Night., Disp: 90 tablet, Rfl: 3  •  vitamin D3 125 MCG (5000 UT) capsule capsule, Take 1 capsule by mouth Every Other Day. 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   • Sacroiliac joint pain    • Telangiectasia        Past Surgical History:   Procedure Laterality Date   • CHOLECYSTECTOMY     • COLONOSCOPY      TA = 2021, rech              GSI   • EYE SURGERY      B/L Cataract/ Lens Lt   • JOINT REPLACEMENT Right     THR---2017   • SPINE SURGERY      Discectomy Lumbar   • TONSILLECTOMY         Family History   Problem Relation Age of Onset   • Diabetes Mother    • Hyperlipidemia Father    • Aortic aneurysm Father    • Arthritis Maternal Grandmother    • Osteoporosis Maternal Grandmother    • Stroke Paternal Grandfather    • Alcohol abuse Sister    • Kidney disease Brother         HD   • COPD Brother    • Hypertension Brother    • Arthritis Brother        Social History     Socioeconomic History   • Marital status:    Tobacco Use   • Smoking status: Former Smoker     Packs/day: 1.00     Years: 40.00     Pack years: 40.00     Types: Cigarettes     Quit date: 10/11/2012     Years since quittin.8   • Smokeless tobacco: Never Used   • Tobacco comment: Vapes 3mL-8   Vaping Use   • Vaping Use: Never used   Substance and Sexual Activity   • Alcohol use: No   • Drug use: No   • Sexual activity: Defer       73 y/o C female here for f/u on COPD/ OA/ Hypothyroid/ Chol and now w/ Left UE pain      Pt wanting some of the calcifications removed due to pain @ her left elbow/ UE----Pt has seen Derm in past for this and now having some erosion thru the skin and elbow swelling; had them removed from her thumb in past and no recurrence    Pt states the symbicort cost her $200 for one month and will go up to >$400 a month once she hits  the donut hole; Pt did fine on the Breztri and called for paperwork for the [facility] program to see if she can get it free--- she has been using her nebulizer a couple times a day and very helpful over the HFA    Pt states she is wanting her Rt hip XRayed-------She had her first THR done and then a few yrs later when she was having pain, they said she should have had a f/u Xray long before-----she ended up having to get another THR Rt; Pt states she can not do this again---denies pain at Rt hip       The following portions of the patient's history were reviewed and updated as appropriate: allergies, current medications, past family history, past medical history, past social history, past surgical history and problem list.    Review of Systems   Constitutional: Negative for activity change, appetite change, fatigue, unexpected weight gain and unexpected weight loss.   Respiratory: Negative for cough, chest tightness, shortness of breath and wheezing.    Cardiovascular: Negative for chest pain, palpitations and leg swelling.   Gastrointestinal: Negative for constipation and diarrhea.   Genitourinary: Negative for frequency, urgency and urinary incontinence.   Musculoskeletal: Positive for arthralgias, joint swelling and myalgias. Negative for gait problem and bursitis.   Skin: Positive for skin lesions.   Neurological: Negative for dizziness, facial asymmetry, speech difficulty, weakness, light-headedness, headache, memory problem and confusion.       Vitals:    08/12/22 1344   BP: 109/73   Pulse: 113   Resp: 12   Temp: 98.4 °F (36.9 °C)   SpO2: 97%       Objective   Physical Exam  Vitals and nursing note reviewed.   Constitutional:       General: She is not in acute distress.     Appearance: She is well-developed and normal weight. She is not ill-appearing or toxic-appearing.   HENT:      Head: Normocephalic and atraumatic.   Cardiovascular:      Rate and Rhythm: Normal rate and regular rhythm.      Heart sounds:  Normal heart sounds. No murmur heard.  Pulmonary:      Effort: Pulmonary effort is normal. No respiratory distress.      Breath sounds: Normal breath sounds. No stridor. No wheezing or rhonchi.   Musculoskeletal:      Left elbow: Swelling present.      Cervical back: Normal range of motion and neck supple.      Right lower leg: No edema.      Left lower leg: No edema.      Comments: +Lt mild -mod olecranon bursal swelling w/o TTP   Skin:     General: Skin is warm and dry.      Findings: No rash.          Neurological:      Mental Status: She is alert and oriented to person, place, and time.      Cranial Nerves: No cranial nerve deficit.   Psychiatric:         Attention and Perception: Attention and perception normal.         Mood and Affect: Mood and affect normal.         Speech: Speech normal.         Behavior: Behavior normal.         Thought Content: Thought content normal.         Cognition and Memory: Cognition and memory normal.         Judgment: Judgment normal.           Assessment & Plan   Diagnoses and all orders for this visit:    1. Calcinosis cutis (Primary)  -     Ambulatory Referral to General Surgery    2. Olecranon bursitis of left elbow    3. Arm pain, left  -     Ambulatory Referral to General Surgery    4. Status post revision of total hip replacement  -     XR Hip With or Without Pelvis 2 - 3 View Right; Future    5. Acquired hypothyroidism    6. Panlobular emphysema (HCC)    Other orders  -     Budeson-Glycopyrrol-Formoterol (Breztri Aerosphere) 160-9-4.8 MCG/ACT aerosol inhaler; Inhale 2 puffs 2 (Two) Times a Day.  Dispense: 10.7 g; Refill: 0  -     levothyroxine (SYNTHROID, LEVOTHROID) 100 MCG tablet; Take 1 tablet by mouth Daily.  Dispense: 90 tablet; Refill: 1  -     omeprazole (priLOSEC) 20 MG capsule; Take 1 capsule by mouth Daily.  Dispense: 90 capsule; Refill: 1  -     simvastatin (ZOCOR) 10 MG tablet; Take 1 tablet by mouth Every Night.  Dispense: 90 tablet; Refill: 3  -     vitamin  D3 125 MCG (5000 UT) capsule capsule; Take 1 capsule by mouth Every Other Day. Take 1 tablet on Monday,Wednesday and Friday.  Dispense: 30 capsule; Refill: 0    [employer] appln given to pt and disc'd how to fill out and bring back   Disc'd hx/o Rt hip issues and why second THR needed; check Rt Hip hardware  Breztri samples given today to use when runs out of symbicort  Reviewed labs w/ pt   Refer to Gen Sx for calcinosis cuti lesion removals   disc'd w/ pt that can come back for bursal asp and injection if not done by sx

## 2022-08-29 DIAGNOSIS — M53.3 SACROILIAC JOINT PAIN: ICD-10-CM

## 2022-08-29 RX ORDER — HYDROCODONE BITARTRATE AND ACETAMINOPHEN 7.5; 325 MG/1; MG/1
1 TABLET ORAL EVERY 6 HOURS PRN
Qty: 120 TABLET | Refills: 0 | Status: SHIPPED | OUTPATIENT
Start: 2022-08-29 | End: 2022-10-03 | Stop reason: SDUPTHER

## 2022-08-29 NOTE — TELEPHONE ENCOUNTER
Rx Refill Note  Requested Prescriptions     Pending Prescriptions Disp Refills   • HYDROcodone-acetaminophen (Norco) 7.5-325 MG per tablet 120 tablet 0     Sig: Take 1 tablet by mouth Every 6 (Six) Hours As Needed for Severe Pain .      Last office visit with prescribing clinician: 8/12/2022      Next office visit with prescribing clinician: Visit date not found     Lipid Panel (08/05/2022 08:56)         Radha Obrien CMA  08/29/22, 16:24 EDT

## 2022-08-29 NOTE — TELEPHONE ENCOUNTER
Caller: Eva Borrego    Relationship: Self    Best call back number: [phone]     Requested Prescriptions:   Requested Prescriptions     Pending Prescriptions Disp Refills   • HYDROcodone-acetaminophen (Norco) 7.5-325 MG per tablet 120 tablet 0     Sig: Take 1 tablet by mouth Every 6 (Six) Hours As Needed for Severe Pain .        [facility] where request should be sent: [facility] [address] [place] [address] AT [facility] [address] [place] [address] - [phone] [place] [phone]      Additional details provided by patient: PATIENT HAS ENOUGH FOR TOMORROW     Does the patient have less than a 3 day supply:  [x] Yes  [] No    Bryce Ruelas Rep   08/29/22 15:06 EDT

## 2022-09-12 ENCOUNTER — OFFICE VISIT (OUTPATIENT)
Dept: SURGERY | Facility: CLINIC | Age: 72
End: 2022-09-12

## 2022-09-12 VITALS
HEIGHT: 63 IN | HEART RATE: 111 BPM | OXYGEN SATURATION: 96 % | BODY MASS INDEX: 23.04 KG/M2 | DIASTOLIC BLOOD PRESSURE: 95 MMHG | TEMPERATURE: 98.4 F | SYSTOLIC BLOOD PRESSURE: 152 MMHG | WEIGHT: 130 LBS

## 2022-09-12 DIAGNOSIS — L94.2 CALCINOSIS CUTIS: Primary | ICD-10-CM

## 2022-09-12 PROCEDURE — 99203 OFFICE O/P NEW LOW 30 MIN: CPT | Performed by: SURGERY

## 2022-09-12 NOTE — PROGRESS NOTES
CHIEF COMPLAINT:    Calcium deposition in skin    HISTORY OF PRESENT ILLNESS:    Eva Borrego is a 72 y.o. female who has a history of calcifications and erosion of the skin of the left arm and elbow region previously treated by dermatologist in [place].  The last time she had any treatment for this was several years ago she believes.  Recently she has developed further cutaneous eruptions primarily around the left elbow region.  These cause pain particular with pressure over the region.  Therefore she was sent to me for further discussion of any possible interventions.    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 Authorizing Provider   albuterol sulfate HFA (Ventolin HFA) 108 (90 Base) MCG/ACT inhaler Inhale 2 puffs Every 6 (Six) Hours As Needed for Wheezing or Shortness of Air. 1/21/21  Yes Shannan Larsen, DO   ALLEGRA-D ALLERGY & CONGESTION  MG per 12 hr tablet TAKE ONE TABLET BY MOUTH TWICE A DAY AS NEEDED FOR ALLERGIES  Patient taking differently: Take 1 tablet by mouth Daily. 5/26/20  Yes Shannan Larsen, DO   Budeson-Glycopyrrol-Formoterol (Breztri Aerosphere) 160-9-4.8 MCG/ACT aerosol inhaler Inhale 2 puffs 2 (Two) Times a Day. 8/12/22  Yes Shannan Larsen, DO   denosumab (Prolia) 60  MG/ML solution prefilled syringe syringe Inject 1 mL under the skin into the appropriate area as directed Every 6 (Six) Months. 2/10/22  Yes Shannan Larsen DO   HYDROcodone-acetaminophen (Norco) 7.5-325 MG per tablet Take 1 tablet by mouth Every 6 (Six) Hours As Needed for Severe Pain . 22  Yes Shannan Larsen DO   ipratropium-albuterol (DUO-NEB) 0.5-2.5 mg/3 ml nebulizer USE 1 VIAL PER NEBULIZER EVERY 4 HOURS AS NEEDED FOR WHEEZING 22  Yes Shannan Larsen, DO   levothyroxine (SYNTHROID, LEVOTHROID) 100 MCG tablet Take 1 tablet by mouth Daily. 22 Yes Shannan Larsen DO   multivitamin with minerals tablet tablet Take 1 tablet by mouth Daily.   Yes Provider, MD Val   omeprazole (priLOSEC) 20 MG capsule Take 1 capsule by mouth Daily. 22  Yes Shannan Larsen DO   simvastatin (ZOCOR) 10 MG tablet Take 1 tablet by mouth Every Night. 22 Yes Shannan Larsen DO   vitamin D3 125 MCG (5000 UT) capsule capsule Take 1 capsule by mouth Every Other Day. Take 1 tablet on Monday,Wednesday and Friday. 22  Yes Shannan Larsen DO   budesonide-formoterol (SYMBICORT) 160-4.5 MCG/ACT inhaler INHALE TWO PUFFS BY MOUTH TWICE A DAY 22  Shannan Larsen DO       Allergies   Allergen Reactions   • Erythromycin Anaphylaxis   • Celebrex [Celecoxib] Diarrhea       Family History   Problem Relation Age of Onset   • Diabetes Mother    • Hyperlipidemia Father    • Aortic aneurysm Father    • Arthritis Maternal Grandmother    • Osteoporosis Maternal Grandmother    • Stroke Paternal Grandfather    • Alcohol abuse Sister    • Kidney disease Brother         HD   • COPD Brother    • Hypertension Brother    • Arthritis Brother        Social History     Socioeconomic History   • Marital status:    Tobacco Use   • Smoking status: Former Smoker     Packs/day: 1.00     Years: 40.00     Pack years: 40.00     Types: Cigarettes     Quit date: 10/11/2012     Years since quittin.9  "  • Smokeless tobacco: Current User   • Tobacco comment: Vapes 3mL-8   Vaping Use   • Vaping Use: Never used   Substance and Sexual Activity   • Alcohol use: No   • Drug use: No   • Sexual activity: Defer       Review of Systems   Musculoskeletal: Positive for joint swelling.   Skin:        Eruptions of calcium at left elbow       Objective     /95   Pulse 111   Temp 98.4 °F (36.9 °C) (Infrared)   Ht 160 cm (63\")   Wt 59 kg (130 lb)   SpO2 96%   BMI 23.03 kg/m²     Physical Exam  Constitutional:       General: She is not in acute distress.     Appearance: Normal appearance. She is not ill-appearing, toxic-appearing or diaphoretic.   HENT:      Head: Normocephalic and atraumatic.   Eyes:      General: No scleral icterus.        Right eye: No discharge.         Left eye: No discharge.      Extraocular Movements: Extraocular movements intact.      Conjunctiva/sclera: Conjunctivae normal.   Pulmonary:      Effort: Pulmonary effort is normal. No respiratory distress.   Skin:         Neurological:      General: No focal deficit present.      Mental Status: She is alert and oriented to person, place, and time.   Psychiatric:         Mood and Affect: Mood normal.         Behavior: Behavior normal.         Thought Content: Thought content normal.         Judgment: Judgment normal.           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  Given that she also appears to have a left elbow effusion and possible involvement of the joint I recommended that she see orthopedic surgery.  I suspect that there is not any particular surgical intervention will be of help to her but I will defer to an orthopedic specialist for this.  She can see me as needed.          This document has been electronically signed by Alfredo Cuevas MD on September 12, 2022 10:47 " EDT

## 2022-09-28 ENCOUNTER — OFFICE VISIT (OUTPATIENT)
Dept: ORTHOPEDIC SURGERY | Facility: CLINIC | Age: 72
End: 2022-09-28

## 2022-09-28 VITALS — HEART RATE: 110 BPM | WEIGHT: 130 LBS | HEIGHT: 63 IN | OXYGEN SATURATION: 94 % | BODY MASS INDEX: 23.04 KG/M2

## 2022-09-28 DIAGNOSIS — M25.522 LEFT ELBOW PAIN: Primary | ICD-10-CM

## 2022-09-28 PROCEDURE — 99203 OFFICE O/P NEW LOW 30 MIN: CPT | Performed by: FAMILY MEDICINE

## 2022-09-29 ENCOUNTER — TELEPHONE (OUTPATIENT)
Dept: ORTHOPEDIC SURGERY | Facility: CLINIC | Age: 72
End: 2022-09-29

## 2022-10-03 ENCOUNTER — TELEPHONE (OUTPATIENT)
Dept: FAMILY MEDICINE CLINIC | Facility: CLINIC | Age: 72
End: 2022-10-03

## 2022-10-03 DIAGNOSIS — M53.3 SACROILIAC JOINT PAIN: ICD-10-CM

## 2022-10-03 PROBLEM — M25.522 LEFT ELBOW PAIN: Status: ACTIVE | Noted: 2022-10-03

## 2022-10-03 RX ORDER — HYDROCODONE BITARTRATE AND ACETAMINOPHEN 7.5; 325 MG/1; MG/1
1 TABLET ORAL EVERY 6 HOURS PRN
Qty: 120 TABLET | Refills: 0 | Status: CANCELLED | OUTPATIENT
Start: 2022-10-03

## 2022-10-03 RX ORDER — ACETAMINOPHEN 325 MG/1
1000 TABLET ORAL ONCE
Status: CANCELLED | OUTPATIENT
Start: 2022-10-03 | End: 2022-10-03

## 2022-10-03 RX ORDER — MELOXICAM 7.5 MG/1
15 TABLET ORAL ONCE
Status: CANCELLED | OUTPATIENT
Start: 2022-10-03 | End: 2022-10-03

## 2022-10-03 RX ORDER — PREGABALIN 150 MG/1
150 CAPSULE ORAL ONCE
Status: CANCELLED | OUTPATIENT
Start: 2022-10-03 | End: 2022-10-03

## 2022-10-03 RX ORDER — HYDROCODONE BITARTRATE AND ACETAMINOPHEN 7.5; 325 MG/1; MG/1
1 TABLET ORAL EVERY 6 HOURS PRN
Qty: 120 TABLET | Refills: 0 | Status: SHIPPED | OUTPATIENT
Start: 2022-10-03 | End: 2022-11-10 | Stop reason: SDUPTHER

## 2022-10-03 NOTE — TELEPHONE ENCOUNTER
No.  Not really.  I can get her surgery scheduled for left elbow mass excision.  Please make sure that the patient does actually want to do the surgery.  Please also ask her when she wants to do the surgery.  I can then put the orders in for elbow mass resection thank you   Advancement-Rotation Flap Text: The defect edges were debeveled with a #15c scalpel blade.  Given the location of the defect, shape of the defect and the proximity to free margins an advancement-rotation flap was deemed most appropriate.  Using a sterile surgical marker, an appropriate flap was drawn incorporating the defect and placing the expected incisions within the relaxed skin tension lines where possible. The area thus outlined was incised deep to adipose tissue with a #15c scalpel blade.  The skin margins were undermined to an appropriate distance in all directions utilizing iris scissors.

## 2022-10-03 NOTE — TELEPHONE ENCOUNTER
Incoming Refill Request      Medication requested (name and dose):   HYDROcodone-acetaminophen (Norco) 7.5-325 MG per tablet  1 tablet, Every 6 Hours PRN         Pharmacy where request should be sent: [id number] - [address]  - [phone]  - [phone] FX  [phone]    Additional details provided by patient: PATIENT WILL BE OUT TOMORROW    Best call back number: [phone]    Does the patient have less than a 3 day supply:  [x] Yes  [] No    Bryce Hou Rep  10/03/22, 11:05 EDT

## 2022-10-03 NOTE — TELEPHONE ENCOUNTER
[facility]    Rx Refill Note  Requested Prescriptions     Pending Prescriptions Disp Refills   • HYDROcodone-acetaminophen (Norco) 7.5-325 MG per tablet 120 tablet 0     Sig: Take 1 tablet by mouth Every 6 (Six) Hours As Needed for Severe Pain.      Last office visit with prescribing clinician: 8/12/2022      Next office visit with prescribing clinician: Visit date not found     Lipid Panel (08/05/2022 08:56)         Charisma Villegas, RT  10/03/22, 11:13 EDT

## 2022-10-03 NOTE — TELEPHONE ENCOUNTER
Yes patient wants to proceed with surgery as soon as possible if you can please add case request/orders. Thank you!!

## 2022-10-04 NOTE — TELEPHONE ENCOUNTER
PATIENTS SURGERY SCHEDULED 10/11. YOU PUT IN A SHOULDER XRAYS DOES SHE NEED THAT FOR SURGERY OR CAN WE CANCEL?

## 2022-10-07 ENCOUNTER — HOSPITAL ENCOUNTER (OUTPATIENT)
Dept: GENERAL RADIOLOGY | Facility: HOSPITAL | Age: 72
Discharge: HOME | End: 2022-10-07

## 2022-10-07 ENCOUNTER — LAB (OUTPATIENT)
Dept: LAB | Facility: HOSPITAL | Age: 72
End: 2022-10-07

## 2022-10-07 ENCOUNTER — HOSPITAL ENCOUNTER (OUTPATIENT)
Dept: CARDIOLOGY | Facility: HOSPITAL | Age: 72
Discharge: HOME | End: 2022-10-07

## 2022-10-07 ENCOUNTER — APPOINTMENT (OUTPATIENT)
Dept: LAB | Facility: HOSPITAL | Age: 72
End: 2022-10-07

## 2022-10-07 DIAGNOSIS — M25.522 LEFT ELBOW PAIN: ICD-10-CM

## 2022-10-07 LAB
ABO GROUP BLD: NORMAL
BLD GP AB SCN SERPL QL: NEGATIVE
DEPRECATED RDW RBC AUTO: 41.1 FL (ref 37–54)
ERYTHROCYTE [DISTWIDTH] IN BLOOD BY AUTOMATED COUNT: 12.2 % (ref 12.3–15.4)
HCT VFR BLD AUTO: 42.5 % (ref 34–46.6)
HGB BLD-MCNC: 14 G/DL (ref 12–15.9)
MCH RBC QN AUTO: 30.6 PG (ref 26.6–33)
MCHC RBC AUTO-ENTMCNC: 32.9 G/DL (ref 31.5–35.7)
MCV RBC AUTO: 92.8 FL (ref 79–97)
PLATELET # BLD AUTO: 263 10*3/MM3 (ref 140–450)
PMV BLD AUTO: 10 FL (ref 6–12)
RBC # BLD AUTO: 4.58 10*6/MM3 (ref 3.77–5.28)
RH BLD: POSITIVE
T&S EXPIRATION DATE: NORMAL
WBC NRBC COR # BLD: 7.31 10*3/MM3 (ref 3.4–10.8)

## 2022-10-07 PROCEDURE — 86850 RBC ANTIBODY SCREEN: CPT

## 2022-10-07 PROCEDURE — 36415 COLL VENOUS BLD VENIPUNCTURE: CPT | Performed by: ORTHOPAEDIC SURGERY

## 2022-10-07 PROCEDURE — 93010 ELECTROCARDIOGRAM REPORT: CPT | Performed by: INTERNAL MEDICINE

## 2022-10-07 PROCEDURE — 85027 COMPLETE CBC AUTOMATED: CPT | Performed by: ORTHOPAEDIC SURGERY

## 2022-10-07 PROCEDURE — 86900 BLOOD TYPING SEROLOGIC ABO: CPT

## 2022-10-07 PROCEDURE — 93005 ELECTROCARDIOGRAM TRACING: CPT

## 2022-10-07 PROCEDURE — 71046 X-RAY EXAM CHEST 2 VIEWS: CPT

## 2022-10-07 PROCEDURE — 86901 BLOOD TYPING SEROLOGIC RH(D): CPT

## 2022-10-07 PROCEDURE — 73030 X-RAY EXAM OF SHOULDER: CPT

## 2022-10-09 LAB — QT INTERVAL: 371 MS

## 2022-10-10 ENCOUNTER — ANESTHESIA EVENT (OUTPATIENT)
Dept: PERIOP | Facility: HOSPITAL | Age: 72
End: 2022-10-10

## 2022-10-11 ENCOUNTER — ANESTHESIA (OUTPATIENT)
Dept: PERIOP | Facility: HOSPITAL | Age: 72
End: 2022-10-11

## 2022-10-11 ENCOUNTER — HOSPITAL ENCOUNTER (OUTPATIENT)
Facility: HOSPITAL | Age: 72
Setting detail: HOSPITAL OUTPATIENT SURGERY
Discharge: HOME | End: 2022-10-11
Attending: ORTHOPAEDIC SURGERY | Admitting: ORTHOPAEDIC SURGERY

## 2022-10-11 VITALS
HEART RATE: 92 BPM | OXYGEN SATURATION: 96 % | TEMPERATURE: 97.9 F | DIASTOLIC BLOOD PRESSURE: 72 MMHG | WEIGHT: 128.8 LBS | BODY MASS INDEX: 22.82 KG/M2 | HEIGHT: 63 IN | SYSTOLIC BLOOD PRESSURE: 118 MMHG | RESPIRATION RATE: 16 BRPM

## 2022-10-11 DIAGNOSIS — M25.522 LEFT ELBOW PAIN: Primary | ICD-10-CM

## 2022-10-11 DIAGNOSIS — R22.32 MASS OF LEFT HAND: Primary | ICD-10-CM

## 2022-10-11 DIAGNOSIS — M25.522 LEFT ELBOW PAIN: ICD-10-CM

## 2022-10-11 PROCEDURE — 24120 EXC/CRTG B1 CST/B9 TUM RDS: CPT | Performed by: ORTHOPAEDIC SURGERY

## 2022-10-11 PROCEDURE — 25010000002 DEXAMETHASONE PER 1 MG: Performed by: ANESTHESIOLOGY

## 2022-10-11 PROCEDURE — 25010000002 MIDAZOLAM PER 1 MG: Performed by: ANESTHESIOLOGY

## 2022-10-11 PROCEDURE — 25010000002 FENTANYL CITRATE (PF) 100 MCG/2ML SOLUTION: Performed by: ANESTHESIOLOGY

## 2022-10-11 PROCEDURE — 25010000002 ROPIVACAINE PER 1 MG: Performed by: ANESTHESIOLOGY

## 2022-10-11 PROCEDURE — 88307 TISSUE EXAM BY PATHOLOGIST: CPT | Performed by: ORTHOPAEDIC SURGERY

## 2022-10-11 PROCEDURE — 88304 TISSUE EXAM BY PATHOLOGIST: CPT | Performed by: ORTHOPAEDIC SURGERY

## 2022-10-11 PROCEDURE — S0260 H&P FOR SURGERY: HCPCS | Performed by: ORTHOPAEDIC SURGERY

## 2022-10-11 PROCEDURE — 0 BUPIVACAINE LIPOSOME 1.3 % SUSPENSION: Performed by: ORTHOPAEDIC SURGERY

## 2022-10-11 PROCEDURE — 76942 ECHO GUIDE FOR BIOPSY: CPT | Performed by: ORTHOPAEDIC SURGERY

## 2022-10-11 PROCEDURE — C9290 INJ, BUPIVACAINE LIPOSOME: HCPCS | Performed by: ORTHOPAEDIC SURGERY

## 2022-10-11 PROCEDURE — 25010000002 CEFAZOLIN PER 500 MG: Performed by: ORTHOPAEDIC SURGERY

## 2022-10-11 RX ORDER — NALOXONE HCL 0.4 MG/ML
0.4 VIAL (ML) INJECTION AS NEEDED
Status: DISCONTINUED | OUTPATIENT
Start: 2022-10-11 | End: 2022-10-11 | Stop reason: HOSPADM

## 2022-10-11 RX ORDER — DEXAMETHASONE SODIUM PHOSPHATE 4 MG/ML
INJECTION, SOLUTION INTRA-ARTICULAR; INTRALESIONAL; INTRAMUSCULAR; INTRAVENOUS; SOFT TISSUE
Status: COMPLETED | OUTPATIENT
Start: 2022-10-11 | End: 2022-10-11

## 2022-10-11 RX ORDER — ACETAMINOPHEN 650 MG/1
650 SUPPOSITORY RECTAL ONCE AS NEEDED
Status: DISCONTINUED | OUTPATIENT
Start: 2022-10-11 | End: 2022-10-11 | Stop reason: HOSPADM

## 2022-10-11 RX ORDER — FENTANYL CITRATE 50 UG/ML
25 INJECTION, SOLUTION INTRAMUSCULAR; INTRAVENOUS
Status: DISCONTINUED | OUTPATIENT
Start: 2022-10-11 | End: 2022-10-11 | Stop reason: HOSPADM

## 2022-10-11 RX ORDER — ONDANSETRON 4 MG/1
4 TABLET, FILM COATED ORAL EVERY 6 HOURS PRN
Qty: 30 TABLET | Refills: 0 | Status: SHIPPED | OUTPATIENT
Start: 2022-10-11 | End: 2023-01-05

## 2022-10-11 RX ORDER — SODIUM CHLORIDE 0.9 % (FLUSH) 0.9 %
10 SYRINGE (ML) INJECTION EVERY 12 HOURS SCHEDULED
Status: DISCONTINUED | OUTPATIENT
Start: 2022-10-11 | End: 2022-10-11 | Stop reason: HOSPADM

## 2022-10-11 RX ORDER — ONDANSETRON 2 MG/ML
4 INJECTION INTRAMUSCULAR; INTRAVENOUS ONCE AS NEEDED
Status: DISCONTINUED | OUTPATIENT
Start: 2022-10-11 | End: 2022-10-11 | Stop reason: HOSPADM

## 2022-10-11 RX ORDER — MIDAZOLAM HYDROCHLORIDE 1 MG/ML
INJECTION INTRAMUSCULAR; INTRAVENOUS AS NEEDED
Status: DISCONTINUED | OUTPATIENT
Start: 2022-10-11 | End: 2022-10-11 | Stop reason: SURG

## 2022-10-11 RX ORDER — FENTANYL CITRATE 50 UG/ML
INJECTION, SOLUTION INTRAMUSCULAR; INTRAVENOUS AS NEEDED
Status: DISCONTINUED | OUTPATIENT
Start: 2022-10-11 | End: 2022-10-11 | Stop reason: SURG

## 2022-10-11 RX ORDER — BUPIVACAINE HYDROCHLORIDE AND EPINEPHRINE 5; 5 MG/ML; UG/ML
INJECTION, SOLUTION EPIDURAL; INTRACAUDAL; PERINEURAL AS NEEDED
Status: DISCONTINUED | OUTPATIENT
Start: 2022-10-11 | End: 2022-10-11 | Stop reason: HOSPADM

## 2022-10-11 RX ORDER — ROPIVACAINE HYDROCHLORIDE 5 MG/ML
INJECTION, SOLUTION EPIDURAL; INFILTRATION; PERINEURAL
Status: COMPLETED | OUTPATIENT
Start: 2022-10-11 | End: 2022-10-11

## 2022-10-11 RX ORDER — LABETALOL HYDROCHLORIDE 5 MG/ML
5 INJECTION, SOLUTION INTRAVENOUS
Status: DISCONTINUED | OUTPATIENT
Start: 2022-10-11 | End: 2022-10-11 | Stop reason: HOSPADM

## 2022-10-11 RX ORDER — OXYCODONE HYDROCHLORIDE 5 MG/1
TABLET ORAL AS NEEDED
Status: DISCONTINUED | OUTPATIENT
Start: 2022-10-11 | End: 2022-10-11 | Stop reason: SURG

## 2022-10-11 RX ORDER — ASPIRIN 81 MG/1
325 TABLET ORAL DAILY
Qty: 30 TABLET | Refills: 0 | Status: SHIPPED | OUTPATIENT
Start: 2022-10-11 | End: 2023-01-05

## 2022-10-11 RX ORDER — PROCHLORPERAZINE EDISYLATE 5 MG/ML
10 INJECTION INTRAMUSCULAR; INTRAVENOUS ONCE AS NEEDED
Status: DISCONTINUED | OUTPATIENT
Start: 2022-10-11 | End: 2022-10-11 | Stop reason: HOSPADM

## 2022-10-11 RX ORDER — OXYCODONE HYDROCHLORIDE AND ACETAMINOPHEN 5; 325 MG/1; MG/1
TABLET ORAL
Qty: 40 TABLET | Refills: 0 | Status: SHIPPED | OUTPATIENT
Start: 2022-10-11 | End: 2023-01-05

## 2022-10-11 RX ORDER — SODIUM CHLORIDE 0.9 % (FLUSH) 0.9 %
10 SYRINGE (ML) INJECTION AS NEEDED
Status: DISCONTINUED | OUTPATIENT
Start: 2022-10-11 | End: 2022-10-11 | Stop reason: HOSPADM

## 2022-10-11 RX ORDER — HYDROCODONE BITARTRATE AND ACETAMINOPHEN 7.5; 325 MG/1; MG/1
2 TABLET ORAL EVERY 4 HOURS PRN
Status: DISCONTINUED | OUTPATIENT
Start: 2022-10-11 | End: 2022-10-11 | Stop reason: HOSPADM

## 2022-10-11 RX ORDER — ACETAMINOPHEN 500 MG
TABLET ORAL AS NEEDED
Status: DISCONTINUED | OUTPATIENT
Start: 2022-10-11 | End: 2022-10-11 | Stop reason: SURG

## 2022-10-11 RX ORDER — ACETAMINOPHEN 500 MG
1000 TABLET ORAL ONCE
Status: DISCONTINUED | OUTPATIENT
Start: 2022-10-11 | End: 2022-10-11

## 2022-10-11 RX ORDER — PREGABALIN 75 MG/1
150 CAPSULE ORAL ONCE
Status: DISCONTINUED | OUTPATIENT
Start: 2022-10-11 | End: 2022-10-11

## 2022-10-11 RX ORDER — DOCUSATE SODIUM 100 MG/1
100 CAPSULE, LIQUID FILLED ORAL 2 TIMES DAILY
Qty: 60 CAPSULE | Refills: 0 | Status: SHIPPED | OUTPATIENT
Start: 2022-10-11 | End: 2023-01-05

## 2022-10-11 RX ORDER — SODIUM CHLORIDE, SODIUM LACTATE, POTASSIUM CHLORIDE, CALCIUM CHLORIDE 600; 310; 30; 20 MG/100ML; MG/100ML; MG/100ML; MG/100ML
9 INJECTION, SOLUTION INTRAVENOUS CONTINUOUS PRN
Status: DISCONTINUED | OUTPATIENT
Start: 2022-10-11 | End: 2022-10-11 | Stop reason: HOSPADM

## 2022-10-11 RX ORDER — ACETAMINOPHEN 325 MG/1
650 TABLET ORAL ONCE AS NEEDED
Status: DISCONTINUED | OUTPATIENT
Start: 2022-10-11 | End: 2022-10-11 | Stop reason: HOSPADM

## 2022-10-11 RX ORDER — DIPHENHYDRAMINE HYDROCHLORIDE 50 MG/ML
12.5 INJECTION INTRAMUSCULAR; INTRAVENOUS
Status: DISCONTINUED | OUTPATIENT
Start: 2022-10-11 | End: 2022-10-11 | Stop reason: HOSPADM

## 2022-10-11 RX ORDER — ALBUTEROL SULFATE 2.5 MG/3ML
2.5 SOLUTION RESPIRATORY (INHALATION) ONCE AS NEEDED
Status: DISCONTINUED | OUTPATIENT
Start: 2022-10-11 | End: 2022-10-11 | Stop reason: HOSPADM

## 2022-10-11 RX ORDER — MELOXICAM 15 MG/1
15 TABLET ORAL ONCE
Status: DISCONTINUED | OUTPATIENT
Start: 2022-10-11 | End: 2022-10-11

## 2022-10-11 RX ORDER — OXYCODONE HYDROCHLORIDE AND ACETAMINOPHEN 5; 325 MG/1; MG/1
1-2 TABLET ORAL EVERY 4 HOURS PRN
Qty: 40 TABLET | Refills: 0 | Status: SHIPPED | OUTPATIENT
Start: 2022-10-11 | End: 2023-01-05

## 2022-10-11 RX ADMIN — OXYCODONE 10 MG: 5 TABLET ORAL at 14:11

## 2022-10-11 RX ADMIN — DEXAMETHASONE SODIUM PHOSPHATE 4 MG: 4 INJECTION, SOLUTION INTRAMUSCULAR; INTRAVENOUS at 14:16

## 2022-10-11 RX ADMIN — ROPIVACAINE HYDROCHLORIDE 30 ML: 5 INJECTION EPIDURAL; INFILTRATION; PERINEURAL at 14:16

## 2022-10-11 RX ADMIN — CEFAZOLIN 2 G: 2 INJECTION, POWDER, FOR SOLUTION INTRAMUSCULAR; INTRAVENOUS at 14:55

## 2022-10-11 RX ADMIN — MIDAZOLAM 2 MG: 1 INJECTION INTRAMUSCULAR; INTRAVENOUS at 14:12

## 2022-10-11 RX ADMIN — FENTANYL CITRATE 100 MCG: 50 INJECTION, SOLUTION INTRAMUSCULAR; INTRAVENOUS at 15:50

## 2022-10-11 RX ADMIN — ACETAMINOPHEN 1000 MG: 500 TABLET, FILM COATED ORAL at 14:11

## 2022-10-11 NOTE — ANESTHESIA PROCEDURE NOTES
Airway    General Information and Staff    Patient location during procedure: OR  CRNA/CAA: Johnathon Ibanez CRNA    Indications and Patient Condition  Indications for airway management: airway protection    Preoxygenated: yes  Mask difficulty assessment: 0 - not attempted    Final Airway Details  Final airway type: supraglottic airway      Successful airway: LMA     Number of attempts at approach: 1  Assessment: lips, teeth, and gum same as pre-op and atraumatic intubation

## 2022-10-11 NOTE — H&P
History & Physical       Patient: Eva Borrego    Date of Admission: 10/11/2022 10:25 AM    YOB: 1950    Medical Record Number: [id number]    Attending Physician: Jeremy Trinh MD        Chief Complaints: Left elbow pain [M25.522]      History of Present Illness: 72 y.o. female presents with Left elbow pain [M25.522]. Onset of symptoms was gradual and slowly progressive over the past 4 to 5 years.  Symptoms are associated with increasing bony mass on the dorsal aspect of the elbow with extension down the shaft of the subcutaneous border of the ulna.  Symptoms are aggravated by flexion and extension of the elbow.   Symptoms improve with resting the arm by the side. Patient is now being admitted to the services of Jeremy Trinh MD for further evaluation and treatment.        Allergies   Allergen Reactions   • Erythromycin Anaphylaxis   • Celebrex [Celecoxib] Diarrhea         Home Medications:  Medications Prior to Admission   Medication Sig Dispense Refill Last Dose   • ALLEGRA-D ALLERGY & CONGESTION  MG per 12 hr tablet TAKE ONE TABLET BY MOUTH TWICE A DAY AS NEEDED FOR ALLERGIES (Patient taking differently: Take 1 tablet by mouth Daily.) 60 tablet 0 10/11/2022 at 0800   • Budeson-Glycopyrrol-Formoterol (Breztri Aerosphere) 160-9-4.8 MCG/ACT aerosol inhaler Inhale 2 puffs 2 (Two) Times a Day. 10.7 g 0 10/11/2022 at 0800   • HYDROcodone-acetaminophen (Norco) 7.5-325 MG per tablet Take 1 tablet by mouth Every 6 (Six) Hours As Needed for Severe Pain. 120 tablet 0 10/11/2022 at 0800   • ipratropium-albuterol (DUO-NEB) 0.5-2.5 mg/3 ml nebulizer USE 1 VIAL PER NEBULIZER EVERY 4 HOURS AS NEEDED FOR WHEEZING 360 mL 0 10/11/2022 at 0800   • levothyroxine (SYNTHROID, LEVOTHROID) 100 MCG tablet Take 1 tablet by mouth Daily. 90 tablet 1 10/11/2022 at 0800   • multivitamin with minerals tablet tablet Take 1 tablet by mouth Daily.   Past Week   • omeprazole (priLOSEC) 20 MG capsule Take 1 capsule by  mouth Daily. 90 capsule 1 10/11/2022 at 0800   • simvastatin (ZOCOR) 10 MG tablet Take 1 tablet by mouth Every Night. 90 tablet 3 10/10/2022 at 2100   • vitamin D3 125 MCG (5000 UT) capsule capsule Take 1 capsule by mouth Every Other Day. Take 1 tablet on Monday,Wednesday and Friday. 30 capsule 0 Past Week   • albuterol sulfate HFA (Ventolin HFA) 108 (90 Base) MCG/ACT inhaler Inhale 2 puffs Every 6 (Six) Hours As Needed for Wheezing or Shortness of Air. 18 g 2 More than a month   • denosumab (Prolia) 60 MG/ML solution prefilled syringe syringe Inject 1 mL under the skin into the appropriate area as directed Every 6 (Six) Months. (Patient taking differently: Inject 1 mL under the skin into the appropriate area as directed Every 6 (Six) Months. Next dose is due in November) 180 mL 1 More than a month       Current Medications:  Scheduled Meds:bupivacaine liposome, 20 mL, Injection, Once  ceFAZolin, 2 g, Intravenous, Once  sodium chloride, 10 mL, Intravenous, Q12H      Continuous Infusions:lactated ringers, 9 mL/hr      PRN Meds:.•  acetaminophen **OR** acetaminophen  •  albuterol  •  diphenhydrAMINE  •  fentanyl  •  HYDROcodone-acetaminophen  •  HYDROmorphone  •  labetalol  •  lactated ringers  •  naloxone  •  ondansetron  •  prochlorperazine  •  sodium chloride       Past Medical History:   Diagnosis Date   • Allergic    • Arthritis    • Atypical mole     removed as a child and recieved radiation, on upper lip   • 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   • CHOL    • COPD    • DEXA     2017 =( -0.1/ -3.0); 2019 =(0.7/ -2.9); 2020 = (0.9/ -2.7)   • GERD    • History of gastric ulcer    • History of transfusion    • Hypothyroidism    • MAMMO     NEG = 2018/ 2019/ 2020/ 2022   • Osteoarthritis    • 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   • Post-menopausal    • Sacroiliac joint pain    • Seasonal allergies    • Telangiectasia         Past Surgical History:   Procedure Laterality Date   •  CHOLECYSTECTOMY     • COLONOSCOPY      TA = 2018/ 2021, rech 2024             GSI   • EYE SURGERY      B/L Cataract/ Lens Lt   • JOINT REPLACEMENT Right     THR---2002/ 2017   • SPINE SURGERY      Discectomy Lumbar   • TONSILLECTOMY          Social History     Occupational History   • Not on file   Tobacco Use   • Smoking status: Former     Years: 40.00     Types: Cigarettes     Quit date: 10/11/2012     Years since quitting: 10.0   • Smokeless tobacco: Current   • Tobacco comments:     Vapes 3mL-8   Vaping Use   • Vaping Use: Every day   • Substances: Flavoring, menthol 12 mg   • Devices: [employer] tank   Substance and Sexual Activity   • Alcohol use: No   • Drug use: No   • Sexual activity: Defer      Social History     Social History Narrative   • Not on file        Family History   Problem Relation Age of Onset   • Diabetes Mother    • Hyperlipidemia Father    • Aortic aneurysm Father    • Arthritis Maternal Grandmother    • Osteoporosis Maternal Grandmother    • Stroke Paternal Grandfather    • Alcohol abuse Sister    • Kidney disease Brother         HD   • COPD Brother    • Hypertension Brother    • Arthritis Brother          Review of Systems:   HEENT: Patient denies any headaches, vision changes, change in hearing, or tinnitus, Patient denies any rhinorrhea,epistaxis, sinus pain, mouth or dental problems, sore throat or hoarseness, or dysphagia  Pulmonary: Patient denies any cough, congestion, SOA, or wheezing  Cardiovascular: Patient denies any chest pain, dyspnea, palpitations, weakness, intolerance of exercise, varicosities, swelling of extremities, known murmur  Gastrointestinal:  Patient denies nausea, vomiting, diarrhea, constipation, loss  of appetite, change in appetite, dysphagia, gas, heartburn, melena, change in bowel habits, use of laxatives or other drugs to alter the function of the gastrointestinal tract.  Genital/Urinary: Patient denies dysuria, change in color of urine, change in frequency  "of urination, pain with urgency, incontinence, retention, or nocturia.  Musculoskeletal: Patient denies increased warmth; redness; or swelling of joints; limitation of function; deformity; crepitation: pain in a joint or an extremity, the neck, or the back, especially with movement.  Neurological: Patient denies dizziness, tremor, ataxia, difficulty in speaking, change in speech, paresthesia, loss of sensation, seizures, syncope, changes in memory.  Endocrine system: Patient denies tremors, palpitations, intolerance of heat or cold, polyuria, polydipsia, polyphagia, diaphoresis, exophthalmos, or goiter.  Psychological: Patient denies thoughts/plans or harming self or other; depression,  insomnia, night terrors, melissa, memory loss, disorientation.  Skin: Patient denies any bruising, rashes, discoloration, pruritus, wounds, ulcers, decubiti, changes in the hair or nails  Hematopoietic: Patient denies history of spontaneous or excessive bleeding, epistaxis, hematuria, melena, fatigue, enlarged or tender lymph nodes, pallor, history of anemia.    Physical Exam: 72 y.o. female  Vitals:    10/05/22 1034 10/11/22 1100 10/11/22 1414 10/11/22 1441   BP:  105/54 119/65 127/78   BP Location:  Left arm     Patient Position:  Lying     Pulse:  120 97 94   Resp:   18 13   Temp:  98.3 °F (36.8 °C)     TempSrc:  Temporal     SpO2:  95% 97% 99%   Weight: 58.1 kg (128 lb) 58.4 kg (128 lb 12.8 oz)     Height: 160 cm (63\") 160 cm (63\")         General Appearance:          Alert, cooperative, in no acute distress                                                 Head:    Normocephalic, without obvious abnormality, atraumatic   Eyes:            Lids and lashes normal, conjunctivae and sclerae normal, no   icterus, no pallor, corneas clear, PERRLA   Ears:    Ears appear intact with no abnormalities noted   Throat:   No oral lesions, no thrush, oral mucosa moist   Neck:   No adenopathy, supple, trachea midline, no thyromegaly, no   carotid " bruit, no JVD   Back:     No kyphosis present, no scoliosis present, no skin lesions,      erythema or scars, no tenderness to percussion or                   palpation,   range of motion normal   Lungs:     Clear to auscultation,respirations regular, even and                  unlabored    Heart:    Regular rhythm and normal rate, normal S1 and S2, no            murmur, no gallop, no rub, no click   Chest Wall:    No abnormalities observed   Abdomen:     Normal bowel sounds, no masses, no organomegaly, soft        nontender, nondistended, no guarding, no rebound                tenderness   Rectal:     Deferred   Extremities:   Tenderness over dorsal aspect of the left ulna. Moves all extremities well, no edema,   no cyanosis, no redness   Pulses:   Pulses palpable and equal bilaterally   Skin:   No bleeding, bruising or rash   Lymph nodes:   No palpable adenopathy   Neurologic:   Cranial nerves 2 - 12 grossly intact, sensation intact, DTR       present and equal bilaterally           Diagnostic Tests:  Admission on 10/11/2022   Component Date Value Ref Range Status   • WBC 10/07/2022 7.31  3.40 - 10.80 10*3/mm3 Final   • RBC 10/07/2022 4.58  3.77 - 5.28 10*6/mm3 Final   • Hemoglobin 10/07/2022 14.0  12.0 - 15.9 g/dL Final   • Hematocrit 10/07/2022 42.5  34.0 - 46.6 % Final   • MCV 10/07/2022 92.8  79.0 - 97.0 fL Final   • MCH 10/07/2022 30.6  26.6 - 33.0 pg Final   • MCHC 10/07/2022 32.9  31.5 - 35.7 g/dL Final   • RDW 10/07/2022 12.2 (L)  12.3 - 15.4 % Final   • RDW-SD 10/07/2022 41.1  37.0 - 54.0 fl Final   • MPV 10/07/2022 10.0  6.0 - 12.0 fL Final   • Platelets 10/07/2022 263  140 - 450 10*3/mm3 Final   • QT Interval 10/07/2022 371  ms Final     No results found.      Assessment:  Patient Active Problem List   Diagnosis   • Calcinosis cutis   • COPD (chronic obstructive pulmonary disease) ([facility])   • Thumb pain   • Osteoporosis without current pathological fracture   • Pain due to hip joint prosthesis (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   This is a very unusual circumstance.  We have discussed extensively with the patient and her son.  I cannot give them any guarantees about soft tissue involvement and possible recurrence of the mass.  We also cannot assure them if the entirety of the mass can be removed because of his proximity to the ulnar nerve and the neurovascular bundle.  We will do our very best clinically in this unusual situation and our goal is to provide symptomatic relief to the patient and to minimize the possibility of abrading the skin and breaking down in developing a soft tissue infection.    Discharge Plan: today to home      Date: 10/11/2022    Jeremy Trinh MD      DICTATED UTILIZING DRAGON DICTATION

## 2022-10-11 NOTE — ANESTHESIA PROCEDURE NOTES
Peripheral Block    Pre-sedation assessment completed: 10/11/2022 2:00 PM    Patient reassessed immediately prior to procedure    Patient location during procedure: pre-op  Reason for block: procedure for pain, at surgeon's request, post-op pain management and secondary anesthetic  Performed by  Anesthesiologist: Dimitris Alexander MD  Preanesthetic Checklist  Completed: patient identified, IV checked, site marked, risks and benefits discussed, surgical consent, monitors and equipment checked, pre-op evaluation and timeout performed  Prep:  Pt Position: sitting  Sterile barriers:cap, gloves, mask and washed/disinfected hands  Prep: ChloraPrep  Patient monitoring: blood pressure monitoring, continuous pulse oximetry and EKG  Procedure    Sedation: yes  Performed under: local infiltration  Guidance:ultrasound guided and landmark technique    ULTRASOUND INTERPRETATION.  Using ultrasound guidance a 22 G gauge needle was placed in close proximity to the brachial plexus nerve, at which point, under ultrasound guidance anesthetic was injected in the area of the nerve and spread of the anesthesia was seen on ultrasound in close proximity thereto.  There were no abnormalities seen on ultrasound; a digital image was taken; and the patient tolerated the procedure with no complications. Images:still images obtained, printed/placed on chart    Laterality:left  Block Type:supraclavicular  Injection Technique:single-shot  Needle Type:echogenic  Needle Gauge:22 G  Resistance on Injection: less than 15 psi    Medications Used: dexamethasone (DECADRON) injection - Injection   4 mg - 10/11/2022 2:16:00 PM  ropivacaine (NAROPIN) 0.5 % injection - Injection   30 mL - 10/11/2022 2:16:00 PM      Post Assessment  Injection Assessment: negative aspiration for heme, no paresthesia on injection and incremental injection  Patient Tolerance:comfortable throughout block  Complications:no  Additional Notes  Pre-procedure:  Peripheral nerve  block performed preoperatively prior to the start of anesthesia time at the request of the patient and the surgeon for the management of postoperative acute surgical pain as well as for a secondary intraoperative anesthetic (general anesthesia is the primary intraoperative anesthetic); patient identified; pre-procedure vital signs, all relevant labs/studies, complete medical/surgical/anesthetic history, full medication list, full allergy list, and NPO status obtained/reviewed; physical assessment performed; anesthetic options, side effects, potential complications, risks, and benefits discussed; questions answered; patient wishes to proceed with the procedure; written anesthesia procedure consent obtained; patient cleared for procedure; time out performed; IV access in situ    Procedure:  ASA monitor placed; supplemental oxygen provided; patient positioned; hand hygiene performed; sterile technique maintained throughout the procedure; sterile prep applied; insertion site determined by anatomical landmarks, palpation, and ultrasound imaging; live ultrasound guidance throughout the procedure; target nerves/landmarks identified on live ultrasound; skin and subcutaneous tissues numbed by injection of 1% lidocaine; 2 inch 22G Toplist Ultra 360 Insulated Echogenic Needle used; realtime needle advancement and placement near the target nerves witnessed on live ultrasound; negative aspiration prior to injection; correct needle placement confirmed on live ultrasound by local anesthetic spread around the target nerves; local anesthetic mixture injected with negative aspiration prior to each injection and after each 1-5 mL injected; needle withdrawn; dressing applied; ultrasound image printed and placed in the patient's permanent medical record    Post-procedure:  Peripheral nerve block placed successfully; good block; no apparent complications; minimal estimated blood loss; vital signs stable throughout; see nurse's notes  for vitals; transported to the OR, general anesthesia induced, and surgery started

## 2022-10-11 NOTE — ANESTHESIA POSTPROCEDURE EVALUATION
Patient: Eva Borrego    Procedure Summary     Date: 10/11/22 Room / Location: [facility] OR 11 / [facility] MAIN OR    Anesthesia Start: 1449 Anesthesia Stop: 1625    Procedure: EXCISION MASS UPPER EXTREMITY, ELBOW (Left) Diagnosis:       Left elbow pain      (Left elbow pain [M25.522])    Surgeons: Jeremy Trinh MD Provider: Seb Kan MD    Anesthesia Type: general with block ASA Status: 3          Anesthesia Type: general with block    Vitals  Vitals Value Taken Time   /68 10/11/22 1640   Temp 97.6 °F (36.4 °C) 10/11/22 1625   Pulse 93 10/11/22 1642   Resp 15 10/11/22 1640   SpO2 93 % 10/11/22 1642   Vitals shown include unvalidated device data.        Post Anesthesia Care and Evaluation    Patient location during evaluation: bedside  Patient participation: complete - patient participated  Level of consciousness: awake and alert  Pain score: 1  Pain management: adequate    Airway patency: patent  Anesthetic complications: No anesthetic complications  PONV Status: none  Cardiovascular status: acceptable  Respiratory status: acceptable  Hydration status: acceptable  Post Neuraxial Block status: Motor and sensory function returned to baseline

## 2022-10-11 NOTE — ANESTHESIA PREPROCEDURE EVALUATION
Anesthesia Evaluation     Patient summary reviewed and Nursing notes reviewed   no history of anesthetic complications:  NPO Solid Status: > 8 hours  NPO Liquid Status: > 8 hours           Airway   Dental      Pulmonary    (+) a smoker Former, COPD,   Cardiovascular     ECG reviewed    (+) dysrhythmias PAC, CHF , hyperlipidemia,       Neuro/Psych  (+) dizziness/light headedness,    GI/Hepatic/Renal/Endo    (+)  GERD,  thyroid problem hypothyroidism    Musculoskeletal     (+) chronic pain,   Abdominal    Substance History      OB/GYN          Other   arthritis,      ROS/Med Hx Other: Smokeless tobacco abuse, allergies, thumb pain, osteoporosis, SI joint pain, vertigo, elbow pain,hand mass, telangiectasia    PSH  CHOLECYSTECTOMY SPINE SURGERY  TONSILLECTOMY EYE SURGERY  JOINT REPLACEMENT COLONOSCOPY                  Anesthesia Plan    ASA 3     general with block     (Patient identified; pre-operative vital signs, all relevant labs/studies, complete medical/surgical/anesthetic history, full medication list, full allergy list, and NPO status obtained/reviewed; physical assessment performed; anesthetic options, side effects, potential complications, risks, and benefits discussed; questions answered; written anesthesia consent obtained; patient cleared for procedure; anesthesia machine and equipment checked and functioning)  intravenous induction     Anesthetic plan, risks, benefits, and alternatives have been provided, discussed and informed consent has been obtained with: patient.    Plan discussed with CRNA and CAA.        CODE STATUS:

## 2022-10-12 NOTE — DISCHARGE SUMMARY
Orthopedic Discharge Summary      Patient: Eva Borrego      YOB: 1950    Medical Record Number: [id number]    Attending Physician: Jeremy Trinh MD orthopedic surgery  Consulting Physician(s):   Date of Admission: 10/11/2022 10:25 AM  Date of Discharge: 10/11/2022  The patient had a very large amount of heterotopic bone on the dorsal aspect of the electron process and the ulna.  This was resected in the operating room.  She has done very well postoperatively and was released from the PACU phase 2 to go home with an intact dressing.  Her pain was well controlled with the scalene block.  We will see her back in the office in a week for incision check.    I have discussed with the patient and her family that the size of the mass resected was 10 cm x 4 cm.    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 ([facility])    Sacroiliac joint pain    Status post revision of total hip replacement    Telangiectasia    Vertigo    Chronic combined systolic (congestive) and diastolic (congestive) heart failure ([facility])    Mass of hand    Hypothyroidism    Senile osteoporosis    Left elbow pain     EXCISION MASS UPPER EXTREMITY, ELBOW       Allergies   Allergen Reactions    Erythromycin Anaphylaxis    Celebrex [Celecoxib] Diarrhea       Current Medications:     Discharge Medications        New Medications        Instructions Start Date   aspirin 81 MG EC tablet   325 mg, Oral, Daily      docusate sodium 100 MG capsule  Commonly known as: COLACE   100 mg, Oral, 2 Times Daily      ondansetron 4 MG tablet  Commonly known as: Zofran   4 mg, Oral, Every 6 Hours PRN      oxyCODONE-acetaminophen 5-325 MG per tablet  Commonly known as: PERCOCET   Take one tablet by mouth every 6 hours as  needed for pain      oxyCODONE-acetaminophen 5-325 MG per tablet  Commonly known as: PERCOCET   1-2 tablets, Oral, Every 4 Hours PRN             Changes to Medications        Instructions Start Date   Allegra-D Allergy & Congestion  MG per 12 hr tablet  Generic drug: fexofenadine-pseudoephedrine  What changed: See the new instructions.   TAKE ONE TABLET BY MOUTH TWICE A DAY AS NEEDED FOR ALLERGIES      Prolia 60 MG/ML solution prefilled syringe syringe  Generic drug: denosumab  What changed: additional instructions   60 mg, Subcutaneous, Every 6 Months             Continue These Medications        Instructions Start Date   albuterol sulfate  (90 Base) MCG/ACT inhaler  Commonly known as: Ventolin HFA   2 puffs, Inhalation, Every 6 Hours PRN      Breztri Aerosphere 160-9-4.8 MCG/ACT aerosol inhaler  Generic drug: Budeson-Glycopyrrol-Formoterol   2 puffs, Inhalation, 2 Times Daily      HYDROcodone-acetaminophen 7.5-325 MG per tablet  Commonly known as: Norco   1 tablet, Oral, Every 6 Hours PRN      ipratropium-albuterol 0.5-2.5 mg/3 ml nebulizer  Commonly known as: DUO-NEB   USE 1 VIAL PER NEBULIZER EVERY 4 HOURS AS NEEDED FOR WHEEZING      levothyroxine 100 MCG tablet  Commonly known as: SYNTHROID, LEVOTHROID   100 mcg, Oral, Daily      multivitamin with minerals tablet tablet   1 tablet, Oral, Daily      omeprazole 20 MG capsule  Commonly known as: priLOSEC   20 mg, Oral, Daily      simvastatin 10 MG tablet  Commonly known as: ZOCOR   10 mg, Oral, Nightly      vitamin D3 125 MCG (5000 UT) capsule capsule   5,000 Units, Oral, Every Other Day, Take 1 tablet on Monday,Wednesday and Friday.                    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    Post-menopausal     Sacroiliac joint pain     Seasonal allergies     Telangiectasia         Past Surgical History:   Procedure Laterality Date    CHOLECYSTECTOMY      COLONOSCOPY      TA = 2018/ 2021, rech 2024             GSI    EYE SURGERY      B/L Cataract/ Lens Lt    JOINT REPLACEMENT Right     THR---2002/ 2017    SPINE SURGERY      Discectomy Lumbar    TONSILLECTOMY          Social History     Occupational History    Not on file   Tobacco Use    Smoking status: Former     Years: 40.00     Types: Cigarettes     Quit date: 10/11/2012     Years since quitting: 10.0    Smokeless tobacco: Current    Tobacco comments:     Vapes 3mL-8   Vaping Use    Vaping Use: Every day    Substances: Flavoring, menthol 12 mg    Devices: GameOnble tank   Substance and Sexual Activity    Alcohol use: No    Drug use: No    Sexual activity: Defer      Social History     Social History Narrative    Not on file        Family History   Problem Relation Age of Onset    Diabetes Mother     Hyperlipidemia Father     Aortic aneurysm Father     Arthritis Maternal Grandmother     Osteoporosis Maternal Grandmother     Stroke Paternal Grandfather     Alcohol abuse Sister     Kidney disease Brother         HD    COPD Brother     Hypertension Brother     Arthritis Brother              Hospital Course:  72 y.o. female admitted to [facility] to services of No att. providers found with Left elbow pain [M25.522] on 10/11/2022 and underwent EXCISION MASS UPPER EXTREMITY, ELBOW Per No att. providers found. Antibiotic and VTE prophylaxis were per SCIP protocols. Post-operatively the patient transferred to the post-operative floor where the patient underwent mobilization therapy that included active as well as passive ROM exercises. Opioids were titrated to  achieve appropriate pain management to allow for participation in mobilization exercises. Vital signs are now stable. The incision is intact without signs or symptoms of infection. Operative extremity neurovascular status remains intact.   Appropriate education re: incision care, activity levels, medications, and follow up visits was completed and all questions were answered. The patient is now deemed stable for discharge from hospital.      DIAGNOSTIC TESTS:   Admission on 10/11/2022, Discharged on 10/11/2022   Component Date Value Ref Range Status    WBC 10/07/2022 7.31  3.40 - 10.80 10*3/mm3 Final    RBC 10/07/2022 4.58  3.77 - 5.28 10*6/mm3 Final    Hemoglobin 10/07/2022 14.0  12.0 - 15.9 g/dL Final    Hematocrit 10/07/2022 42.5  34.0 - 46.6 % Final    MCV 10/07/2022 92.8  79.0 - 97.0 fL Final    MCH 10/07/2022 30.6  26.6 - 33.0 pg Final    MCHC 10/07/2022 32.9  31.5 - 35.7 g/dL Final    RDW 10/07/2022 12.2 (L)  12.3 - 15.4 % Final    RDW-SD 10/07/2022 41.1  37.0 - 54.0 fl Final    MPV 10/07/2022 10.0  6.0 - 12.0 fL Final    Platelets 10/07/2022 263  140 - 450 10*3/mm3 Final    QT Interval 10/07/2022 371  ms Final     No results found.    Discharge and Follow up Instructions:   Discharge Instructions:       1. Patient is to continue with physical therapy exercises BID and continue working with        the physical therapist as ordered.  2.  Continue to follow precautions as instructed.   3.  Patient may weight bear as tolerated.   4.  Continue JULIEN hose daily and ice regularly. Patient instructed on frequent calf                  pumping exercises.  May remove compression stockings at night.  5.  Patient also instructed on incentive spirometer during hospitalization and                          encouraged to continue to use at home regularly.   6.  Patient is instructed to continue DVT prophylaxis.  7.  The dressing should be left in place. If waterproof dressing is intact the patient may         shower  immediately following discharge. If the dressing becomes disloged or                   saturated it should be changed and patient must wait until POD #5 to shower. If               dressing is changed, apply dry sterile dressing after showering.  8.  Follow up appointment in 2 weeks - patient to call the office at [phone] to schedule.       Date: 10/12/2022    Jeremy Trinh MD      Time: Discharge 10 min     DICTATED UTILIZING DRAGON DICTATION

## 2022-10-13 LAB
LAB AP CASE REPORT: NORMAL
PATH REPORT.FINAL DX SPEC: NORMAL
PATH REPORT.GROSS SPEC: NORMAL

## 2022-10-14 ENCOUNTER — TELEPHONE (OUTPATIENT)
Dept: ORTHOPEDIC SURGERY | Facility: CLINIC | Age: 72
End: 2022-10-14

## 2022-10-14 NOTE — TELEPHONE ENCOUNTER
Hub staff attempted to follow warm transfer process and was unsuccessful     Caller: Eva Borrego    Relationship to patient: Self    Best call back number: [phone]    Patient is needing: POST OP APPT WITH DR SUDEEP ROBERTS ON 10/11/22 AND WANTED HER SEEN 1 WEEK AFTER SX , NO AVAILABILITY

## 2022-10-15 NOTE — OP NOTE
DISTAL RADIUS FRACTURE:    PATIENT NAME: Eva Borrego  PRINCIPAL DIAGNOSIS: left ulna and forearm bony mass.  SECONDARY DIAGNOSIS: Ectopic calcification left forearm  POSTOPERATIVE DIAGNOSIS: Cutaneous calcinosis left forearm  PROCEDURE PERFORMED: Resection of bony mass from left proximal ulna and left ulnar diaphysis  SURGEON: DORON SHEETS M.D.  ASSISTANT: Ana Darby first assistant was responsible for performing the following activities: Retraction, Suction, Irrigation, Suturing, Closing and Placing Dressing and their skilled assistance was necessary for the success of this case.     ANESTHESIOLOGIST: Dr. Dimitris Alexander MD  ANESTHESIA USED: Supraclavicular block for postop pain control followed by general anesthesia with an LMA.  ANALGESIC PROCEDURE USED: Supraclavicular block for pain control.  ESTIMATED BLOOD LOSS: 35cc  SPECIMENS: The resected bony mass was sent off for histopathological exam specifically to rule out malignancy and to see if any light could be shed on the etiology of the cutaneous calcification.  IMPLANTS USED: None.  COMPLICATIONS (IF ANY): None.    INDICATIONS: Patient is a very unusual presentation to the office.  She was seen by my partner Dr. Ray Giron.  She presents with heterotopic cutaneous ossification and calcification of the left forearm.  She has been having increasing pain and discomfort.  I have been concerned about an underlying malignancy.  Her ectopic calcification has slowly and progressively become worse and more symptomatic.  We had discussed resection of this mass only for symptomatic relief.  I could not guarantee her that I would be able to remove the entire mass because it was very widespread and densely adherent to the overlying skin.  At debulking of the mass was all that I could promise the patient for symptomatic relief.  We could not even give her any assurances with regards to possible recurrence of the calcification.  All risks, benefits and potential  complications have been discussed with the patient in extensive detail. Possibility of death, infection, myocardial infarction, DVT, pulmonary embolism, wound infection, skin breakdown, possibility of revision surgery, post-traumatic arthritis, RSD, neurovascular compromise, limited  strength, limited range of motion, delayed ability to go back to work and activities of daily living all discussed in great detail accordingly and informed consent is being signed.    PROCEDURE: Surgical timeout was called. Operative extremity was correctly identified in the operation room suite. C-arm was used through the entire procedure to assess the adequacy of the reduction of the fracture and the position of the implants. Tourniquet was applied after the operative extremity was exsanguinated. Patient was placed under appropriate anesthetic and antibiotics were administered per SCIP protocol.      A dorsal approach was carried out.  We started the incision at the tip of the olecranon process of the left ulna and carried that down for about 6 inches along the subcutaneous border of the ulna.  The intraoperative findings included dense bony tissue which was intensely calcified and adherent to the overlying skin.  Just medial to the proximal ulna and posterior to the medial epicondyle of the humerus the ulnar nerve was densely wrapped up with the ectopic bony mass.  To be performed very careful and gentle dissection and made every attempt to preserve the fascicles of the ulnar nerve.    Hematoma was evacuated and the painstakingly dissected out all the way to the subcutaneous border of the ulna.  We made a very diligent dissection to prevent percutaneous transaction of the overlying skin and dermis.  As we had discussed in the preoperative discussion I could only mostly perform a debulking of the bony mass.  We had to be very cognizant about the neurovascular structures specifically the ulnar nerve.  We were also very cognizant  about not making the skin flaps to thin to prevent devascularization and necrosis.  Wounds were lavaged with Bacitracin irrigating solution. The pronator quadratus tendon was carefully approximated with interrupted sutures. Half percent Naropin plain 5cc was used locally for postoperative analgesia. Subcuticular sutures were applied.  This was followed by application of staples to the skin.      Occlusive dressing was applied. Patient tolerated the procedure well. Sponge, gauze and needle count was correct. No complications were encountered. A postoperative splint was applied to control the position of the extremity as well as to protect the fracture. Patient was reversed from anesthetic and taken from the operating room to the recovery room in stable condition. I discussed the satisfactory performance of the procedure with the patient’s family, and answered all questions for them.    Jeremy Trinh MD  10/14/2022  20:34 EDT

## 2022-10-19 ENCOUNTER — OFFICE VISIT (OUTPATIENT)
Dept: ORTHOPEDIC SURGERY | Facility: CLINIC | Age: 72
End: 2022-10-19

## 2022-10-19 DIAGNOSIS — R22.32 MASS OF LEFT HAND: ICD-10-CM

## 2022-10-19 DIAGNOSIS — M25.522 LEFT ELBOW PAIN: Primary | ICD-10-CM

## 2022-10-19 PROCEDURE — 99024 POSTOP FOLLOW-UP VISIT: CPT | Performed by: ORTHOPAEDIC SURGERY

## 2022-10-19 NOTE — PROGRESS NOTES
OTHER POST OP GLOBAL     NAME: Eva Borrego  ?  : 1950  ?  MRN: [id number]    CC: Follow-up on left elbow/forearm mass resection.  ?  Date of surgery: 2022    HPI:   Patient returns today for 8 day follow up of left Forearm resection of a bony mass . Incision(s) healing nicely/as expected with no signs of infection. Patient reports doing well with no unusual complaints. Appears to be progressing appropriately.      Ortho Exam:   Left forearm: The incision is clean healing nicely.  There is no cellulitis or erythema.  She is neurovascularly intact.  Elbow range of motion is 0 to 120 degrees of flexion.  Cap refill is 2 seconds with a brisk return.  There is no evidence of infection.      Diagnostic Studies:  no diagnostic testing performed this visit      Assessment:  Diagnoses and all orders for this visit:    1. Left elbow pain (Primary)    2. Mass of left hand          Plan   • Incision care  • Continue ice as needed  • Gentle active ROM  • Stretching and strengthening exercises  • Tylenol 500-1000mg by mouth every 6 hours as needed for pain   • Fall precautions  • Follow up in 4 week(s)     Date of encounter: 10/19/2022  Jeremy Trinh MD

## 2022-10-27 ENCOUNTER — HOSPITAL ENCOUNTER (OUTPATIENT)
Dept: ONCOLOGY | Facility: HOSPITAL | Age: 72
Setting detail: INFUSION SERIES
Discharge: HOME | End: 2022-10-27

## 2022-10-27 ENCOUNTER — TELEPHONE (OUTPATIENT)
Dept: FAMILY MEDICINE CLINIC | Facility: CLINIC | Age: 72
End: 2022-10-27

## 2022-10-27 VITALS
TEMPERATURE: 98 F | SYSTOLIC BLOOD PRESSURE: 130 MMHG | HEART RATE: 121 BPM | WEIGHT: 130.1 LBS | DIASTOLIC BLOOD PRESSURE: 80 MMHG | BODY MASS INDEX: 23.05 KG/M2 | HEIGHT: 63 IN

## 2022-10-27 DIAGNOSIS — M81.0 SENILE OSTEOPOROSIS: Primary | ICD-10-CM

## 2022-10-27 LAB
ALBUMIN SERPL-MCNC: 4 G/DL (ref 3.5–5.2)
ALBUMIN/GLOB SERPL: 1.4 G/DL
ALP SERPL-CCNC: 65 U/L (ref 39–117)
ALT SERPL W P-5'-P-CCNC: 12 U/L (ref 1–33)
ANION GAP SERPL CALCULATED.3IONS-SCNC: 12 MMOL/L (ref 5–15)
AST SERPL-CCNC: 18 U/L (ref 1–32)
BILIRUB SERPL-MCNC: 0.2 MG/DL (ref 0–1.2)
BUN SERPL-MCNC: 14 MG/DL (ref 8–23)
BUN/CREAT SERPL: 27.5 (ref 7–25)
CALCIUM SPEC-SCNC: 9.5 MG/DL (ref 8.6–10.5)
CHLORIDE SERPL-SCNC: 100 MMOL/L (ref 98–107)
CO2 SERPL-SCNC: 26 MMOL/L (ref 22–29)
CREAT SERPL-MCNC: 0.51 MG/DL (ref 0.57–1)
EGFRCR SERPLBLD CKD-EPI 2021: 99.3 ML/MIN/1.73
GLOBULIN UR ELPH-MCNC: 2.8 GM/DL
GLUCOSE SERPL-MCNC: 119 MG/DL (ref 65–99)
MAGNESIUM SERPL-MCNC: 1.8 MG/DL (ref 1.6–2.4)
PHOSPHATE SERPL-MCNC: 3.3 MG/DL (ref 2.5–4.5)
POTASSIUM SERPL-SCNC: 4 MMOL/L (ref 3.5–5.2)
PROT SERPL-MCNC: 6.8 G/DL (ref 6–8.5)
SODIUM SERPL-SCNC: 138 MMOL/L (ref 136–145)

## 2022-10-27 PROCEDURE — 25010000002 DENOSUMAB 60 MG/ML SOLUTION PREFILLED SYRINGE: Performed by: FAMILY MEDICINE

## 2022-10-27 PROCEDURE — 83735 ASSAY OF MAGNESIUM: CPT | Performed by: FAMILY MEDICINE

## 2022-10-27 PROCEDURE — 80053 COMPREHEN METABOLIC PANEL: CPT | Performed by: FAMILY MEDICINE

## 2022-10-27 PROCEDURE — 96372 THER/PROPH/DIAG INJ SC/IM: CPT

## 2022-10-27 PROCEDURE — 84100 ASSAY OF PHOSPHORUS: CPT | Performed by: FAMILY MEDICINE

## 2022-10-27 RX ADMIN — DENOSUMAB 60 MG: 60 INJECTION SUBCUTANEOUS at 09:11

## 2022-10-27 NOTE — TELEPHONE ENCOUNTER
----- Message from Shannan Larsen DO sent at 10/27/2022 12:28 PM EDT -----  CMP---HR=774 (elevated a little)/ kidney and liver good  PO4/Mg ok

## 2022-10-27 NOTE — PROGRESS NOTES
Pt to the clinic for Prolia injection.  Pt reports to taking Vit d.  Denies having any upcoming dental work.  Injection given and tolerated well. AVS given prior to discharge.

## 2022-10-27 NOTE — TELEPHONE ENCOUNTER
HUB to read  My chart message was sent to the patient.     Blood sugar is 119 (elevated a little)/ kidney and liver good   PO4/Magnesium ok

## 2022-10-28 ENCOUNTER — FLU SHOT (OUTPATIENT)
Dept: FAMILY MEDICINE CLINIC | Facility: CLINIC | Age: 72
End: 2022-10-28

## 2022-10-28 DIAGNOSIS — Z23 NEED FOR INFLUENZA VACCINATION: Primary | ICD-10-CM

## 2022-10-28 PROCEDURE — G0008 ADMIN INFLUENZA VIRUS VAC: HCPCS | Performed by: FAMILY MEDICINE

## 2022-10-28 PROCEDURE — 90662 IIV NO PRSV INCREASED AG IM: CPT | Performed by: FAMILY MEDICINE

## 2022-11-02 ENCOUNTER — OFFICE VISIT (OUTPATIENT)
Dept: ORTHOPEDIC SURGERY | Facility: CLINIC | Age: 72
End: 2022-11-02

## 2022-11-02 DIAGNOSIS — L94.2 CALCINOSIS CUTIS: ICD-10-CM

## 2022-11-02 DIAGNOSIS — R22.32 MASS OF LEFT HAND: Primary | ICD-10-CM

## 2022-11-02 PROCEDURE — 99024 POSTOP FOLLOW-UP VISIT: CPT | Performed by: ORTHOPAEDIC SURGERY

## 2022-11-09 ENCOUNTER — OFFICE VISIT (OUTPATIENT)
Dept: ORTHOPEDIC SURGERY | Facility: CLINIC | Age: 72
End: 2022-11-09

## 2022-11-09 VITALS — HEIGHT: 63 IN | OXYGEN SATURATION: 98 % | HEART RATE: 102 BPM | WEIGHT: 130 LBS | BODY MASS INDEX: 23.04 KG/M2

## 2022-11-09 DIAGNOSIS — R22.32 MASS OF LEFT HAND: Primary | ICD-10-CM

## 2022-11-09 PROCEDURE — 99024 POSTOP FOLLOW-UP VISIT: CPT | Performed by: ORTHOPAEDIC SURGERY

## 2022-11-09 NOTE — PROGRESS NOTES
OTHER POST OP GLOBAL     NAME: Eva Borrego  ?  : 1950  ?  MRN: [id number]    CC: Follow-up on left ulnar-sided mass resection from the forearm.  ?  Date of surgery: Patient underwent left elbow mass resection in 2022.    HPI:   Patient returns today for 4 week follow up of left Ulnar mass resection from the forearm . Incision(s) healing nicely/as expected with no signs of infection. Patient reports doing well with no unusual complaints. Appears to be progressing appropriately.      Ortho Exam:   Left elbow: The incision is clean fully healed.  She is neurovascularly intact.  There is no ulnar nerve deficiency postoperatively.  Swelling and bruising are settled down nicely.  There is no recurrence of the bony mass postresection.      Diagnostic Studies:  no diagnostic testing performed this visit      Assessment:  Diagnoses and all orders for this visit:    1. Mass of left hand (Primary)          Plan   • Incision care and discontinue the staples today in the office.  • Continue ice as needed  • Gentle active ROM  • Stretching and strengthening exercises  • Tylenol 500-1000mg by mouth every 6 hours as needed for pain   • Fall precautions  • Follow up in 4 week(s)     Date of encounter: 2022  Jeremy Trinh MD

## 2022-11-10 DIAGNOSIS — M53.3 SACROILIAC JOINT PAIN: ICD-10-CM

## 2022-11-10 RX ORDER — HYDROCODONE BITARTRATE AND ACETAMINOPHEN 7.5; 325 MG/1; MG/1
1 TABLET ORAL EVERY 6 HOURS PRN
Qty: 120 TABLET | Refills: 0 | Status: SHIPPED | OUTPATIENT
Start: 2022-11-10 | End: 2022-12-12 | Stop reason: SDUPTHER

## 2022-11-10 NOTE — TELEPHONE ENCOUNTER
Caller: Eva Borrego    Relationship: Self    Best call back number: [phone]    Requested Prescriptions:   Requested Prescriptions     Pending Prescriptions Disp Refills   • HYDROcodone-acetaminophen (Norco) 7.5-325 MG per tablet 120 tablet 0     Sig: Take 1 tablet by mouth Every 6 (Six) Hours As Needed for Severe Pain.        Pharmacy where request should be sent: [facility] [id number] [place] [address] AT [facility] [address] [place] [address] - [phone] [facility] [phone] FX     Additional details provided by patient: PATIENT STATES THAT SHE WILL BE OUT OF MEDICATION TOMORROW    Does the patient have less than a 3 day supply:  [x] Yes  [] No    Bryce Denise Rep   11/10/22 10:47 EST

## 2022-11-14 NOTE — PROGRESS NOTES
OTHER POST OP GLOBAL     NAME: Eva Borrego  ?  : 1950  ?  MRN: [id number]    Follow-up on left elbow mass resection.  ?  Date of surgery: On 2022 patient underwent resection of a mass from the dorsal aspect of the elbow.    HPI:   Patient returns today for 3 week follow up of left Elbow and olecranon process bony mass resection. . Incision(s) healing nicely/as expected with no signs of infection. Patient reports doing well with no unusual complaints. Appears to be progressing appropriately.      Ortho Exam:   Left elbow: The incision is clean healing nicely.  She is neurovascularly intact.  She does not demonstrate any signs of wound dehiscence.  No localized drainage is noted.  There is no ulnar nerve symptom complex postoperatively.      Diagnostic Studies:  no diagnostic testing performed this visit      Assessment:  Diagnoses and all orders for this visit:    1. Mass of left hand (Primary)    2. Calcinosis cutis          Plan   • Incision care  • Continue ice as needed  • Gentle active ROM  • Stretching and strengthening exercises  • Tylenol 500-1000mg by mouth every 6 hours as needed for pain   • Fall precautions  • Follow up in 4 week(s)     Date of encounter: 2022  Jeremy Trinh MD

## 2022-11-21 RX ORDER — IPRATROPIUM BROMIDE AND ALBUTEROL SULFATE 2.5; .5 MG/3ML; MG/3ML
SOLUTION RESPIRATORY (INHALATION)
Qty: 360 ML | Refills: 0 | Status: SHIPPED | OUTPATIENT
Start: 2022-11-21

## 2022-11-21 NOTE — TELEPHONE ENCOUNTER
Rx Refill Note  Requested Prescriptions     Pending Prescriptions Disp Refills   • ipratropium-albuterol (DUO-NEB) 0.5-2.5 mg/3 ml nebulizer [Pharmacy Med Name: IPRAT-ALBUT 0.5-3 MG/3 ML (30 VLS)] 360 mL 0     Sig: USE 1 VIAL PER NEBULIZER EVERY 4 HOURS AS NEEDED FOR WHEEZING      Last office visit with prescribing clinician: 8/12/2022      Next office visit with prescribing clinician: Visit date not found     Lipid Panel (08/05/2022 08:56)  Comprehensive Metabolic Panel (04/29/2022 15:36)         Radha Le, WINSOME  11/21/22, 17:07 EST

## 2022-12-12 DIAGNOSIS — M53.3 SACROILIAC JOINT PAIN: ICD-10-CM

## 2022-12-12 RX ORDER — HYDROCODONE BITARTRATE AND ACETAMINOPHEN 7.5; 325 MG/1; MG/1
1 TABLET ORAL EVERY 6 HOURS PRN
Qty: 120 TABLET | Refills: 0 | Status: SHIPPED | OUTPATIENT
Start: 2022-12-12 | End: 2023-01-13 | Stop reason: SDUPTHER

## 2022-12-12 NOTE — TELEPHONE ENCOUNTER
Caller: Eva Borrego    Relationship: Self    Best call back number: [phone]    Requested Prescriptions:   Requested Prescriptions     Pending Prescriptions Disp Refills   • HYDROcodone-acetaminophen (Norco) 7.5-325 MG per tablet 120 tablet 0     Sig: Take 1 tablet by mouth Every 6 (Six) Hours As Needed for Severe Pain.        Pharmacy where request should be sent: [facility] [id number] [place] [id number] AT [facility] [address] [place] [address] - [phone] [facility] [phone] FX     Additional details provided by patient: PATIENT WILL BE OUT OF MEDICATION AFTER TODAY    Does the patient have less than a 3 day supply:  [x] Yes  [] No    Would you like a call back once the refill request has been completed: [] Yes [x] No    If the office needs to give you a call back, can they leave a voicemail: [x] Yes [] No    Bryce Lopez Rep   12/12/22 10:12 EST

## 2022-12-12 NOTE — TELEPHONE ENCOUNTER
Rx Refill Note  Requested Prescriptions     Pending Prescriptions Disp Refills   • HYDROcodone-acetaminophen (Norco) 7.5-325 MG per tablet 120 tablet 0     Sig: Take 1 tablet by mouth Every 6 (Six) Hours As Needed for Severe Pain.      Last office visit with prescribing clinician: 8/12/2022   Last telemedicine visit with prescribing clinician: Visit date not found   Next office visit with prescribing clinician: Visit date not found     Comprehensive Metabolic Panel (10/27/2022 09:01)   CBC (No Diff) (10/07/2022 11:18)  Lipid Panel (08/05/2022 08:56)                      Would you like a call back once the refill request has been completed: [] Yes [] No    If the office needs to give you a call back, can they leave a voicemail: [] Yes [] No    INSPECT in chart     Radha Le CMA  12/12/22, 10:23 EST

## 2022-12-21 ENCOUNTER — OFFICE VISIT (OUTPATIENT)
Dept: ORTHOPEDIC SURGERY | Facility: CLINIC | Age: 72
End: 2022-12-21
Payer: MEDICARE

## 2022-12-21 VITALS — WEIGHT: 130 LBS | OXYGEN SATURATION: 100 % | HEIGHT: 63 IN | BODY MASS INDEX: 23.04 KG/M2 | HEART RATE: 70 BPM

## 2022-12-21 DIAGNOSIS — R22.32 MASS OF LEFT HAND: Primary | ICD-10-CM

## 2022-12-21 PROCEDURE — 99024 POSTOP FOLLOW-UP VISIT: CPT | Performed by: ORTHOPAEDIC SURGERY

## 2022-12-21 NOTE — PROGRESS NOTES
Chief Complaint  Follow-up of the Left Arm    Subjective    History of Present Illness      Eva Borrego is a 72 y.o. female who presents to [facility] for follow-up on resection of a calcified cutaneous mass from the left forearm.  History of Present Illness the patient is doing well postoperatively.  There is no ulnar nerve dysfunction.  She is moving her fingers well.  She is able to use this left upper extremity for day-to-day function with activities of daily living without too much difficulty.  She is pleased with her postoperative outcome.  Pain Location:  LEFT elbow  Radiation: none  Quality: dull  Intensity/Severity: mild   Duration: 2 months  Progression of symptoms: no worsening, reports improvement    Objective   Vital Signs:   Pulse 70   Ht 160 cm (63\")   Wt 59 kg (130 lb)   SpO2 100%   BMI 23.03 kg/m²     Physical Exam  Physical Exam  Vitals signs and nursing note reviewed.   Constitutional:       Appearance: Normal appearance.   Pulmonary:      Effort: Pulmonary effort is normal.   Skin:     General: Skin is warm and dry.      Capillary Refill: Capillary refill takes less than 2 seconds.   Neurological:      General: No focal deficit present.      Mental Status: He is alert and oriented to person, place, and time. Mental status is at baseline.   Psychiatric:         Mood and Affect: Mood normal.         Behavior: Behavior normal.         Thought Content: Thought content normal.         Judgment: Judgment normal.     Ortho Exam   Left elbow: The incision is clean fully healed.  She has full range of motion of the elbow in flexion and pronation supination.  She is neurovascularly intact.  There is no evidence of an RSD.  Cap refill is 2 seconds with brisk return.  Local tenderness although present is minimal.      Result Review :   The following data was reviewed by: Jeremy Trinh MD on 12/21/2022:                Procedures           Assessment   Assessment and  Plan    Diagnoses and all orders for this visit:    1. Mass of left hand (Primary)          Follow Up   · Compression/brace can now be weaned off.  · Active aggressive range of motion of the elbow.  · Rest, ice, compression, and elevation (RICE) therapy  · Stretching and strengthening exercises  · OTC Tylenol 500-1000mg by mouth every 6 hours as needed for pain   · Follow up in 2 month(s)  • Patient was given instructions and counseling regarding her condition or for health maintenance advice. Please see specific information pulled into the AVS if appropriate.     Jeremy Trinh MD   Date of Encounter: 12/21/2022       EMR Dragon/Transcription disclaimer:  Much of this encounter note is an electronic transcription/translation of spoken language to printed text. The electronic translation of spoken language may permit erroneous, or at times, nonsensical words or phrases to be inadvertently transcribed; Although I have reviewed the note for such errors, some may still exist.

## 2023-01-05 ENCOUNTER — OFFICE VISIT (OUTPATIENT)
Dept: FAMILY MEDICINE CLINIC | Facility: CLINIC | Age: 73
End: 2023-01-05
Payer: MEDICARE

## 2023-01-05 VITALS
HEART RATE: 118 BPM | WEIGHT: 132 LBS | DIASTOLIC BLOOD PRESSURE: 83 MMHG | SYSTOLIC BLOOD PRESSURE: 122 MMHG | HEIGHT: 63 IN | OXYGEN SATURATION: 96 % | BODY MASS INDEX: 23.39 KG/M2 | TEMPERATURE: 98.2 F | RESPIRATION RATE: 14 BRPM

## 2023-01-05 DIAGNOSIS — M53.3 SACROILIAC JOINT PAIN: Primary | ICD-10-CM

## 2023-01-05 DIAGNOSIS — M81.0 SENILE OSTEOPOROSIS: ICD-10-CM

## 2023-01-05 DIAGNOSIS — J43.1 PANLOBULAR EMPHYSEMA: ICD-10-CM

## 2023-01-05 DIAGNOSIS — L94.2 CALCINOSIS CUTIS: ICD-10-CM

## 2023-01-05 DIAGNOSIS — E03.9 ACQUIRED HYPOTHYROIDISM: ICD-10-CM

## 2023-01-05 DIAGNOSIS — M81.0 AGE-RELATED OSTEOPOROSIS WITHOUT CURRENT PATHOLOGICAL FRACTURE: ICD-10-CM

## 2023-01-05 DIAGNOSIS — M25.522 LEFT ELBOW PAIN: ICD-10-CM

## 2023-01-05 DIAGNOSIS — Z12.31 ENCOUNTER FOR SCREENING MAMMOGRAM FOR BREAST CANCER: ICD-10-CM

## 2023-01-05 DIAGNOSIS — E78.2 MIXED HYPERLIPIDEMIA: ICD-10-CM

## 2023-01-05 PROCEDURE — 99214 OFFICE O/P EST MOD 30 MIN: CPT | Performed by: FAMILY MEDICINE

## 2023-01-05 NOTE — PROGRESS NOTES
Subjective   Eva Borrego is a 72 y.o. female.     Chief Complaint   Patient presents with   • COPD   • Pain   • Hypothyroidism   • calcinosis cuti         Current Outpatient Medications:   •  albuterol sulfate HFA (Ventolin HFA) 108 (90 Base) MCG/ACT inhaler, Inhale 2 puffs Every 6 (Six) Hours As Needed for Wheezing or Shortness of Air., Disp: 18 g, Rfl: 2  •  ALLEGRA-D ALLERGY & CONGESTION  MG per 12 hr tablet, TAKE ONE TABLET BY MOUTH TWICE A DAY AS NEEDED FOR ALLERGIES (Patient taking differently: Take 1 tablet by mouth Daily.), Disp: 60 tablet, Rfl: 0  •  Budeson-Glycopyrrol-Formoterol (Breztri Aerosphere) 160-9-4.8 MCG/ACT aerosol inhaler, Inhale 2 puffs 2 (Two) Times a Day., Disp: 10.7 g, Rfl: 0  •  denosumab (Prolia) 60 MG/ML solution prefilled syringe syringe, Inject 1 mL under the skin into the appropriate area as directed Every 6 (Six) Months., Disp: 180 mL, Rfl: 1  •  HYDROcodone-acetaminophen (Norco) 7.5-325 MG per tablet, Take 1 tablet by mouth Every 6 (Six) Hours As Needed for Severe Pain., Disp: 120 tablet, Rfl: 0  •  ipratropium-albuterol (DUO-NEB) 0.5-2.5 mg/3 ml nebulizer, USE 1 VIAL PER NEBULIZER EVERY 4 HOURS AS NEEDED FOR WHEEZING, Disp: 360 mL, Rfl: 0  •  levothyroxine (SYNTHROID, LEVOTHROID) 100 MCG tablet, Take 1 tablet by mouth Daily., Disp: 90 tablet, Rfl: 1  •  multivitamin with minerals tablet tablet, Take 1 tablet by mouth Daily., Disp: , Rfl:   •  omeprazole (priLOSEC) 20 MG capsule, Take 1 capsule by mouth Daily., Disp: 90 capsule, Rfl: 1  •  simvastatin (ZOCOR) 10 MG tablet, Take 1 tablet by mouth Every Night., Disp: 90 tablet, Rfl: 3  •  vitamin D3 125 MCG (5000 UT) capsule capsule, Take 1 capsule by mouth Every Other Day. 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   • Post-menopausal    • Sacroiliac joint pain    • Seasonal allergies    • Telangiectasia        Past Surgical History:   Procedure Laterality Date   • CHOLECYSTECTOMY     • COLONOSCOPY      TA = 2018/ 2021, rech 2024             GSI   • EYE SURGERY      B/L Cataract/ Lens Lt   • JOINT REPLACEMENT Right     THR---2002/ 2017   • MASS EXCISION Left 10/11/2022    Procedure: EXCISION MASS UPPER EXTREMITY, ELBOW;  Surgeon: Jeremy Trinh MD;  Location: [facility] MAIN OR;  Service: Orthopedics;  Laterality: Left;   • SPINE SURGERY      Discectomy Lumbar   • TONSILLECTOMY         Family History   Problem Relation Age of Onset   • Diabetes Mother    • Hyperlipidemia Father    • Aortic aneurysm Father    • Alcohol abuse Sister    • Kidney disease Brother         ESRD=HD   • COPD Brother    • Hypertension Brother    • Arthritis Brother    • Lung cancer Brother    • Arthritis Maternal Grandmother    • Osteoporosis Maternal Grandmother    • Stroke Paternal Grandfather        Social History     Socioeconomic History   • Marital status:    Tobacco Use   • Smoking status: Former     Packs/day: 0.50     Years: 40.00     Pack years: 20.00     Types: Cigarettes     Start date: 1969     Quit date: 10/11/2012     Years since quitting: 10.2     Passive exposure: Never   • Smokeless tobacco: Current   • Tobacco comments:     Vapes 3mL-8   Vaping Use   • Vaping Use: Every day   • Substances: Flavoring, menthol 12 mg   • Devices: Refillable tank   Substance and Sexual Activity   • Alcohol use: No   • Drug use: No   • Sexual activity: Defer       History of Present Illness  71y/o C female here for f/u on chronic pain/ COPD  w/ recent sx on Left UE for calcinosis cutis    Pt states she had a lot of the calcifications removed from her Left UE/ Elbow recently and still healing but a lot better and less painful...... she has f/u w/ Dr. Trinh coming up soon     Pt states the current pain med and dose seem to be working for her and allows her to be productive           The following portions of the patient's history were reviewed and updated as appropriate: allergies, current medications, past family history, past medical history, past social history, past surgical history and problem list.    Review of Systems   Constitutional: Negative for activity change, appetite change, fatigue, unexpected weight gain and unexpected weight loss.   Respiratory: Negative for cough, chest tightness and shortness of breath.    Cardiovascular: Negative for chest pain, palpitations and leg swelling.   Endocrine: Negative for cold intolerance and heat intolerance.   Genitourinary: Negative for frequency, urgency and urinary incontinence.   Musculoskeletal: Positive for arthralgias. Negative for myalgias.   Skin: Positive for wound. Negative for rash.   Neurological: Negative for dizziness, facial asymmetry, speech difficulty, weakness, light-headedness, headache, memory problem and confusion.       Vitals:    01/05/23 1315   BP: 122/83   Pulse: 118   Resp: 14   Temp: 98.2 °F (36.8 °C)   SpO2: 96%       Objective   Physical Exam  Vitals and nursing note reviewed.   Constitutional:       General: She is not in acute distress.     Appearance: She is well-developed and normal weight. She is not ill-appearing or toxic-appearing.   HENT:      Head: Normocephalic and atraumatic.   Cardiovascular:      Rate and Rhythm: Normal rate and regular rhythm.      Heart sounds: Normal heart sounds. No murmur heard.  Pulmonary:      Effort: Pulmonary effort is normal. No respiratory distress.      Breath sounds: Normal breath sounds. No stridor. No wheezing, rhonchi or rales.    Skin:     General: Skin is warm and dry.      Findings: Wound present. No rash.          Neurological:      Mental Status: She is alert and oriented to person, place, and time.      Cranial Nerves: No cranial nerve deficit.   Psychiatric:         Mood and Affect: Mood normal.         Behavior: Behavior normal.         Thought Content: Thought content normal.         Judgment: Judgment normal.           Assessment & Plan   Diagnoses and all orders for this visit:    1. Sacroiliac joint pain (Primary)    2. Panlobular emphysema (HCC)    3. Left elbow pain    4. Calcinosis cutis    5. Acquired hypothyroidism  -     T4, Free; Future  -     TSH; Future    6. Senile osteoporosis  -     DEXA Bone Density Axial; Future    7. Mixed hyperlipidemia  -     Lipid Panel; Future  -     Comprehensive Metabolic Panel; Future    8. Age-related osteoporosis without current pathological fracture  -     Vitamin D,25-Hydroxy; Future    9. Encounter for screening mammogram for breast cancer  -     Mammo Screening Digital Tomosynthesis Bilateral With CAD; Future    Rx---Mammo/ DEXA  Fasting labs in 6mos  Pt to f/u w/ ortho for f/u on recent sx and scheduling of next sx on Left elbow calculi

## 2023-01-13 DIAGNOSIS — M53.3 SACROILIAC JOINT PAIN: ICD-10-CM

## 2023-01-13 RX ORDER — HYDROCODONE BITARTRATE AND ACETAMINOPHEN 7.5; 325 MG/1; MG/1
1 TABLET ORAL EVERY 6 HOURS PRN
Qty: 120 TABLET | Refills: 0 | Status: SHIPPED | OUTPATIENT
Start: 2023-01-13 | End: 2023-02-14 | Stop reason: SDUPTHER

## 2023-01-13 NOTE — TELEPHONE ENCOUNTER
Caller: Borrego Eva    Relationship: Self    Best call back number: [phone]    Requested Prescriptions:   Requested Prescriptions     Pending Prescriptions Disp Refills   • HYDROcodone-acetaminophen (Norco) 7.5-325 MG per tablet 120 tablet 0     Sig: Take 1 tablet by mouth Every 6 (Six) Hours As Needed for Severe Pain.        Pharmacy where request should be sent: [facility] [id number] [place] [address] AT [facility] [address] [place] [address] - [phone] [facility] [phone] FX     Additional details provided by patient: PATIENT IS OUT OF MEDICATION    Does the patient have less than a 3 day supply:  [x] Yes  [] No    Would you like a call back once the refill request has been completed: [x] Yes [] No    If the office needs to give you a call back, can they leave a voicemail: [x] Yes [] No    Bryce Barbosa Rep   01/13/23 09:14 EST

## 2023-01-31 ENCOUNTER — OFFICE VISIT (OUTPATIENT)
Dept: FAMILY MEDICINE CLINIC | Facility: CLINIC | Age: 73
End: 2023-01-31
Payer: MEDICARE

## 2023-01-31 VITALS
HEART RATE: 116 BPM | OXYGEN SATURATION: 96 % | BODY MASS INDEX: 23.04 KG/M2 | SYSTOLIC BLOOD PRESSURE: 128 MMHG | DIASTOLIC BLOOD PRESSURE: 82 MMHG | RESPIRATION RATE: 18 BRPM | HEIGHT: 63 IN | WEIGHT: 130 LBS | TEMPERATURE: 98.2 F

## 2023-01-31 DIAGNOSIS — M54.32 SCIATICA OF LEFT SIDE: Primary | ICD-10-CM

## 2023-01-31 DIAGNOSIS — L60.8 SPLINTER HEMORRHAGE OF TOENAIL: ICD-10-CM

## 2023-01-31 PROCEDURE — 99213 OFFICE O/P EST LOW 20 MIN: CPT | Performed by: FAMILY MEDICINE

## 2023-01-31 RX ORDER — METHYLPREDNISOLONE 4 MG/1
TABLET ORAL
Qty: 21 TABLET | Refills: 0 | Status: SHIPPED | OUTPATIENT
Start: 2023-01-31

## 2023-01-31 RX ORDER — CYCLOBENZAPRINE HCL 10 MG
10 TABLET ORAL NIGHTLY
Qty: 10 TABLET | Refills: 0 | Status: SHIPPED | OUTPATIENT
Start: 2023-01-31

## 2023-01-31 NOTE — PROGRESS NOTES
Subjective   Eva Borrego is a 72 y.o. female.     Chief Complaint   Patient presents with   • Sciatica         Current Outpatient Medications:   •  albuterol sulfate HFA (Ventolin HFA) 108 (90 Base) MCG/ACT inhaler, Inhale 2 puffs Every 6 (Six) Hours As Needed for Wheezing or Shortness of Air., Disp: 18 g, Rfl: 2  •  ALLEGRA-D ALLERGY & CONGESTION  MG per 12 hr tablet, TAKE ONE TABLET BY MOUTH TWICE A DAY AS NEEDED FOR ALLERGIES (Patient taking differently: Take 1 tablet by mouth Daily.), Disp: 60 tablet, Rfl: 0  •  Budeson-Glycopyrrol-Formoterol (Breztri Aerosphere) 160-9-4.8 MCG/ACT aerosol inhaler, Inhale 2 puffs 2 (Two) Times a Day., Disp: 10.7 g, Rfl: 0  •  denosumab (Prolia) 60 MG/ML solution prefilled syringe syringe, Inject 1 mL under the skin into the appropriate area as directed Every 6 (Six) Months., Disp: 180 mL, Rfl: 1  •  HYDROcodone-acetaminophen (Norco) 7.5-325 MG per tablet, Take 1 tablet by mouth Every 6 (Six) Hours As Needed for Severe Pain., Disp: 120 tablet, Rfl: 0  •  ipratropium-albuterol (DUO-NEB) 0.5-2.5 mg/3 ml nebulizer, USE 1 VIAL PER NEBULIZER EVERY 4 HOURS AS NEEDED FOR WHEEZING, Disp: 360 mL, Rfl: 0  •  levothyroxine (SYNTHROID, LEVOTHROID) 100 MCG tablet, Take 1 tablet by mouth Daily., Disp: 90 tablet, Rfl: 1  •  multivitamin with minerals tablet tablet, Take 1 tablet by mouth Daily., Disp: , Rfl:   •  omeprazole (priLOSEC) 20 MG capsule, Take 1 capsule by mouth Daily., Disp: 90 capsule, Rfl: 1  •  simvastatin (ZOCOR) 10 MG tablet, Take 1 tablet by mouth Every Night., Disp: 90 tablet, Rfl: 3  •  vitamin D3 125 MCG (5000 UT) capsule capsule, Take 1 capsule by mouth Every Other Day. 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   • CHOL    • COPD    • DEXA     2017 =( -0.1/ -3.0); 2019 =(0.7/ -2.9); 2020 = (0.9/ -2.7)   • GERD    • History of gastric ulcer    • History of transfusion    • Hypothyroidism    • MAMMO     NEG = 2018/ 2019/ 2020/ 2022   • Osteoarthritis    • 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   • Post-menopausal    • Sacroiliac joint pain    • Seasonal allergies    • Telangiectasia        Past Surgical History:   Procedure Laterality Date   • CHOLECYSTECTOMY     • COLONOSCOPY      TA = 2018/ 2021, rech 2024             GSI   • EYE SURGERY      B/L Cataract/ Lens Lt   • JOINT REPLACEMENT Right     THR---2002/ 2017   • MASS EXCISION Left 10/11/2022    Procedure: EXCISION MASS UPPER EXTREMITY, ELBOW;  Surgeon: Jeremy Trinh MD;  Location: [facility] MAIN OR;  Service: Orthopedics;  Laterality: Left;   • SPINE SURGERY      Discectomy Lumbar   • TONSILLECTOMY         Family History   Problem Relation Age of Onset   • Diabetes Mother    • Hyperlipidemia Father    • Aortic aneurysm Father    • Alcohol abuse Sister    • Kidney disease Brother         ESRD=HD   • COPD Brother    • Hypertension Brother    • Arthritis Brother    • Lung cancer Brother    • Arthritis Maternal Grandmother    • Osteoporosis Maternal Grandmother    • Stroke Paternal Grandfather        Social History     Socioeconomic History   • Marital status:    Tobacco Use   • Smoking status: Former     Packs/day: 0.50     Years: 40.00     Pack years: 20.00     Types: Cigarettes     Start date: 1969     Quit date: 10/11/2012     Years since quitting: 10.3     Passive exposure: Never   • Smokeless tobacco: Current   • Tobacco comments:     Vapes 3mL-8   Vaping Use   • Vaping Use: Every day   • Substances: Flavoring, menthol 12 mg   • Devices: Refillable  tank   Substance and Sexual Activity   • Alcohol use: No   • Drug use: No   • Sexual activity: Defer       History of Present Illness  73 y/o C female here w/ Left sciatica pain x 5 days and spot on her left baby toenail needing eval.    Pt states she just woke up w/ this on Friday and last time she had it, it was yrs ago----her prev PCP used to give her a few shots (?steroid/ pain med) and send her home....... it usu worked    Pt denies any recent change in activity to start this and is trying to stretch it out at home-----    Pt also notice the discoloring of left baby toe recently w/o known injury       The following portions of the patient's history were reviewed and updated as appropriate: allergies, current medications, past family history, past medical history, past social history, past surgical history and problem list.    Review of Systems   Constitutional: Negative for activity change, appetite change, unexpected weight gain and unexpected weight loss.   Musculoskeletal: Positive for arthralgias.   Skin: Positive for bruise. Negative for rash.   Neurological: Positive for numbness.   Psychiatric/Behavioral: Positive for sleep disturbance.       Vitals:    01/31/23 0842   BP: 128/82   Pulse: 116   Resp: 18   Temp: 98.2 °F (36.8 °C)   SpO2: 96%       Objective   Physical Exam  Vitals and nursing note reviewed.   Constitutional:       General: She is not in acute distress.     Appearance: Normal appearance. She is not ill-appearing or toxic-appearing.   Musculoskeletal:         General: Signs of injury present.      Thoracic back: No bony tenderness.      Lumbar back: No bony tenderness.        Back:         Feet:    Skin:     Findings: No erythema or rash.   Neurological:      Mental Status: She is alert and oriented to person, place, and time.      Cranial Nerves: No cranial nerve deficit.   Psychiatric:         Mood and Affect: Mood normal.         Behavior: Behavior normal.         Thought Content:  Thought content normal.         Judgment: Judgment normal.           Assessment & Plan   Diagnoses and all orders for this visit:    1. Sciatica of left side (Primary)    2. Splinter hemorrhage of toenail    Other orders  -     methylPREDNISolone (MEDROL) 4 MG dose pack; Take as directed on package instructions.  Dispense: 21 tablet; Refill: 0  -     cyclobenzaprine (FLEXERIL) 10 MG tablet; Take 1 tablet by mouth Every Night.  Dispense: 10 tablet; Refill: 0      Trial of steroid taper/ ms relaxer w/ stretches   Pt to monitor toenail for resolution of discoloration  May consider PTx INB

## 2023-02-08 ENCOUNTER — OFFICE VISIT (OUTPATIENT)
Dept: FAMILY MEDICINE CLINIC | Facility: CLINIC | Age: 73
End: 2023-02-08
Payer: MEDICARE

## 2023-02-08 VITALS
OXYGEN SATURATION: 97 % | BODY MASS INDEX: 23.57 KG/M2 | WEIGHT: 133 LBS | SYSTOLIC BLOOD PRESSURE: 120 MMHG | TEMPERATURE: 97.8 F | DIASTOLIC BLOOD PRESSURE: 78 MMHG | HEIGHT: 63 IN | RESPIRATION RATE: 16 BRPM | HEART RATE: 120 BPM

## 2023-02-08 DIAGNOSIS — J02.9 SORE THROAT: Primary | ICD-10-CM

## 2023-02-08 DIAGNOSIS — J02.9 ACUTE PHARYNGITIS, UNSPECIFIED ETIOLOGY: ICD-10-CM

## 2023-02-08 LAB
EXPIRATION DATE: NORMAL
INTERNAL CONTROL: NORMAL
Lab: NORMAL
S PYO AG THROAT QL: NEGATIVE

## 2023-02-08 PROCEDURE — 99213 OFFICE O/P EST LOW 20 MIN: CPT | Performed by: NURSE PRACTITIONER

## 2023-02-08 PROCEDURE — 87880 STREP A ASSAY W/OPTIC: CPT | Performed by: NURSE PRACTITIONER

## 2023-02-08 RX ORDER — AMOXICILLIN 500 MG/1
1000 CAPSULE ORAL 2 TIMES DAILY
Qty: 40 CAPSULE | Refills: 0 | Status: SHIPPED | OUTPATIENT
Start: 2023-02-08

## 2023-02-08 NOTE — PROGRESS NOTES
"Chief Complaint  Chief Complaint   Patient presents with   • Sore Throat   • Earache     L ear        Subjective          Eva Borrego is a 72 year old female who presents to the office today with complaints of sore throat.    Sore throat- Woke up last night with a sore throat in the middle of the night. Looked at her throat and saw white patches on her throat. Left side of neck has been swollen. Noticed her left ear hurting. Has not been around anyone sick. Denies fever, chills, nausea, or vomiting. Has a slight headache. Has not tried anything at home to try to help.        Review of Systems   Constitutional: Negative for chills and fever.   HENT: Positive for ear pain and sore throat. Negative for congestion.    Respiratory: Positive for cough and shortness of breath.    Cardiovascular: Negative for chest pain.   Gastrointestinal: Negative for nausea and vomiting.   Genitourinary: Negative for difficulty urinating.   Musculoskeletal: Positive for back pain. Negative for arthralgias.   Skin: Negative.    Neurological: Positive for headache.        Objective   Vital Signs:   Vitals:    02/08/23 1106   BP: 120/78   Pulse: 120   Resp: 16   Temp: 97.8 °F (36.6 °C)   SpO2: 97%      Estimated body mass index is 23.56 kg/m² as calculated from the following:    Height as of this encounter: 160 cm (63\").    Weight as of this encounter: 60.3 kg (133 lb).            Physical Exam  Vitals reviewed.   Constitutional:       Appearance: Normal appearance. She is normal weight.   HENT:      Head: Normocephalic and atraumatic.      Ears:      Comments: Wax noted in right ear with some erythema      Nose: Nose normal.      Mouth/Throat:      Mouth: Mucous membranes are moist.      Pharynx: Oropharyngeal exudate and posterior oropharyngeal erythema present.      Comments: Patient has white patches all throughout mouth and tonsils, erythema noted   Eyes:      Extraocular Movements: Extraocular movements intact.      " Conjunctiva/sclera: Conjunctivae normal.      Pupils: Pupils are equal, round, and reactive to light.   Cardiovascular:      Rate and Rhythm: Normal rate and regular rhythm.      Pulses: Normal pulses.      Heart sounds: Normal heart sounds.      Comments: S1, S2 audible  Pulmonary:      Effort: Pulmonary effort is normal.      Breath sounds: Normal breath sounds.   Abdominal:      General: Abdomen is flat. Bowel sounds are normal.      Palpations: Abdomen is soft.   Musculoskeletal:         General: Normal range of motion.      Cervical back: Normal range of motion.   Skin:     General: Skin is warm and dry.   Neurological:      General: No focal deficit present.      Mental Status: She is alert and oriented to person, place, and time. Mental status is at baseline.   Psychiatric:         Mood and Affect: Mood normal.         Behavior: Behavior normal.         Thought Content: Thought content normal.         Judgment: Judgment normal.                Physical Exam   Result Review :             Procedures       Assessment and Plan     Diagnoses and all orders for this visit:    1. Sore throat (Primary)  -     POC Rapid Strep A    2. Acute pharyngitis, unspecified etiology  Assessment & Plan:  Woke up last night with a sore throat in the middle of the night. Looked at her throat and saw white patches on her throat. Left side of neck has been swollen. Noticed her left ear hurting. Has not been around anyone sick. Denies fever, chills, nausea, or vomiting. Has a slight headache. Has not tried anything at home to try to help.     Encourage chloraseptic spray    Stay hydrated     Prescribed amoxicillin    Strep negative, however patient has swollen tonsils, lymph nodes in neck, and drainage noted       Other orders  -     amoxicillin (AMOXIL) 500 MG capsule; Take 2 capsules by mouth 2 (Two) Times a Day.  Dispense: 40 capsule; Refill: 0            Follow Up   Return if symptoms worsen or fail to improve.   Patient was given  instructions and counseling regarding her condition or for health maintenance advice. Please see specific information pulled into the AVS if appropriate.

## 2023-02-08 NOTE — ASSESSMENT & PLAN NOTE
Woke up last night with a sore throat in the middle of the night. Looked at her throat and saw white patches on her throat. Left side of neck has been swollen. Noticed her left ear hurting. Has not been around anyone sick. Denies fever, chills, nausea, or vomiting. Has a slight headache. Has not tried anything at home to try to help.     Encourage chloraseptic spray    Stay hydrated     Prescribed amoxicillin    Strep negative, however patient has swollen tonsils, lymph nodes in neck, and drainage noted

## 2023-02-09 RX ORDER — LEVOTHYROXINE SODIUM 0.1 MG/1
TABLET ORAL
Qty: 90 TABLET | Refills: 1 | Status: SHIPPED | OUTPATIENT
Start: 2023-02-09

## 2023-02-14 ENCOUNTER — TELEPHONE (OUTPATIENT)
Dept: FAMILY MEDICINE CLINIC | Facility: CLINIC | Age: 73
End: 2023-02-14

## 2023-02-14 DIAGNOSIS — M53.3 SACROILIAC JOINT PAIN: ICD-10-CM

## 2023-02-14 RX ORDER — HYDROCODONE BITARTRATE AND ACETAMINOPHEN 7.5; 325 MG/1; MG/1
1 TABLET ORAL EVERY 6 HOURS PRN
Qty: 120 TABLET | Refills: 0 | Status: SHIPPED | OUTPATIENT
Start: 2023-02-14 | End: 2023-03-16 | Stop reason: SDUPTHER

## 2023-02-14 NOTE — TELEPHONE ENCOUNTER
Incoming Refill Request      Medication requested (name and dose):   HYDROcodone-acetaminophen (Norco) 7.5-325 MG per tablet  1 tablet, Every 6 Hours PRN         Pharmacy where request should be sent: [facility] [id number] - [place], [place] - [address] [place] [address] [address]  - [phone] PH - [phone] FX  [phone]    Additional details provided by patient: NONE    Best call back number: [phone]    Does the patient have less than a 3 day supply:  [x] Yes  [] No    Bryce Hou Rep  02/14/23, 14:10 EST

## 2023-02-14 NOTE — TELEPHONE ENCOUNTER
Rx Refill Note  Requested Prescriptions      No prescriptions requested or ordered in this encounter      Last office visit with prescribing clinician: 1/31/2023   Last telemedicine visit with prescribing clinician: 7/14/2023   Next office visit with prescribing clinician: Visit date not found     Lipid Panel (08/05/2022 08:56)  Comprehensive Metabolic Panel (04/29/2022 15:36)                      Would you like a call back once the refill request has been completed: [] Yes [] No    If the office needs to give you a call back, can they leave a voicemail: [] Yes [] No    Radha Le, [facility]  02/14/23, 16:33 EST     INSPECT in chart

## 2023-03-10 RX ORDER — OMEPRAZOLE 20 MG/1
CAPSULE, DELAYED RELEASE ORAL
Qty: 90 CAPSULE | Refills: 1 | Status: SHIPPED | OUTPATIENT
Start: 2023-03-10

## 2023-03-10 NOTE — TELEPHONE ENCOUNTER
Rx Refill Note  Requested Prescriptions     Pending Prescriptions Disp Refills   • omeprazole (priLOSEC) 20 MG capsule [Pharmacy Med Name: OMEPRAZOLE DR 20 MG CAPSULE] 90 capsule 1     Sig: TAKE ONE CAPSULE BY MOUTH DAILY      Last office visit with prescribing clinician: 1/31/2023   Last telemedicine visit with prescribing clinician: 7/14/2023   Next office visit with prescribing clinician: Visit date not found                       Lipid Panel (08/05/2022 08:56)    Would you like a call back once the refill request has been completed: [] Yes [] No    If the office needs to give you a call back, can they leave a voicemail: [] Yes [] No    Charisma Villegas, RT  03/10/23, 15:24 EST

## 2023-03-16 DIAGNOSIS — M53.3 SACROILIAC JOINT PAIN: ICD-10-CM

## 2023-03-16 NOTE — TELEPHONE ENCOUNTER
Caller: Borrego Eva    Relationship: Self    Best call back number: [phone]   Requested Prescriptions:   Requested Prescriptions     Pending Prescriptions Disp Refills   • HYDROcodone-acetaminophen (Norco) 7.5-325 MG per tablet 120 tablet 0     Sig: Take 1 tablet by mouth Every 6 (Six) Hours As Needed for Severe Pain.        Pharmacy where request should be sent: [facility] [id number] [place] [address] AT [facility] [address] [place] [address] - [phone] [facility] [phone] FX     Additional details provided by patient:WILL OUT OF MEDICATION 3/17/2023  Does the patient have less than a 3 day supply:  [x] Yes  [] No    Would you like a call back once the refill request has been completed: [x] Yes [] No    If the office needs to give you a call back, can they leave a voicemail: [x] Yes [] No    Mallory Enriquez   03/16/23 15:36 EDT

## 2023-03-17 RX ORDER — HYDROCODONE BITARTRATE AND ACETAMINOPHEN 7.5; 325 MG/1; MG/1
1 TABLET ORAL EVERY 6 HOURS PRN
Qty: 120 TABLET | Refills: 0 | Status: SHIPPED | OUTPATIENT
Start: 2023-03-17

## 2023-04-12 ENCOUNTER — OFFICE VISIT (OUTPATIENT)
Dept: ORTHOPEDIC SURGERY | Facility: CLINIC | Age: 73
End: 2023-04-12
Payer: MEDICARE

## 2023-04-12 DIAGNOSIS — M25.522 LEFT ELBOW PAIN: Primary | ICD-10-CM

## 2023-04-12 DIAGNOSIS — L94.2 CALCINOSIS CUTIS: ICD-10-CM

## 2023-04-12 PROCEDURE — 1159F MED LIST DOCD IN RCRD: CPT | Performed by: ORTHOPAEDIC SURGERY

## 2023-04-12 PROCEDURE — 1160F RVW MEDS BY RX/DR IN RCRD: CPT | Performed by: ORTHOPAEDIC SURGERY

## 2023-04-12 PROCEDURE — 99213 OFFICE O/P EST LOW 20 MIN: CPT | Performed by: ORTHOPAEDIC SURGERY

## 2023-04-12 NOTE — PROGRESS NOTES
Chief Complaint  Follow-up of the Left Elbow    Subjective    History of Present Illness      Eva Borrego is a 72 y.o. 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  History of Present Illness the patient underwent resection of left elbow ectopic calcification mass about 3 months ago.  She has almost completely healed the incision.  There is no local drainage.  There is a slight prominence of the underlying bony spicules.  I have discussed that with the patient and her son that this is most likely some form of recurrence of abnormal calcification of the collagen tissue in this location.  There is no abscess formation.  There is no lymphangitis.  She has a full range of motion of the left elbow and is very pleased with the functional return of activities and use of her elbow for ADLs.  Pain Location:  LEFT elbow  Radiation: none  Quality: dull  Intensity/Severity: mild   Duration: 6 months  Progression of symptoms: no worsening, symptoms stable/unchanged        Objective   Vital Signs:   There were no vitals taken for this visit.    Physical Exam  Physical Exam  Vitals signs and nursing note reviewed.   Constitutional:       Appearance: Normal appearance.   Pulmonary:      Effort: Pulmonary effort is normal.   Skin:     General: Skin is warm and dry.      Capillary Refill: Capillary refill takes less than 2 seconds.   Neurological:      General: No focal deficit present.      Mental Status: He is alert and oriented to person, place, and time. Mental status is at baseline.   Psychiatric:         Mood and Affect: Mood normal.         Behavior: Behavior normal.         Thought Content: Thought content normal.         Judgment: Judgment normal.     Ortho Exam   Left elbow: The incision is clean fully healed.  She has full range of motion of the elbow in flexion and pronation supination.  She is neurovascularly intact.   There is no evidence of an RSD.  Cap refill is 2 seconds with brisk return.  Local tenderness although present is minimal    Result Review :   The following data was reviewed by: Jeremy Trinh MD on 04/12/2023:  Radiologic studies - see below for interpretation  no diagnostic testing performed this visit            Procedures           Assessment   Assessment and Plan    Diagnoses and all orders for this visit:    1. Left elbow pain (Primary)    2. Calcinosis cutis          Follow Up   · Compression/brace to protect the elbow.  · We will keep a close eye on any possibility of recurrence that is causing some kind of symptoms for the patient.  · Recommending close observation and continued unrestricted activities at this point.  · Rest, ice, compression, and elevation (RICE) therapy  · Stretching and strengthening exercises of the elbow flexors and extensors.  · OTC Alternate Ibuprofen and Tylenol as needed  · Follow up in 2 month(s)  • Patient was given instructions and counseling regarding her condition or for health maintenance advice. Please see specific information pulled into the AVS if appropriate.     Jeremy Trinh MD   Date of Encounter: 4/12/2023   Electronically signed by Jeremy Trinh MD, 04/12/23, 3:33 PM EDT.     EMR Dragon/Transcription disclaimer:  Much of this encounter note is an electronic transcription/translation of spoken language to printed text. The electronic translation of spoken language may permit erroneous, or at times, nonsensical words or phrases to be inadvertently transcribed; Although I have reviewed the note for such errors, some may still exist.

## 2023-04-17 DIAGNOSIS — M53.3 SACROILIAC JOINT PAIN: ICD-10-CM

## 2023-04-17 RX ORDER — HYDROCODONE BITARTRATE AND ACETAMINOPHEN 7.5; 325 MG/1; MG/1
1 TABLET ORAL EVERY 6 HOURS PRN
Qty: 120 TABLET | Refills: 0 | Status: CANCELLED | OUTPATIENT
Start: 2023-04-17

## 2023-04-17 RX ORDER — HYDROCODONE BITARTRATE AND ACETAMINOPHEN 7.5; 325 MG/1; MG/1
1 TABLET ORAL EVERY 6 HOURS PRN
Qty: 120 TABLET | Refills: 0 | Status: SHIPPED | OUTPATIENT
Start: 2023-04-17

## 2023-04-17 NOTE — TELEPHONE ENCOUNTER
Caller: Eva Borrego    Relationship: Self    Best call back number: [phone]    Requested Prescriptions:   Requested Prescriptions     Pending Prescriptions Disp Refills   • HYDROcodone-acetaminophen (Norco) 7.5-325 MG per tablet 120 tablet 0     Sig: Take 1 tablet by mouth Every 6 (Six) Hours As Needed for Severe Pain.        Pharmacy where request should be sent: [facility] [id number] [place] [address] AT [facility] [address] [place] [address] - [phone] [facility] [phone] FX     Last office visit with prescribing clinician: 1/31/2023   Last telemedicine visit with prescribing clinician: 7/14/2023   Next office visit with prescribing clinician: Visit date not found     Additional details provided by patient: PATIENT HAS HALF A DAY SUPPLY LEFT     Does the patient have less than a 3 day supply:  [x] Yes  [] No    Would you like a call back once the refill request has been completed: [] Yes [x] No    If the office needs to give you a call back, can they leave a voicemail: [] Yes [x] No    Bryce Glaser Rep   04/17/23 15:10 EDT

## 2023-04-21 ENCOUNTER — TELEPHONE (OUTPATIENT)
Dept: FAMILY MEDICINE CLINIC | Facility: CLINIC | Age: 73
End: 2023-04-21

## 2023-04-21 NOTE — TELEPHONE ENCOUNTER
Caller: Eva Borrego    Relationship: Self    Best call back number: [phone]    What is the best time to reach you: ANY TIME    Who are you requesting to speak with (clinical staff, provider,  specific staff member): CLINICAL STAFF    What was the call regarding: PATIENT IS SCHEDULED FOR HER PROLIA INJECTION 4/27/23 AND SHE WANTS TO KNOW IF SHE NEEDS TO HAVE ANY LABS DONE PRIOR TO GETTING INJECTION.    PLEASE ADVISE    Do you require a callback: YES

## 2023-04-24 DIAGNOSIS — M81.0 AGE-RELATED OSTEOPOROSIS WITHOUT CURRENT PATHOLOGICAL FRACTURE: Primary | ICD-10-CM

## 2023-04-25 ENCOUNTER — CLINICAL SUPPORT (OUTPATIENT)
Dept: FAMILY MEDICINE CLINIC | Facility: CLINIC | Age: 73
End: 2023-04-25
Payer: MEDICARE

## 2023-04-25 DIAGNOSIS — M81.0 AGE-RELATED OSTEOPOROSIS WITHOUT CURRENT PATHOLOGICAL FRACTURE: ICD-10-CM

## 2023-04-25 PROCEDURE — 82310 ASSAY OF CALCIUM: CPT | Performed by: FAMILY MEDICINE

## 2023-04-25 PROCEDURE — 36415 COLL VENOUS BLD VENIPUNCTURE: CPT | Performed by: FAMILY MEDICINE

## 2023-04-25 NOTE — PROGRESS NOTES
Venipuncture performed in L ARM by RT Jeannie  with good hemostasis. Patient tolerated well. 04/25/23 Charisma Villegas, RT

## 2023-04-26 LAB — CALCIUM SPEC-SCNC: 9.5 MG/DL (ref 8.6–10.5)

## 2023-04-26 RX ORDER — DENOSUMAB 60 MG/ML
60 INJECTION SUBCUTANEOUS
Qty: 180 ML | Refills: 1 | Status: SHIPPED | OUTPATIENT
Start: 2023-04-26

## 2023-04-27 ENCOUNTER — HOSPITAL ENCOUNTER (OUTPATIENT)
Dept: ONCOLOGY | Facility: HOSPITAL | Age: 73
Discharge: HOME | End: 2023-04-27
Payer: MEDICARE

## 2023-04-27 VITALS — SYSTOLIC BLOOD PRESSURE: 121 MMHG | DIASTOLIC BLOOD PRESSURE: 81 MMHG

## 2023-04-27 DIAGNOSIS — M81.0 SENILE OSTEOPOROSIS: Primary | ICD-10-CM

## 2023-04-27 PROCEDURE — 96372 THER/PROPH/DIAG INJ SC/IM: CPT

## 2023-04-27 PROCEDURE — 25010000002 DENOSUMAB 60 MG/ML SOLUTION PREFILLED SYRINGE: Performed by: FAMILY MEDICINE

## 2023-04-27 RX ADMIN — DENOSUMAB 60 MG: 60 INJECTION SUBCUTANEOUS at 14:49

## 2023-04-27 NOTE — PROGRESS NOTES
Pt to the clinic for Prolia injection.  Pt reports to taking Calcium and Vit d.  Denies having any upcoming dental work.  Injection given and tolerated well. AVS given prior to discharge.

## 2023-05-09 ENCOUNTER — HOSPITAL ENCOUNTER (OUTPATIENT)
Dept: MAMMOGRAPHY | Facility: HOSPITAL | Age: 73
Discharge: HOME | End: 2023-05-09
Payer: MEDICARE

## 2023-05-09 ENCOUNTER — HOSPITAL ENCOUNTER (OUTPATIENT)
Dept: BONE DENSITY | Facility: HOSPITAL | Age: 73
Discharge: HOME | End: 2023-05-09
Payer: MEDICARE

## 2023-05-09 DIAGNOSIS — M81.0 SENILE OSTEOPOROSIS: ICD-10-CM

## 2023-05-09 DIAGNOSIS — Z12.31 ENCOUNTER FOR SCREENING MAMMOGRAM FOR BREAST CANCER: ICD-10-CM

## 2023-05-09 PROCEDURE — 77063 BREAST TOMOSYNTHESIS BI: CPT

## 2023-05-09 PROCEDURE — 77067 SCR MAMMO BI INCL CAD: CPT

## 2023-05-09 PROCEDURE — 77080 DXA BONE DENSITY AXIAL: CPT

## 2023-05-15 ENCOUNTER — TELEPHONE (OUTPATIENT)
Dept: FAMILY MEDICINE CLINIC | Facility: CLINIC | Age: 73
End: 2023-05-15
Payer: MEDICARE

## 2023-05-15 RX ORDER — IPRATROPIUM BROMIDE AND ALBUTEROL SULFATE 2.5; .5 MG/3ML; MG/3ML
SOLUTION RESPIRATORY (INHALATION)
Qty: 360 ML | Refills: 1 | Status: SHIPPED | OUTPATIENT
Start: 2023-05-15 | End: 2023-05-15 | Stop reason: SDUPTHER

## 2023-05-15 RX ORDER — IPRATROPIUM BROMIDE AND ALBUTEROL SULFATE 2.5; .5 MG/3ML; MG/3ML
3 SOLUTION RESPIRATORY (INHALATION) 4 TIMES DAILY PRN
Qty: 360 ML | Refills: 1 | Status: SHIPPED | OUTPATIENT
Start: 2023-05-15

## 2023-05-15 NOTE — TELEPHONE ENCOUNTER
-ALBUT SOLUTION  PRESCRIBED AND RYAN W/NESTOR PHARMACYL  NEEDS A DIAGNOSIS CODE TO BILL [facility] PLEASE ADVISE.

## 2023-05-19 DIAGNOSIS — M53.3 SACROILIAC JOINT PAIN: ICD-10-CM

## 2023-05-19 RX ORDER — HYDROCODONE BITARTRATE AND ACETAMINOPHEN 7.5; 325 MG/1; MG/1
1 TABLET ORAL EVERY 6 HOURS PRN
Qty: 120 TABLET | Refills: 0 | Status: SHIPPED | OUTPATIENT
Start: 2023-05-19

## 2023-05-19 NOTE — TELEPHONE ENCOUNTER
Caller: Elmo Eva    Relationship: Self    Best call back number: [phone] (Home)    Requested Prescriptions:   Requested Prescriptions     Pending Prescriptions Disp Refills   • HYDROcodone-acetaminophen (Norco) 7.5-325 MG per tablet 120 tablet 0     Sig: Take 1 tablet by mouth Every 6 (Six) Hours As Needed for Severe Pain.        Pharmacy where request should be sent: [facility] [id number] [place] [address] [address] [place] [address] - [phone] [facility] [phone]      Last office visit with prescribing clinician: 1/31/2023   Last telemedicine visit with prescribing clinician: 5/15/2023   Next office visit with prescribing clinician: Visit date not found     Additional details provided by patient:     Does the patient have less than a 3 day supply:  [x] Yes  [] No    Would you like a call back once the refill request has been completed: [] Yes [] No    If the office needs to give you a call back, can they leave a voicemail: [] Yes [] No    Bryce Hollins Rep   05/19/23 15:27 EDT

## 2023-05-19 NOTE — TELEPHONE ENCOUNTER
INSPECT RAN    Rx Refill Note  Requested Prescriptions     Pending Prescriptions Disp Refills   • HYDROcodone-acetaminophen (Norco) 7.5-325 MG per tablet 120 tablet 0     Sig: Take 1 tablet by mouth Every 6 (Six) Hours As Needed for Severe Pain.      Last office visit with prescribing clinician: 1/31/2023   Last telemedicine visit with prescribing clinician: 5/15/2023   Next office visit with prescribing clinician: Visit date not found                         Would you like a call back once the refill request has been completed: [] Yes [] No    If the office needs to give you a call back, can they leave a voicemail: [] Yes [] No    Charisma Villegas, RT  05/19/23, 15:56 EDT

## 2023-08-08 ENCOUNTER — OFFICE VISIT (OUTPATIENT)
Dept: CARDIOLOGY | Facility: CLINIC | Age: 73
End: 2023-08-08
Payer: MEDICARE

## 2023-08-08 VITALS
WEIGHT: 128 LBS | HEIGHT: 63 IN | BODY MASS INDEX: 22.68 KG/M2 | DIASTOLIC BLOOD PRESSURE: 84 MMHG | HEART RATE: 117 BPM | RESPIRATION RATE: 18 BRPM | SYSTOLIC BLOOD PRESSURE: 123 MMHG

## 2023-08-08 DIAGNOSIS — Z13.220 SCREENING FOR CHOLESTEROL LEVEL: ICD-10-CM

## 2023-08-08 DIAGNOSIS — R06.09 EXERTIONAL DYSPNEA: Primary | ICD-10-CM

## 2023-08-08 DIAGNOSIS — E78.49 OTHER HYPERLIPIDEMIA: ICD-10-CM

## 2023-08-08 DIAGNOSIS — R94.31 ABNORMAL EKG: ICD-10-CM

## 2023-08-08 PROCEDURE — 93000 ELECTROCARDIOGRAM COMPLETE: CPT | Performed by: INTERNAL MEDICINE

## 2023-08-08 PROCEDURE — 99204 OFFICE O/P NEW MOD 45 MIN: CPT | Performed by: INTERNAL MEDICINE

## 2023-08-08 NOTE — PROGRESS NOTES
"Cardiology Clinic Note  Ramirez Kevin MD, PhD    Subjective:     Encounter Date:08/08/2023      Patient ID: Eva Borrego is a 73 y.o. female.    Chief Complaint:  Chief Complaint   Patient presents with    Palpitations    Shortness of Breath       HPI:  I the pleasure to see this 73-year-old female as a new patient today referred for palpitations and dyspnea on exertion.  She has advanced panlobular emphysema, long smoking history on multiple bronchodilators.  Her resting heart rate is in the 110s, EKG is abnormal today with sinus tachycardia, right axis deviation, possible Q waves in the inferior leads possible old inferior MI which would be silent in the past.  She has not had any ischemic evaluation or 2D echo and recent memory she says.  She has lower extremity edema of 1-2+ from the shins to the ankles, NYHA class III shortness of breath no unstable angina.  I discussed her abnormal EKG, symptoms, peripheral edema on physical exam which she is amenable for testing today.  She seems somewhat surprised by findings of EKG physical exam and constellation of symptoms that we discussed today which could point to cardiac abnormality    Review of systems otherwise negative x 14 point review of systems except as mentioned above      Historical data copied forward from previous encounters in EMR including the history, exam, and assessment/plan has been reviewed and is unchanged unless noted otherwise.    Cardiac medicines reviewed with risk, benefits, and necessity of each discussed.    Risk and benefit of cardiac testing reviewed including death heart attack stroke pain bleeding infection need for vascular /cardiovascular surgery were discussed and the patient     Objective:         /84 (BP Location: Left arm, Patient Position: Sitting)   Pulse 117   Resp 18   Ht 160 cm (63\")   Wt 58.1 kg (128 lb)   BMI 22.67 kg/mý   Tachycardic regular no rubs gallops heave or lift, 1 out of 6 stock ejection murmur " sternal border  No carotid bruits  Mild JVD HJR  Abdomen soft nontender nondistended  No clubbing cyanosis with 2+ edema to the shins pitting  Intact grossly  Skin is warm and dry  Radial pulses 1+ equal bilaterally with normal cap refill      Assessment and plan per my encounter  Abnormal EKG  Shortness of air dyspnea on exertion  Peripheral edema  NYHA class III shortness of air possible CHF  Resting tachycardia    2D echo for structural and functional evaluation with abnormal EKG  Dyspnea exertion, Lexiscan stress for risk stratification  Consider calcium scoring  Fasting lipid panels, CMP CBC TSH UA for evaluation of the above    Return to clinic in 2 to 3 weeks  Go to the ER for any unstable symptoms of chest pain shortness of breath or otherwise    The pleasure to be involved in this patient's cardiovascular care.  Please call with any questions or concerns  Ramirez Kevin MD, PhD    Most recent EKG as reviewed and interpreted by me:    ECG 12 Lead    Date/Time: 8/8/2023 3:10 PM  Performed by: Ramirez Kevin MD  Authorized by: Ramirez Kevin MD   Comparison: not compared with previous ECG   Previous ECG: no previous ECG available  Rhythm: sinus tachycardia  Ectopy: unifocal PVCs  QRS axis: right and extreme  Other findings: non-specific ST-T wave changes  Comments: Possible old inferior MI  Possible pulmonary disease         Most recent echo as reviewed and interpreted by me:      Most recent stress test as reviewed and interpreted by me:      Most recent cardiac catheterization as reviewed interpreted by me:  No results found for this or any previous visit.    The following portions of the patient's history were reviewed and updated as appropriate: allergies, current medications, past family history, past medical history, past social history, past surgical history, and problem list.      ROS:  14 point review of systems negative except as mentioned above    Current Outpatient Medications:      albuterol sulfate HFA (Ventolin HFA) 108 (90 Base) MCG/ACT inhaler, Inhale 2 puffs Every 6 (Six) Hours As Needed for Wheezing or Shortness of Air., Disp: 18 g, Rfl: 0    ALLEGRA-D ALLERGY & CONGESTION  MG per 12 hr tablet, TAKE ONE TABLET BY MOUTH TWICE A DAY AS NEEDED FOR ALLERGIES (Patient taking differently: Take 1 tablet by mouth Daily.), Disp: 60 tablet, Rfl: 0    Budeson-Glycopyrrol-Formoterol (Breztri Aerosphere) 160-9-4.8 MCG/ACT aerosol inhaler, Inhale 2 puffs 2 (Two) Times a Day., Disp: 10.7 g, Rfl: 0    [employer] IBUPROFEN PO, PRN, Disp: , Rfl:     denosumab (Prolia) 60 MG/ML solution prefilled syringe syringe, Inject 1 mL under the skin into the appropriate area as directed Every 6 (Six) Months., Disp: 180 mL, Rfl: 1    docusate sodium (COLACE) 50 MG capsule, Take 1 capsule by mouth Daily., Disp: 30 capsule, Rfl:     HYDROcodone-acetaminophen (Norco) 7.5-325 MG per tablet, Take 1 tablet by mouth Every 6 (Six) Hours As Needed for Severe Pain., Disp: 120 tablet, Rfl: 0    hydrocortisone 2.5 % cream, Apply 1 application  topically to the appropriate area as directed 2 (Two) Times a Day As Needed for Irritation., Disp: 28 g, Rfl: 0    ipratropium-albuterol (DUO-NEB) 0.5-2.5 mg/3 ml nebulizer, Take 3 mL by nebulization 4 (Four) Times a Day As Needed for Wheezing or Shortness of Air., Disp: 360 mL, Rfl: 1    levothyroxine (SYNTHROID, LEVOTHROID) 100 MCG tablet, Take 1 tablet by mouth Daily., Disp: 90 tablet, Rfl: 2    multivitamin with minerals tablet tablet, Take 1 tablet by mouth Daily., Disp: , Rfl:     omeprazole (priLOSEC) 20 MG capsule, Take 1 capsule by mouth Daily., Disp: 90 capsule, Rfl: 1    simvastatin (ZOCOR) 10 MG tablet, Take 1 tablet by mouth Every Night., Disp: 90 tablet, Rfl: 3    vitamin D3 125 MCG (5000 UT) capsule capsule, Take 1 capsule by mouth Every Other Day. 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    Sacroiliac joint pain    Status post revision of total hip replacement    Telangiectasia    Vertigo    Chronic combined systolic (congestive) and diastolic (congestive) heart failure    Mass of hand    Hypothyroidism    Senile osteoporosis    Left elbow pain    Acute pharyngitis     Past Medical History:  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    Post-menopausal     Sacroiliac joint pain     Seasonal allergies     Telangiectasia      Past Surgical History:  Past Surgical History:   Procedure Laterality Date    CHOLECYSTECTOMY      COLONOSCOPY      TA = 2018/ 2021, rech 2024             GSI    EYE SURGERY      B/L Cataract/ Lens Lt    JOINT REPLACEMENT Right     THR---2002/ 2017    MASS EXCISION Left 10/11/2022    Procedure: EXCISION MASS UPPER EXTREMITY, ELBOW;  Surgeon: Jeremy Trinh MD;  Location: [facility] MAIN OR;  Service: Orthopedics;  Laterality: Left;    SPINE SURGERY      Discectomy Lumbar    TONSILLECTOMY       Social History:  Social History     Socioeconomic History    Marital status:    Tobacco Use    Smoking status: Former     Packs/day: 0.50     Years: 40.00     Pack years: 20.00     Types: Cigarettes     Start date: 1969     Quit date: 10/11/2012     Years since quitting: 10.8      Passive exposure: Never    Smokeless tobacco: Never    Tobacco comments:     Vapes 3mL-8   Vaping Use    Vaping Use: Every day    Substances: Flavoring, menthol 12 mg    Devices: Refillable tank   Substance and Sexual Activity    Alcohol use: No    Drug use: No    Sexual activity: Defer     Allergies:  Allergies   Allergen Reactions    Erythromycin Anaphylaxis    Celebrex [Celecoxib] Diarrhea    Percocet [Oxycodone-Acetaminophen] Nausea Only and Dizziness     Immunizations:  Immunization History   Administered Date(s) Administered    COVID-19 (PFIZER) Purple Cap Monovalent 01/28/2021, 02/18/2021, 11/19/2021    FLUAD TRI 65YR+ 09/20/2019    Fluad Quad 65+ 09/20/2019, 10/07/2020    Fluzone High Dose =>65 Years ([employer] ONLY) 09/21/2016, 09/04/2018    Fluzone High-Dose 65+yrs 02/10/2022, 10/28/2022    Influenza TIV (IM) 09/16/2011, 11/04/2012, 10/15/2013, 11/05/2014, 11/05/2015, 10/20/2017    Pneumococcal Conjugate 13-Valent (PCV13) 07/22/2021    Pneumococcal Polysaccharide (PPSV23) 10/12/2018    Td (TDVAX) 07/06/2011            In-Office Procedure(s):  No orders to display        ASCVD RIsk Score::  The 10-year ASCVD risk score (Dorothy CARLISLE, et al., 2019) is: 11.7%    Values used to calculate the score:      Age: 73 years      Sex: Female      Is Non- : No      Diabetic: No      Tobacco smoker: No      Systolic Blood Pressure: 123 mmHg      Is BP treated: No      HDL Cholesterol: 50 mg/dL      Total Cholesterol: 156 mg/dL    Imaging:    Results for orders placed during the hospital encounter of 10/07/22    XR Shoulder 2+ View Left    Narrative  DATE OF EXAM:  10/7/2022 10:53 AM    PROCEDURE:  XR SHOULDER 2+ VW LEFT-    INDICATIONS:  Evaluation of the elbow prior to resection of bony mass; M25.522-Pain in  left elbow    COMPARISON:  No Comparisons Available    TECHNIQUE:  A minimum of two radiologic views of the left shoulder were obtained.      FINDINGS:  Glenohumeral joint and humeral head have  a normal appearance. AC joint  and subacromial space within normal limits. No soft tissue  calcifications    Impression  Unremarkable left shoulder    Electronically Signed By-Hardik Cool On:10/7/2022 11:05 AM  This report was finalized on [id number] by  Hardik Cool, .       Results for orders placed during the hospital encounter of 11/17/20    CT Chest Low Dose Wo    Narrative  DATE OF EXAM:  11/17/2020 2:11 PM    PROCEDURE:  CT CHEST LOW DOSE WO-    INDICATIONS:  Lung cancer annual screening, asymptomatic, smoker hx w/in last 15 yrs  (min. 30 pack-yrs)    COMPARISON:  11/12/2019    TECHNIQUE:  Low dose continuous axial images obtained of the chest according to  routine low-dose lung cancer screening CT. Coronal and sagittal  reconstructions were performed. Automated exposure control and iterative  reconstruction was utilized for reduction of CT dose.    FINDINGS:  There are moderate emphysematous changes in the lungs. There is a new 3  mm pulmonary nodule in the right upper lobe. The previously described  pulmonary nodule right lower lobe is stable to smaller in size measuring  3 mm in diameter on the current study. Calcified granulomas are present  in the right upper lobe with calcifications in the right hilum. The  ascending aorta has a maximal diameter 4.1 cm and has not changed. There  is coronary atherosclerotic calcifications. There is no significant  lymphadenopathy. Additional small nodules are present in the right lower  lobe which are unchanged in size from the patient's previous exam.    Impression  1. Lung RADS category two benign findings. A one-year low-dose chest CT  is recommended.  2. Stable 4.1 cm descending aortic aneurysm.  3. Moderate emphysematous lung disease    Electronically Signed By-Naman Day MD On:11/17/2020 2:24 PM  This report was finalized on [id number] by  Naman Day MD.      Results for orders placed during the hospital encounter of 11/17/20    CT Chest Low Dose  Wo    Narrative  DATE OF EXAM:  11/17/2020 2:11 PM    PROCEDURE:  CT CHEST LOW DOSE WO-    INDICATIONS:  Lung cancer annual screening, asymptomatic, smoker hx w/in last 15 yrs  (min. 30 pack-yrs)    COMPARISON:  11/12/2019    TECHNIQUE:  Low dose continuous axial images obtained of the chest according to  routine low-dose lung cancer screening CT. Coronal and sagittal  reconstructions were performed. Automated exposure control and iterative  reconstruction was utilized for reduction of CT dose.    FINDINGS:  There are moderate emphysematous changes in the lungs. There is a new 3  mm pulmonary nodule in the right upper lobe. The previously described  pulmonary nodule right lower lobe is stable to smaller in size measuring  3 mm in diameter on the current study. Calcified granulomas are present  in the right upper lobe with calcifications in the right hilum. The  ascending aorta has a maximal diameter 4.1 cm and has not changed. There  is coronary atherosclerotic calcifications. There is no significant  lymphadenopathy. Additional small nodules are present in the right lower  lobe which are unchanged in size from the patient's previous exam.    Impression  1. Lung RADS category two benign findings. A one-year low-dose chest CT  is recommended.  2. Stable 4.1 cm descending aortic aneurysm.  3. Moderate emphysematous lung disease    Electronically Signed By-Naman Day MD On:11/17/2020 2:24 PM  This report was finalized on [id number] by  Naman Day MD.      Lab Review:   Clinical Support on 07/14/2023   Component Date Value    Total Cholesterol 07/14/2023 156     Triglycerides 07/14/2023 88     HDL Cholesterol 07/14/2023 50     LDL Cholesterol  07/14/2023 89     VLDL Cholesterol 07/14/2023 17     LDL/HDL Ratio 07/14/2023 1.77     Glucose 07/14/2023 102 (H)     BUN 07/14/2023 10     Creatinine 07/14/2023 0.53 (L)     Sodium 07/14/2023 142     Potassium 07/14/2023 3.9     Chloride 07/14/2023 102     CO2 07/14/2023  28.8     Calcium 07/14/2023 9.6     Total Protein 07/14/2023 6.9     Albumin 07/14/2023 4.2     ALT (SGPT) 07/14/2023 13     AST (SGOT) 07/14/2023 22     Alkaline Phosphatase 07/14/2023 56     Total Bilirubin 07/14/2023 0.3     Globulin 07/14/2023 2.7     A/G Ratio 07/14/2023 1.6     BUN/Creatinine Ratio 07/14/2023 18.9     Anion Gap 07/14/2023 11.2     eGFR 07/14/2023 97.8     Free T4 07/14/2023 1.49     TSH 07/14/2023 0.736     25 Hydroxy, Vitamin D 07/14/2023 61.7    Clinical Support on 04/25/2023   Component Date Value    Calcium 04/25/2023 9.5      Recent labs reviewed and interpreted for clinical significance and application            Level of Care:           Ramirez Kevin MD  08/08/23  .

## 2023-08-16 ENCOUNTER — HOSPITAL ENCOUNTER (OUTPATIENT)
Dept: CARDIOLOGY | Facility: HOSPITAL | Age: 73
Discharge: HOME | End: 2023-08-16
Admitting: INTERNAL MEDICINE
Payer: MEDICARE

## 2023-08-16 VITALS — HEIGHT: 63 IN | WEIGHT: 128 LBS | BODY MASS INDEX: 22.68 KG/M2

## 2023-08-16 DIAGNOSIS — R06.09 EXERTIONAL DYSPNEA: ICD-10-CM

## 2023-08-16 DIAGNOSIS — R94.31 ABNORMAL EKG: ICD-10-CM

## 2023-08-16 PROCEDURE — 93306 TTE W/DOPPLER COMPLETE: CPT | Performed by: INTERNAL MEDICINE

## 2023-08-16 PROCEDURE — 93306 TTE W/DOPPLER COMPLETE: CPT

## 2023-08-17 LAB
BH CV ECHO MEAS - ACS: 2.29 CM
BH CV ECHO MEAS - AO MAX PG: 2.1 MMHG
BH CV ECHO MEAS - AO MEAN PG: 1.21 MMHG
BH CV ECHO MEAS - AO ROOT DIAM: 3.6 CM
BH CV ECHO MEAS - AO V2 MAX: 72.4 CM/SEC
BH CV ECHO MEAS - AO V2 VTI: 10.6 CM
BH CV ECHO MEAS - AVA(I,D): 3.7 CM2
BH CV ECHO MEAS - EDV(CUBED): 34.8 ML
BH CV ECHO MEAS - EDV(MOD-SP4): 62.5 ML
BH CV ECHO MEAS - EF(MOD-BP): 54.5 %
BH CV ECHO MEAS - EF(MOD-SP4): 54.5 %
BH CV ECHO MEAS - ESV(CUBED): 13.3 ML
BH CV ECHO MEAS - ESV(MOD-SP4): 28.4 ML
BH CV ECHO MEAS - FS: 27.5 %
BH CV ECHO MEAS - IVS/LVPW: 1 CM
BH CV ECHO MEAS - IVSD: 0.95 CM
BH CV ECHO MEAS - LA DIMENSION: 3.3 CM
BH CV ECHO MEAS - LV DIASTOLIC VOL/BSA (35-75): 39 CM2
BH CV ECHO MEAS - LV MASS(C)D: 86.9 GRAMS
BH CV ECHO MEAS - LV MAX PG: 1.85 MMHG
BH CV ECHO MEAS - LV MEAN PG: 0.97 MMHG
BH CV ECHO MEAS - LV SYSTOLIC VOL/BSA (12-30): 17.8 CM2
BH CV ECHO MEAS - LV V1 MAX: 67.9 CM/SEC
BH CV ECHO MEAS - LV V1 VTI: 10.1 CM
BH CV ECHO MEAS - LVIDD: 3.3 CM
BH CV ECHO MEAS - LVIDS: 2.37 CM
BH CV ECHO MEAS - LVOT AREA: 3.9 CM2
BH CV ECHO MEAS - LVOT DIAM: 2.22 CM
BH CV ECHO MEAS - LVPWD: 0.96 CM
BH CV ECHO MEAS - MR MAX PG: 65.9 MMHG
BH CV ECHO MEAS - MR MAX VEL: 405.8 CM/SEC
BH CV ECHO MEAS - MV A MAX VEL: 84.2 CM/SEC
BH CV ECHO MEAS - MV DEC SLOPE: 265.3 CM/SEC2
BH CV ECHO MEAS - MV DEC TIME: 0.19 MSEC
BH CV ECHO MEAS - MV E MAX VEL: 50.3 CM/SEC
BH CV ECHO MEAS - MV E/A: 0.6
BH CV ECHO MEAS - MV MAX PG: 3.4 MMHG
BH CV ECHO MEAS - MV MEAN PG: 1.42 MMHG
BH CV ECHO MEAS - MV V2 VTI: 15.6 CM
BH CV ECHO MEAS - MVA(VTI): 2.49 CM2
BH CV ECHO MEAS - PA ACC TIME: 0.14 SEC
BH CV ECHO MEAS - PA V2 MAX: 69.4 CM/SEC
BH CV ECHO MEAS - PI END-D VEL: 88.7 CM/SEC
BH CV ECHO MEAS - PULM DIAS VEL: 61.9 CM/SEC
BH CV ECHO MEAS - PULM S/D: 0.59
BH CV ECHO MEAS - PULM SYS VEL: 36.2 CM/SEC
BH CV ECHO MEAS - RV MAX PG: 1.38 MMHG
BH CV ECHO MEAS - RV V1 MAX: 58.6 CM/SEC
BH CV ECHO MEAS - RV V1 VTI: 8.8 CM
BH CV ECHO MEAS - RVDD: 2.48 CM
BH CV ECHO MEAS - SI(MOD-SP4): 21.3 ML/M2
BH CV ECHO MEAS - SV(LVOT): 39 ML
BH CV ECHO MEAS - SV(MOD-SP4): 34 ML

## 2023-08-21 ENCOUNTER — HOSPITAL ENCOUNTER (OUTPATIENT)
Dept: NUCLEAR MEDICINE | Facility: HOSPITAL | Age: 73
Discharge: HOME | End: 2023-08-21
Payer: MEDICARE

## 2023-08-21 ENCOUNTER — LAB (OUTPATIENT)
Dept: LAB | Facility: HOSPITAL | Age: 73
End: 2023-08-21
Payer: MEDICARE

## 2023-08-21 DIAGNOSIS — R06.09 EXERTIONAL DYSPNEA: ICD-10-CM

## 2023-08-21 DIAGNOSIS — R94.31 ABNORMAL EKG: ICD-10-CM

## 2023-08-21 DIAGNOSIS — E78.49 OTHER HYPERLIPIDEMIA: ICD-10-CM

## 2023-08-21 DIAGNOSIS — Z13.220 SCREENING FOR CHOLESTEROL LEVEL: ICD-10-CM

## 2023-08-21 LAB
ALBUMIN SERPL-MCNC: 5 G/DL (ref 3.5–5.2)
ALBUMIN/GLOB SERPL: 2 G/DL
ALP SERPL-CCNC: 66 U/L (ref 39–117)
ALT SERPL W P-5'-P-CCNC: 15 U/L (ref 1–33)
ANION GAP SERPL CALCULATED.3IONS-SCNC: 10 MMOL/L (ref 5–15)
AST SERPL-CCNC: 21 U/L (ref 1–32)
BACTERIA UR QL AUTO: NORMAL /HPF
BASOPHILS # BLD AUTO: 0.04 10*3/MM3 (ref 0–0.2)
BASOPHILS NFR BLD AUTO: 0.5 % (ref 0–1.5)
BH CV REST NUCLEAR ISOTOPE DOSE: 11 MCI
BH CV STRESS BP STAGE 1: NORMAL
BH CV STRESS BP STAGE 2: NORMAL
BH CV STRESS BP STAGE 3: NORMAL
BH CV STRESS COMMENTS STAGE 1: NORMAL
BH CV STRESS COMMENTS STAGE 2: NORMAL
BH CV STRESS DOSE REGADENOSON STAGE 1: 0.4
BH CV STRESS DURATION MIN STAGE 1: 0
BH CV STRESS DURATION MIN STAGE 2: 4
BH CV STRESS DURATION SEC STAGE 1: 10
BH CV STRESS DURATION SEC STAGE 2: 0
BH CV STRESS HR STAGE 1: 108
BH CV STRESS HR STAGE 2: 109
BH CV STRESS HR STAGE 3: 108
BH CV STRESS NUCLEAR ISOTOPE DOSE: 33 MCI
BH CV STRESS PROTOCOL 1: NORMAL
BH CV STRESS RECOVERY BP: NORMAL MMHG
BH CV STRESS RECOVERY HR: 107 BPM
BH CV STRESS STAGE 1: 1
BH CV STRESS STAGE 2: 2
BH CV STRESS STAGE 3: 3
BILIRUB SERPL-MCNC: 0.4 MG/DL (ref 0–1.2)
BILIRUB UR QL STRIP: NEGATIVE
BUN SERPL-MCNC: 13 MG/DL (ref 8–23)
BUN/CREAT SERPL: 24.5 (ref 7–25)
CALCIUM SPEC-SCNC: 10.8 MG/DL (ref 8.6–10.5)
CHLORIDE SERPL-SCNC: 101 MMOL/L (ref 98–107)
CHOLEST SERPL-MCNC: 168 MG/DL (ref 0–200)
CLARITY UR: CLEAR
CO2 SERPL-SCNC: 30 MMOL/L (ref 22–29)
COLOR UR: YELLOW
CREAT SERPL-MCNC: 0.53 MG/DL (ref 0.57–1)
DEPRECATED RDW RBC AUTO: 41.8 FL (ref 37–54)
EGFRCR SERPLBLD CKD-EPI 2021: 97.8 ML/MIN/1.73
EOSINOPHIL # BLD AUTO: 0.07 10*3/MM3 (ref 0–0.4)
EOSINOPHIL NFR BLD AUTO: 0.8 % (ref 0.3–6.2)
ERYTHROCYTE [DISTWIDTH] IN BLOOD BY AUTOMATED COUNT: 12.3 % (ref 12.3–15.4)
GLOBULIN UR ELPH-MCNC: 2.5 GM/DL
GLUCOSE SERPL-MCNC: 116 MG/DL (ref 65–99)
GLUCOSE UR STRIP-MCNC: NEGATIVE MG/DL
HCT VFR BLD AUTO: 44.5 % (ref 34–46.6)
HDLC SERPL-MCNC: 60 MG/DL (ref 40–60)
HGB BLD-MCNC: 14.8 G/DL (ref 12–15.9)
HGB UR QL STRIP.AUTO: NEGATIVE
HYALINE CASTS UR QL AUTO: NORMAL /LPF
IMM GRANULOCYTES # BLD AUTO: 0.03 10*3/MM3 (ref 0–0.05)
IMM GRANULOCYTES NFR BLD AUTO: 0.3 % (ref 0–0.5)
KETONES UR QL STRIP: NEGATIVE
LDLC SERPL CALC-MCNC: 92 MG/DL (ref 0–100)
LDLC/HDLC SERPL: 1.52 {RATIO}
LEUKOCYTE ESTERASE UR QL STRIP.AUTO: NEGATIVE
LV EF NUC BP: 67 %
LYMPHOCYTES # BLD AUTO: 1.41 10*3/MM3 (ref 0.7–3.1)
LYMPHOCYTES NFR BLD AUTO: 15.9 % (ref 19.6–45.3)
MAXIMAL PREDICTED HEART RATE: 147 BPM
MCH RBC QN AUTO: 30.8 PG (ref 26.6–33)
MCHC RBC AUTO-ENTMCNC: 33.3 G/DL (ref 31.5–35.7)
MCV RBC AUTO: 92.7 FL (ref 79–97)
MONOCYTES # BLD AUTO: 0.62 10*3/MM3 (ref 0.1–0.9)
MONOCYTES NFR BLD AUTO: 7 % (ref 5–12)
NEUTROPHILS NFR BLD AUTO: 6.69 10*3/MM3 (ref 1.7–7)
NEUTROPHILS NFR BLD AUTO: 75.5 % (ref 42.7–76)
NITRITE UR QL STRIP: NEGATIVE
NRBC BLD AUTO-RTO: 0 /100 WBC (ref 0–0.2)
PERCENT MAX PREDICTED HR: 74.15 %
PH UR STRIP.AUTO: 6.5 [PH] (ref 5–8)
PLATELET # BLD AUTO: 281 10*3/MM3 (ref 140–450)
PMV BLD AUTO: 10.2 FL (ref 6–12)
POTASSIUM SERPL-SCNC: 4.6 MMOL/L (ref 3.5–5.2)
PROT SERPL-MCNC: 7.5 G/DL (ref 6–8.5)
PROT UR QL STRIP: NEGATIVE
RBC # BLD AUTO: 4.8 10*6/MM3 (ref 3.77–5.28)
RBC # UR STRIP: NORMAL /HPF
REF LAB TEST METHOD: NORMAL
SODIUM SERPL-SCNC: 141 MMOL/L (ref 136–145)
SP GR UR STRIP: 1.01 (ref 1–1.03)
SQUAMOUS #/AREA URNS HPF: NORMAL /HPF
STRESS BASELINE BP: NORMAL MMHG
STRESS BASELINE HR: 98 BPM
STRESS PERCENT HR: 87 %
STRESS POST PEAK BP: NORMAL MMHG
STRESS POST PEAK HR: 109 BPM
STRESS TARGET HR: 125 BPM
TRIGL SERPL-MCNC: 84 MG/DL (ref 0–150)
TSH SERPL DL<=0.05 MIU/L-ACNC: 0.51 UIU/ML (ref 0.27–4.2)
UROBILINOGEN UR QL STRIP: NORMAL
VLDLC SERPL-MCNC: 16 MG/DL (ref 5–40)
WBC # UR STRIP: NORMAL /HPF
WBC NRBC COR # BLD: 8.86 10*3/MM3 (ref 3.4–10.8)

## 2023-08-21 PROCEDURE — 25010000002 REGADENOSON 0.4 MG/5ML SOLUTION: Performed by: INTERNAL MEDICINE

## 2023-08-21 PROCEDURE — 80061 LIPID PANEL: CPT

## 2023-08-21 PROCEDURE — 36415 COLL VENOUS BLD VENIPUNCTURE: CPT

## 2023-08-21 PROCEDURE — A9502 TC99M TETROFOSMIN: HCPCS | Performed by: INTERNAL MEDICINE

## 2023-08-21 PROCEDURE — 78452 HT MUSCLE IMAGE SPECT MULT: CPT | Performed by: INTERNAL MEDICINE

## 2023-08-21 PROCEDURE — 93018 CV STRESS TEST I&R ONLY: CPT | Performed by: INTERNAL MEDICINE

## 2023-08-21 PROCEDURE — 0 TECHNETIUM TETROFOSMIN KIT: Performed by: INTERNAL MEDICINE

## 2023-08-21 PROCEDURE — 93016 CV STRESS TEST SUPVJ ONLY: CPT | Performed by: INTERNAL MEDICINE

## 2023-08-21 PROCEDURE — 93017 CV STRESS TEST TRACING ONLY: CPT

## 2023-08-21 PROCEDURE — 78452 HT MUSCLE IMAGE SPECT MULT: CPT

## 2023-08-21 PROCEDURE — 81001 URINALYSIS AUTO W/SCOPE: CPT

## 2023-08-21 PROCEDURE — 85025 COMPLETE CBC W/AUTO DIFF WBC: CPT

## 2023-08-21 PROCEDURE — 80053 COMPREHEN METABOLIC PANEL: CPT

## 2023-08-21 PROCEDURE — 84443 ASSAY THYROID STIM HORMONE: CPT

## 2023-08-21 RX ORDER — REGADENOSON 0.08 MG/ML
0.4 INJECTION, SOLUTION INTRAVENOUS
Status: COMPLETED | OUTPATIENT
Start: 2023-08-21 | End: 2023-08-21

## 2023-08-21 RX ADMIN — REGADENOSON 0.4 MG: 0.08 INJECTION, SOLUTION INTRAVENOUS at 12:59

## 2023-08-21 RX ADMIN — TETROFOSMIN 1 DOSE: 1.38 INJECTION, POWDER, LYOPHILIZED, FOR SOLUTION INTRAVENOUS at 12:59

## 2023-08-21 RX ADMIN — TETROFOSMIN 1 DOSE: 1.38 INJECTION, POWDER, LYOPHILIZED, FOR SOLUTION INTRAVENOUS at 11:47

## 2023-08-28 ENCOUNTER — OFFICE VISIT (OUTPATIENT)
Dept: CARDIOLOGY | Facility: CLINIC | Age: 73
End: 2023-08-28
Payer: MEDICARE

## 2023-08-28 VITALS
HEART RATE: 84 BPM | HEIGHT: 63 IN | SYSTOLIC BLOOD PRESSURE: 143 MMHG | WEIGHT: 126 LBS | BODY MASS INDEX: 22.32 KG/M2 | OXYGEN SATURATION: 95 % | RESPIRATION RATE: 18 BRPM | DIASTOLIC BLOOD PRESSURE: 95 MMHG

## 2023-08-28 DIAGNOSIS — R06.09 EXERTIONAL DYSPNEA: Primary | ICD-10-CM

## 2023-08-28 DIAGNOSIS — R94.31 ABNORMAL EKG: ICD-10-CM

## 2023-08-28 DIAGNOSIS — Z13.220 SCREENING FOR CHOLESTEROL LEVEL: ICD-10-CM

## 2023-08-28 PROCEDURE — 99214 OFFICE O/P EST MOD 30 MIN: CPT | Performed by: INTERNAL MEDICINE

## 2023-08-28 RX ORDER — SIMVASTATIN 20 MG
20 TABLET ORAL NIGHTLY
Qty: 90 TABLET | Refills: 3 | Status: SHIPPED | OUTPATIENT
Start: 2023-08-28

## 2023-08-30 DIAGNOSIS — M53.3 SACROILIAC JOINT PAIN: ICD-10-CM

## 2023-08-30 RX ORDER — HYDROCODONE BITARTRATE AND ACETAMINOPHEN 7.5; 325 MG/1; MG/1
1 TABLET ORAL EVERY 6 HOURS PRN
Qty: 120 TABLET | Refills: 0 | Status: SHIPPED | OUTPATIENT
Start: 2023-08-30

## 2023-08-30 NOTE — TELEPHONE ENCOUNTER
Caller: Eva Borrego    Relationship: Self    Best call back number: [phone]     Requested Prescriptions:   Requested Prescriptions     Pending Prescriptions Disp Refills    HYDROcodone-acetaminophen (Norco) 7.5-325 MG per tablet 120 tablet 0     Sig: Take 1 tablet by mouth Every 6 (Six) Hours As Needed for Severe Pain.        Pharmacy where request should be sent: [facility] [id number] [id number] AT [facility] [address] [place] [address] - [phone] [facility] [phone]      Last office visit with prescribing clinician: 7/18/2023   Last telemedicine visit with prescribing clinician: Visit date not found   Next office visit with prescribing clinician: 1/23/2024     Additional details provided by patient: PATIENT HAS 1 DAY SUPPLY LEFT     Does the patient have less than a 3 day supply:  [x] Yes  [] No    Would you like a call back once the refill request has been completed: [] Yes [x] No    If the office needs to give you a call back, can they leave a voicemail: [] Yes [x] No    Bryce Glaser Rep   08/30/23 11:34 EDT

## 2023-08-30 NOTE — TELEPHONE ENCOUNTER
Rx Refill Note  Requested Prescriptions     Pending Prescriptions Disp Refills    HYDROcodone-acetaminophen (Norco) 7.5-325 MG per tablet 120 tablet 0     Sig: Take 1 tablet by mouth Every 6 (Six) Hours As Needed for Severe Pain.      Last office visit with prescribing clinician: 7/18/2023   Last telemedicine visit with prescribing clinician: Visit date not found   Next office visit with prescribing clinician: 1/23/2024     CBC & Differential (08/21/2023 13:47)   Comprehensive Metabolic Panel (08/21/2023 13:47)   Lipid Panel (08/21/2023 13:47)                     Would you like a call back once the refill request has been completed: [] Yes [] No    If the office needs to give you a call back, can they leave a voicemail: [] Yes [] No    Radha Le CMA  08/30/23, 11:44 EDT

## 2023-08-31 ENCOUNTER — HOSPITAL ENCOUNTER (OUTPATIENT)
Dept: CT IMAGING | Facility: HOSPITAL | Age: 73
Discharge: HOME | End: 2023-08-31
Admitting: INTERNAL MEDICINE

## 2023-08-31 DIAGNOSIS — R06.09 EXERTIONAL DYSPNEA: ICD-10-CM

## 2023-08-31 DIAGNOSIS — R94.31 ABNORMAL EKG: ICD-10-CM

## 2023-08-31 PROCEDURE — 75571 CT HRT W/O DYE W/CA TEST: CPT

## 2023-09-13 ENCOUNTER — TELEPHONE (OUTPATIENT)
Dept: ORTHOPEDIC SURGERY | Facility: CLINIC | Age: 73
End: 2023-09-13
Payer: MEDICARE

## 2023-09-13 ENCOUNTER — TELEPHONE (OUTPATIENT)
Dept: CARDIOLOGY | Facility: CLINIC | Age: 73
End: 2023-09-13
Payer: MEDICARE

## 2023-09-13 NOTE — TELEPHONE ENCOUNTER
Caller: Eva Borrego    Relationship: Self    Best call back number: [phone]    PATIENT WAS RETURNING A CALL TO TAVIA, SHE STATES THE MESSAGE THAT WAS LEFT WAS NOT VERY CLEAR

## 2023-09-13 NOTE — TELEPHONE ENCOUNTER
Left vm to call back. CT calcium score elevated. Wanted to confirm she is taking simvastatin 20mg daily. Will repeat FLP in 3 months. Keep f/u in October.

## 2023-09-13 NOTE — TELEPHONE ENCOUNTER
Caller: JOSE     Relationship to patient: SELF    Best call back number: [phone]    Chief complaint: LEFT ARM     Type of visit: FOLLOW UP    Requested date: 9.20.23 , LATER TIME    If rescheduling, when is the original appointment:  9.20.23 11 AM

## 2023-09-13 NOTE — PROGRESS NOTES
Cardiology Clinic Note  Ramirez Kevin MD, PhD    Subjective:     Encounter Date:08/28/2023      Patient ID: Eva Borrego is a 73 y.o. female.    Chief Complaint:  Chief Complaint   Patient presents with    Follow-up       HPI:    I the pleasure to see this 73-year-old female as a new patient today referred for palpitations and dyspnea on exertion.  She has advanced panlobular emphysema, long smoking history on multiple bronchodilators.  Her resting heart rate is in the 110s, EKG is abnormal today with sinus tachycardia, right axis deviation, possible Q waves in the inferior leads possible old inferior MI which would be silent in the past.  She has not had any ischemic evaluation or 2D echo and recent memory she says.  She has lower extremity edema of 1-2+ from the shins to the ankles, NYHA class III shortness of breath no unstable angina.  I discussed her abnormal EKG, symptoms, peripheral edema on physical exam which she is amenable for testing today.  She seems somewhat surprised by findings of EKG physical exam and constellation of symptoms that we discussed today which could point to cardiac abnormality     Review of systems otherwise negative x 14 point review of systems except as mentioned above        Historical data copied forward from previous encounters in EMR including the history, exam, and assessment/plan has been reviewed and is unchanged unless noted otherwise.     Cardiac medicines reviewed with risk, benefits, and necessity of each discussed.     Risk and benefit of cardiac testing reviewed including death heart attack stroke pain bleeding infection need for vascular /cardiovascular surgery were discussed and the patient     Expand All Collapse All  Cardiology Clinic Note  Ramirez Kevin MD, PhD     Subjective:      Encounter Date:08/08/2023        Subjective     Patient ID: Eva Borrego is a 73 y.o. female.     Chief Complaint:      Chief Complaint   Patient presents with    Palpitations     Shortness of Breath         HPI:  I the pleasure to see this 73-year-old female  in follow-up referred for palpitations and dyspnea on exertion atypical chest pain.  She has advanced panlobular emphysema, long smoking history on multiple bronchodilators.  Her resting heart rate is in the 110s initially on encounter, EKG at that time abnormal with sinus tachycardia, right axis deviation, possible Q waves in the inferior leads possible old inferior MI which would be silent in the past.  She has lower extremity edema of 1-2+ from the shins to the ankles, NYHA class III shortness of breath no unstable angina symptoms.  I discussed her abnormal EKG, symptoms, peripheral edema on physical exam which she is amenable for testing  previously which was accomplished as below.  She seems somewhat surprised by findings of EKG physical exam and constellation of symptoms that we discussed today which could point to cardiac abnormality      Testing was performed.  Intermediate risk studies, moderate risk calcium score, preserved EF by 2D echo.  Patient elects for continued primary and secondary prevention goals, we discussed if she has chest discomfort or further dyspnea on exertion, left trach catheterization would be warranted for definitive evaluation an IFR guided approach for any suspect CAD given abnormal calcium score of mostly a in the left main as well as LAD     Stress testing August2023   Abnormal baseline stress ECG concerning for possible Q waves in inferior leads with poor R wave progression  There were no ischemic changes at maximal stress or recovery  There were occasional PVCs and quadrigeminy in the study  Nuclear perfusion demonstrated fixed defects in the inferior and inferoseptal wall as well as inferoapical portion cannot rule out diaphragmatic or GI attenuation  Minimal reversibility seen to suggest ischemia  Cannot rule out prior infarct versus attenuation artifact  There is normal wall motion of the  affected segments indicating possible more attenuation artifact   Intermediate risk study by criteria     2D echo August 2023      Normal EF 55 to 60%  no specific regional abnormalities identified  Normal atrial sizes  Normal RV size and function  No masses or effusions  Trace to mild valvular insufficiency as documented  Grade 1 diastolic dysfunction  Normal fully collapsible IVC, normal right atrial pressure  No Doppler evidence for ASD or PFO  Ectopy present on exam with PVCs    Calcium scoring August 2023   Findings:  Agatston scores for individual coronary arteries are as follows:  Left main (LM): 121.2  Left anterior descending (LAD): 132.6  Left circumflex (CX): 12.1  Right coronary artery (RCA): 0  Total: 265.9Moderate risk         Review of systems otherwise negative x 14 point review of systems except as mentioned above        Historical data copied forward from previous encounters in EMR including the history, exam, and assessment/plan has been reviewed and is unchanged unless noted otherwise.     Cardiac medicines reviewed with risk, benefits, and necessity of each discussed.     Risk and benefit of cardiac testing reviewed including death heart attack stroke pain bleeding infection need for vascular /cardiovascular surgery were discussed and the patient      Objective:          Reviewed below   regular no rubs gallops heave or lift, 1 out of 6 stock ejection murmur sternal border  No carotid bruits  Mild JVD HJR  Abdomen soft nontender nondistended  No clubbing cyanosis with 2+ edema to the shins pitting  Intact grossly  Skin is warm and dry  Radial pulses 1+ equal bilaterally with normal cap refill        Assessment and plan per my encounter  Abnormal EKG  Shortness of air dyspnea on exertion  Peripheral edema  NYHA class III shortness of air possible CHF  Resting tachycardia  Abnormal calcium score   Abnormal stress test intermediate risk study  Preserved EF by echo 55-60%      LDL 92, normal LFTs  "creatinine electrolytes  Increase simvastatin to 20 daily, goal LDL is less than 70      go to the ER for any unstable symptoms of chest pain shortness of breath or otherwise     Could consider Plavix monotherapy as superior aspirin for CV risk reduction       Other recommendation follow findings clinical course     Ramirez Kevin MD PhD             Objective:         /95 (BP Location: Right arm, Patient Position: Sitting)   Pulse 84   Resp 18   Ht 160 cm (63\")   Wt 57.2 kg (126 lb)   SpO2 95%   BMI 22.32 kg/m²                The pleasure to be involved in this patient's cardiovascular care.  Please call with any questions or concerns  Ramirez Kevin MD, PhD    Most recent EKG as reviewed and interpreted by me:  Procedures     Most recent echo as reviewed and interpreted by me:  Results for orders placed during the hospital encounter of 08/16/23    Adult Transthoracic Echo Complete W/ Color, Spectral and Contrast if Necessary Per Protocol    Interpretation Summary    Left ventricular systolic function is normal. Left ventricular ejection fraction appears to be 51 - 55%.    Left ventricular diastolic function is consistent with (grade I) impaired relaxation.    Estimated right ventricular systolic pressure from tricuspid regurgitation is normal (<35 mmHg).    Normal EF 55 to 60%  no specific regional abnormalities identified  Normal atrial sizes  Normal RV size and function  No masses or effusions  Trace to mild valvular insufficiency as documented  Grade 1 diastolic dysfunction  Normal fully collapsible IVC, normal right atrial pressure  No Doppler evidence for ASD or PFO  Ectopy present on exam with PVCs      Most recent stress test as reviewed and interpreted by me:  Results for orders placed during the hospital encounter of 08/21/23    Stress Test With Myocardial Perfusion One Day    Interpretation Summary    Left ventricular ejection fraction is normal (Calculated EF = 67%).    Nuclear imaging " demonstrates decreased counts in the inferior wall and inferoseptum at baseline as well as stress Is also involvement of the inferoapical portion Cannot rule out significant diaphragmatic or GI attenuation Summed difference score of only 2 in juxtaposed segments, minimal reversibility identified with fixed defects Cannot rule out prior nontransmural infarct versus attenuation artifact, there is normal wall thickening in the segments with no regional abnormality.    Findings consistent with an equivocal ECG stress test.    Abnormal baseline stress ECG concerning for possible Q waves in inferior leads with poor R wave progression  There were no ischemic changes at maximal stress or recovery  There were occasional PVCs and quadrigeminy in the study  Nuclear perfusion demonstrated fixed defects in the inferior and inferoseptal wall as well as inferoapical portion cannot rule out diaphragmatic or GI attenuation  Minimal reversibility seen to suggest ischemia  Cannot rule out prior infarct versus attenuation artifact  There is normal wall motion of the affected segments indicating possible more attenuation artifact    Intermediate risk study noting decreased counts but otherwise no reversibility seen, correlate with clinical symptoms would be recommended      Most recent cardiac catheterization as reviewed interpreted by me:  No results found for this or any previous visit.    The following portions of the patient's history were reviewed and updated as appropriate: allergies, current medications, past family history, past medical history, past social history, past surgical history, and problem list.      ROS:  14 point review of systems negative except as mentioned above    Current Outpatient Medications:     albuterol sulfate HFA (Ventolin HFA) 108 (90 Base) MCG/ACT inhaler, Inhale 2 puffs Every 6 (Six) Hours As Needed for Wheezing or Shortness of Air., Disp: 18 g, Rfl: 0    ALLEGRA-D ALLERGY & CONGESTION  MG per  12 hr tablet, TAKE ONE TABLET BY MOUTH TWICE A DAY AS NEEDED FOR ALLERGIES (Patient taking differently: Take 1 tablet by mouth Daily.), Disp: 60 tablet, Rfl: 0    Budeson-Glycopyrrol-Formoterol (Breztri Aerosphere) 160-9-4.8 MCG/ACT aerosol inhaler, Inhale 2 puffs 2 (Two) Times a Day., Disp: 10.7 g, Rfl: 0    CVS IBUPROFEN PO, PRN, Disp: , Rfl:     denosumab (Prolia) 60 MG/ML solution prefilled syringe syringe, Inject 1 mL under the skin into the appropriate area as directed Every 6 (Six) Months., Disp: 180 mL, Rfl: 1    docusate sodium (COLACE) 50 MG capsule, Take 1 capsule by mouth Daily., Disp: 30 capsule, Rfl:     hydrocortisone 2.5 % cream, Apply 1 application  topically to the appropriate area as directed 2 (Two) Times a Day As Needed for Irritation., Disp: 28 g, Rfl: 0    ipratropium-albuterol (DUO-NEB) 0.5-2.5 mg/3 ml nebulizer, Take 3 mL by nebulization 4 (Four) Times a Day As Needed for Wheezing or Shortness of Air., Disp: 360 mL, Rfl: 1    levothyroxine (SYNTHROID, LEVOTHROID) 100 MCG tablet, Take 1 tablet by mouth Daily., Disp: 90 tablet, Rfl: 2    multivitamin with minerals tablet tablet, Take 1 tablet by mouth Daily., Disp: , Rfl:     omeprazole (priLOSEC) 20 MG capsule, Take 1 capsule by mouth Daily., Disp: 90 capsule, Rfl: 1    vitamin D3 125 MCG (5000 UT) capsule capsule, Take 1 capsule by mouth Every Other Day. 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    Sacroiliac joint pain    Status post revision of total hip replacement    Telangiectasia    Vertigo    Chronic combined systolic (congestive) and diastolic (congestive) heart failure    Mass of hand    Hypothyroidism    Senile osteoporosis    Left elbow pain    Acute pharyngitis     Past Medical History:  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    Post-menopausal     Sacroiliac joint pain     Seasonal allergies     Telangiectasia      Past Surgical History:  Past Surgical History:   Procedure Laterality Date    CHOLECYSTECTOMY      COLONOSCOPY      TA = 2018/ 2021, rech 2024             GSI    EYE SURGERY      B/L Cataract/ Lens Lt    JOINT REPLACEMENT Right     THR---2002/ 2017    MASS EXCISION Left 10/11/2022    Procedure: EXCISION MASS UPPER EXTREMITY, ELBOW;  Surgeon: Jeremy Trinh MD;  Location: [facility] MAIN OR;  Service: Orthopedics;  Laterality: Left;    SPINE SURGERY      Discectomy Lumbar    TONSILLECTOMY       Social History:  Social History     Socioeconomic History    Marital status:    Tobacco Use    Smoking status: Former     Packs/day: 0.50     Years: 40.00     Pack years: 20.00     Types: Cigarettes     Start date: 1969     Quit date: 10/11/2012     Years since quitting: 10.9     Passive exposure: Never    Smokeless tobacco: Never    Tobacco comments:     Vapes 3mL-8   Vaping Use    Vaping Use: Every day    Substances: Flavoring, menthol 12 mg    Devices: Refillable tank   Substance and Sexual Activity     Alcohol use: No    Drug use: No    Sexual activity: Defer     Allergies:  Allergies   Allergen Reactions    Erythromycin Anaphylaxis    Celebrex [Celecoxib] Diarrhea    Percocet [Oxycodone-Acetaminophen] Nausea Only and Dizziness     Immunizations:  Immunization History   Administered Date(s) Administered    COVID-19 (PFIZER) Purple Cap Monovalent 01/28/2021, 02/18/2021, 11/19/2021    FLUAD TRI 65YR+ 09/20/2019    Fluad Quad 65+ 09/20/2019, 10/07/2020    Fluzone High Dose =>65 Years ([employer] ONLY) 09/21/2016, 09/04/2018    Fluzone High-Dose 65+yrs 02/10/2022, 10/28/2022    Influenza TIV (IM) 09/16/2011, 11/04/2012, 10/15/2013, 11/05/2014, 11/05/2015, 10/20/2017    Pneumococcal Conjugate 13-Valent (PCV13) 07/22/2021    Pneumococcal Polysaccharide (PPSV23) 10/12/2018    Shingrix 07/18/2023    Td (TDVAX) 07/06/2011            In-Office Procedure(s):  No orders to display        ASCVD RIsk Score::  The 10-year ASCVD risk score (Dorothy DK, et al., 2019) is: 15.4%    Values used to calculate the score:      Age: 73 years      Sex: Female      Is Non- : No      Diabetic: No      Tobacco smoker: No      Systolic Blood Pressure: 143 mmHg      Is BP treated: No      HDL Cholesterol: 60 mg/dL      Total Cholesterol: 168 mg/dL    Imaging:    Results for orders placed during the hospital encounter of 10/07/22    XR Shoulder 2+ View Left    Narrative  DATE OF EXAM:  10/7/2022 10:53 AM    PROCEDURE:  XR SHOULDER 2+ VW LEFT-    INDICATIONS:  Evaluation of the elbow prior to resection of bony mass; M25.522-Pain in  left elbow    COMPARISON:  No Comparisons Available    TECHNIQUE:  A minimum of two radiologic views of the left shoulder were obtained.      FINDINGS:  Glenohumeral joint and humeral head have a normal appearance. AC joint  and subacromial space within normal limits. No soft tissue  calcifications    Impression  Unremarkable left shoulder    Electronically Signed By-Hardik Cool On:10/7/2022  11:05 AM  This report was finalized on [id number] by  Hardik Cool, .       Results for orders placed during the hospital encounter of 11/17/20    CT Chest Low Dose Wo    Narrative  DATE OF EXAM:  11/17/2020 2:11 PM    PROCEDURE:  CT CHEST LOW DOSE WO-    INDICATIONS:  Lung cancer annual screening, asymptomatic, smoker hx w/in last 15 yrs  (min. 30 pack-yrs)    COMPARISON:  11/12/2019    TECHNIQUE:  Low dose continuous axial images obtained of the chest according to  routine low-dose lung cancer screening CT. Coronal and sagittal  reconstructions were performed. Automated exposure control and iterative  reconstruction was utilized for reduction of CT dose.    FINDINGS:  There are moderate emphysematous changes in the lungs. There is a new 3  mm pulmonary nodule in the right upper lobe. The previously described  pulmonary nodule right lower lobe is stable to smaller in size measuring  3 mm in diameter on the current study. Calcified granulomas are present  in the right upper lobe with calcifications in the right hilum. The  ascending aorta has a maximal diameter 4.1 cm and has not changed. There  is coronary atherosclerotic calcifications. There is no significant  lymphadenopathy. Additional small nodules are present in the right lower  lobe which are unchanged in size from the patient's previous exam.    Impression  1. Lung RADS category two benign findings. A one-year low-dose chest CT  is recommended.  2. Stable 4.1 cm descending aortic aneurysm.  3. Moderate emphysematous lung disease    Electronically Signed By-Naman Day MD On:11/17/2020 2:24 PM  This report was finalized on [id number] by  Naman Day MD.      Results for orders placed during the hospital encounter of 11/17/20    CT Chest Low Dose Wo    Narrative  DATE OF EXAM:  11/17/2020 2:11 PM    PROCEDURE:  CT CHEST LOW DOSE WO-    INDICATIONS:  Lung cancer annual screening, asymptomatic, smoker hx w/in last 15 yrs  (min. 30  pack-yrs)    COMPARISON:  11/12/2019    TECHNIQUE:  Low dose continuous axial images obtained of the chest according to  routine low-dose lung cancer screening CT. Coronal and sagittal  reconstructions were performed. Automated exposure control and iterative  reconstruction was utilized for reduction of CT dose.    FINDINGS:  There are moderate emphysematous changes in the lungs. There is a new 3  mm pulmonary nodule in the right upper lobe. The previously described  pulmonary nodule right lower lobe is stable to smaller in size measuring  3 mm in diameter on the current study. Calcified granulomas are present  in the right upper lobe with calcifications in the right hilum. The  ascending aorta has a maximal diameter 4.1 cm and has not changed. There  is coronary atherosclerotic calcifications. There is no significant  lymphadenopathy. Additional small nodules are present in the right lower  lobe which are unchanged in size from the patient's previous exam.    Impression  1. Lung RADS category two benign findings. A one-year low-dose chest CT  is recommended.  2. Stable 4.1 cm descending aortic aneurysm.  3. Moderate emphysematous lung disease    Electronically Signed By-Naman Day MD On:11/17/2020 2:24 PM  This report was finalized on [id number] by  Naman Day MD.      Lab Review:   Hospital Outpatient Visit on 08/21/2023   Component Date Value    BH CV STRESS PROTOCOL 1 08/21/2023 Pharmacologic     Stage 1 08/21/2023 1.0     HR Stage 1 08/21/2023 108     BP Stage 1 08/21/2023 129/68     Duration Min Stage 1 08/21/2023 0     Duration Sec Stage 1 08/21/2023 10     Stress Dose Regadenoson * 08/21/2023 0.40     Stress Comments Stage 1 08/21/2023 10 sec bolus injection     Stage 2 08/21/2023 2.0     HR Stage 2 08/21/2023 109     BP Stage 2 08/21/2023 129/68     Duration Min Stage 2 08/21/2023 4     Duration Sec Stage 2 08/21/2023 0     Stress Comments Stage 2 08/21/2023 recovery     Stage 3 08/21/2023 3.0     HR  Stage 3 08/21/2023 108     BP Stage 3 08/21/2023 129/73     Baseline HR 08/21/2023 98     Baseline BP 08/21/2023 129/68     Peak HR 08/21/2023 109     Peak BP 08/21/2023 138/84     Recovery HR 08/21/2023 107     Recovery BP 08/21/2023 138/84     Target HR (85%) 08/21/2023 125     Max. Pred. HR (100%) 08/21/2023 147     Percent Max Pred HR 08/21/2023 74.15     Percent Target HR 08/21/2023 87     BH CV REST NUCLEAR ISOTO* 08/21/2023 11.0     BH CV STRESS NUCLEAR ISO* 08/21/2023 33.0     Nuc Stress EF 08/21/2023 67    Lab on 08/21/2023   Component Date Value    Total Cholesterol 08/21/2023 168     Triglycerides 08/21/2023 84     HDL Cholesterol 08/21/2023 60     LDL Cholesterol  08/21/2023 92     VLDL Cholesterol 08/21/2023 16     LDL/HDL Ratio 08/21/2023 1.52     Glucose 08/21/2023 116 (H)     BUN 08/21/2023 13     Creatinine 08/21/2023 0.53 (L)     Sodium 08/21/2023 141     Potassium 08/21/2023 4.6     Chloride 08/21/2023 101     CO2 08/21/2023 30.0 (H)     Calcium 08/21/2023 10.8 (H)     Total Protein 08/21/2023 7.5     Albumin 08/21/2023 5.0     ALT (SGPT) 08/21/2023 15     AST (SGOT) 08/21/2023 21     Alkaline Phosphatase 08/21/2023 66     Total Bilirubin 08/21/2023 0.4     Globulin 08/21/2023 2.5     A/G Ratio 08/21/2023 2.0     BUN/Creatinine Ratio 08/21/2023 24.5     Anion Gap 08/21/2023 10.0     eGFR 08/21/2023 97.8     TSH 08/21/2023 0.507     WBC 08/21/2023 8.86     RBC 08/21/2023 4.80     Hemoglobin 08/21/2023 14.8     Hematocrit 08/21/2023 44.5     MCV 08/21/2023 92.7     MCH 08/21/2023 30.8     MCHC 08/21/2023 33.3     RDW 08/21/2023 12.3     RDW-SD 08/21/2023 41.8     MPV 08/21/2023 10.2     Platelets 08/21/2023 281     Neutrophil % 08/21/2023 75.5     Lymphocyte % 08/21/2023 15.9 (L)     Monocyte % 08/21/2023 7.0     Eosinophil % 08/21/2023 0.8     Basophil % 08/21/2023 0.5     Immature Grans % 08/21/2023 0.3     Neutrophils, Absolute 08/21/2023 6.69     Lymphocytes, Absolute 08/21/2023 1.41      Monocytes, Absolute 08/21/2023 0.62     Eosinophils, Absolute 08/21/2023 0.07     Basophils, Absolute 08/21/2023 0.04     Immature Grans, Absolute 08/21/2023 0.03     nRBC 08/21/2023 0.0     Color, UA 08/21/2023 Yellow     Appearance, UA 08/21/2023 Clear     pH, UA 08/21/2023 6.5     Specific Gravity, UA 08/21/2023 1.006     Glucose, UA 08/21/2023 Negative     Ketones, UA 08/21/2023 Negative     Bilirubin, UA 08/21/2023 Negative     Blood, UA 08/21/2023 Negative     Protein, UA 08/21/2023 Negative     Leuk Esterase, UA 08/21/2023 Negative     Nitrite, UA 08/21/2023 Negative     Urobilinogen, UA 08/21/2023 0.2 E.U./dL     RBC, UA 08/21/2023 0-2     WBC, UA 08/21/2023 None Seen     Bacteria, UA 08/21/2023 None Seen     Squamous Epithelial Cell* 08/21/2023 0-2     Hyaline Casts, UA 08/21/2023 None Seen     Methodology 08/21/2023 Automated Microscopy    Hospital Outpatient Visit on 08/16/2023   Component Date Value    LVIDd 08/16/2023 3.3     LVIDs 08/16/2023 2.37     IVSd 08/16/2023 0.95     LVPWd 08/16/2023 0.96     FS 08/16/2023 27.5     IVS/LVPW 08/16/2023 1.00     ESV(cubed) 08/16/2023 13.3     LV Sys Vol (BSA correcte* 08/16/2023 17.8     EDV(cubed) 08/16/2023 34.8     LV Mahmood Vol (BSA correct* 08/16/2023 39.0     LVOT area 08/16/2023 3.9     LV mass(C)d 08/16/2023 86.9     LVOT diam 08/16/2023 2.22     EDV(MOD-sp4) 08/16/2023 62.5     ESV(MOD-sp4) 08/16/2023 28.4     SV(MOD-sp4) 08/16/2023 34.0     SI(MOD-sp4) 08/16/2023 21.3     EF(MOD-sp4) 08/16/2023 54.5     MV E max deandre 08/16/2023 50.3     MV A max deandre 08/16/2023 84.2     MV dec time 08/16/2023 0.19     MV E/A 08/16/2023 0.60     SV(LVOT) 08/16/2023 39.0     RVIDd 08/16/2023 2.48     LA dimension (2D)  08/16/2023 3.3     Pulm Sys Deandre 08/16/2023 36.2     Pulm Mahmood Deandre 08/16/2023 61.9     Pulm S/D 08/16/2023 0.59     LV V1 max 08/16/2023 67.9     LV V1 max PG 08/16/2023 1.85     LV V1 mean PG 08/16/2023 0.97     LV V1 VTI 08/16/2023 10.1     Ao pk deandre  08/16/2023 72.4     Ao max PG 08/16/2023 2.10     Ao mean PG 08/16/2023 1.21     Ao V2 VTI 08/16/2023 10.6     FAROOQ(I,D) 08/16/2023 3.7     MV max PG 08/16/2023 3.4     MV mean PG 08/16/2023 1.42     MV V2 VTI 08/16/2023 15.6     MVA(VTI) 08/16/2023 2.49     MV dec slope 08/16/2023 265.3     MR max myles 08/16/2023 405.8     MR max PG 08/16/2023 65.9     RV V1 max PG 08/16/2023 1.38     RV V1 max 08/16/2023 58.6     RV V1 VTI 08/16/2023 8.8     PA V2 max 08/16/2023 69.4     PA acc time 08/16/2023 0.14     PI end-d myles 08/16/2023 88.7     Ao root diam 08/16/2023 3.6     ACS 08/16/2023 2.29     EF(MOD-bp) 08/16/2023 54.5    Clinical Support on 07/14/2023   Component Date Value    Total Cholesterol 07/14/2023 156     Triglycerides 07/14/2023 88     HDL Cholesterol 07/14/2023 50     LDL Cholesterol  07/14/2023 89     VLDL Cholesterol 07/14/2023 17     LDL/HDL Ratio 07/14/2023 1.77     Glucose 07/14/2023 102 (H)     BUN 07/14/2023 10     Creatinine 07/14/2023 0.53 (L)     Sodium 07/14/2023 142     Potassium 07/14/2023 3.9     Chloride 07/14/2023 102     CO2 07/14/2023 28.8     Calcium 07/14/2023 9.6     Total Protein 07/14/2023 6.9     Albumin 07/14/2023 4.2     ALT (SGPT) 07/14/2023 13     AST (SGOT) 07/14/2023 22     Alkaline Phosphatase 07/14/2023 56     Total Bilirubin 07/14/2023 0.3     Globulin 07/14/2023 2.7     A/G Ratio 07/14/2023 1.6     BUN/Creatinine Ratio 07/14/2023 18.9     Anion Gap 07/14/2023 11.2     eGFR 07/14/2023 97.8     Free T4 07/14/2023 1.49     TSH 07/14/2023 0.736     25 Hydroxy, Vitamin D 07/14/2023 61.7    Clinical Support on 04/25/2023   Component Date Value    Calcium 04/25/2023 9.5      Recent labs reviewed and interpreted for clinical significance and application            Level of Care:           Ramirez Kevin MD  09/13/23  .

## 2023-09-20 ENCOUNTER — OFFICE VISIT (OUTPATIENT)
Dept: ORTHOPEDIC SURGERY | Facility: CLINIC | Age: 73
End: 2023-09-20
Payer: MEDICARE

## 2023-09-20 VITALS — WEIGHT: 126 LBS | HEIGHT: 63 IN | BODY MASS INDEX: 22.32 KG/M2

## 2023-09-20 DIAGNOSIS — L94.2 CALCINOSIS CUTIS: ICD-10-CM

## 2023-09-20 DIAGNOSIS — M25.522 LEFT ELBOW PAIN: Primary | ICD-10-CM

## 2023-09-20 PROCEDURE — 1159F MED LIST DOCD IN RCRD: CPT | Performed by: ORTHOPAEDIC SURGERY

## 2023-09-20 PROCEDURE — 99213 OFFICE O/P EST LOW 20 MIN: CPT | Performed by: ORTHOPAEDIC SURGERY

## 2023-09-20 PROCEDURE — 1160F RVW MEDS BY RX/DR IN RCRD: CPT | Performed by: ORTHOPAEDIC SURGERY

## 2023-09-20 NOTE — PROGRESS NOTES
"Chief Complaint  Follow-up of the Left Elbow    Subjective    History of Present Illness      Eva Borrego is a 73 y.o. female who presents to [facility] for follow-up on left elbow bony mass resection.  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  She is neurovascularly intact.  Her function has returned back to fairly normal on a day-to-day basis.  She has full range of motion of both the elbow as well as the wrist.  She does have some areas of recurrence of the calcification.  This does not seem to bother the patient significantly.  There is no evidence of penetration of the skin and soft tissues with the recurrent calcification.  She does not have any localized infections in that location.  She has developed another nodule on the dorsal aspect of the right thumb.  We will keep a very close eye on this nodule because at this point it is not symptomatic enough to require surgical resection since it is not hampering her activities of daily living.  The patient understands and accepts the situation.  Pain Location:  LEFT elbow  Radiation: none  Quality: dull  Intensity/Severity: mild   Duration:  Several months  Progression of symptoms: no worsening, symptoms stable/unchanged  Onset quality: sudden  Timing: intermittent  Aggravating Factors: reaching across body, rotating motion, repetitive motion  Alleviating Factors: NSAIDs, ice  Previous Episodes: yes  Associated Symptoms: pain, numbness/tingling  ADLs Affected: eating, grooming/hygiene/toileting/personal care  Previous Treatment: prior surgery       Objective   Vital Signs:   Ht 160 cm (63\")   Wt 57.2 kg (126 lb)   BMI 22.32 kg/m²     Physical Exam  Physical Exam  Vitals signs and nursing note reviewed.   Constitutional:       Appearance: Normal appearance.   Pulmonary:      Effort: Pulmonary effort is normal. "   Skin:     General: Skin is warm and dry.      Capillary Refill: Capillary refill takes less than 2 seconds.   Neurological:      General: No focal deficit present.      Mental Status: He is alert and oriented to person, place, and time. Mental status is at baseline.   Psychiatric:         Mood and Affect: Mood normal.         Behavior: Behavior normal.         Thought Content: Thought content normal.         Judgment: Judgment normal.     Ortho Exam left elbow: The patient is right-hand dominant.  There is no neurovascular deficit.  Previous incision on the dorsal aspect of the elbow has completely healed.  Full range of motion of the elbow in flexion and pronation supination.  Full range of motion of the wrist is noted as well.  There are some irregular areas of calcification with prominence and protuberance of the skin which do not show any signs of an infection.  There is no limitation of range of motion at the shoulder either.  Distal pulses are palpable.        Result Review :  The following data was reviewed by: Jeremy Trinh MD on 09/20/2023:    xrays obtained today  left Elbow X-Ray  Indication: Evaluation of heterotopic ossification recurrence following resection of calcific cutis.  Views: AP and Lateral views  Findings: Ectopic calcification is noted.  No fractures are noted.  There is no evidence of synostosis between the radius and the ulna.  no bony lesion  Soft tissues within normal limits  within normal limits joint spaces  Hardware appropriately positioned not applicable      yes prior studies available for comparison.    X-RAY was ordered and reviewed by Jeremy Trinh MD             Procedures           Assessment   Assessment and Plan    Diagnoses and all orders for this visit:    1. Left elbow pain (Primary)  -     Cancel: XR Elbow 3+ View Left  -     XR elbow 2 vw left    2. Calcinosis cutis          Follow Up   Compression/brace can now be weaned off.  Calcium and vitamin D for bone  health.  Possibility of recurrence of the ectopic calcification and possibility of need for surgical intervention discussed with the patient at length.  At this point there is no indication for surgical intervention and we will keep a very close eye on symptom progression.  Rest, ice, compression, and elevation (RICE) therapy  Stretching and strengthening exercises of the flexors and extensors of the elbow.  OTC Tylenol 500-1000mg by mouth every 6 hours as needed for pain   Follow up in 6 month(s)  Patient was given instructions and counseling regarding her condition or for health maintenance advice. Please see specific information pulled into the AVS if appropriate.     Jeremy Trinh MD   Date of Encounter: 9/20/2023     EMR Dragon/Transcription disclaimer:  Much of this encounter note is an electronic transcription/translation of spoken language to printed text. The electronic translation of spoken language may permit erroneous, or at times, nonsensical words or phrases to be inadvertently transcribed; Although I have reviewed the note for such errors, some may still exist.

## 2023-09-21 RX ORDER — BUDESONIDE, GLYCOPYRROLATE, AND FORMOTEROL FUMARATE 160; 9; 4.8 UG/1; UG/1; UG/1
2 AEROSOL, METERED RESPIRATORY (INHALATION) 2 TIMES DAILY
Qty: 32.1 G | Refills: 3 | Status: SHIPPED | OUTPATIENT
Start: 2023-09-21

## 2023-09-21 RX ORDER — BUDESONIDE, GLYCOPYRROLATE, AND FORMOTEROL FUMARATE 160; 9; 4.8 UG/1; UG/1; UG/1
2 AEROSOL, METERED RESPIRATORY (INHALATION) 2 TIMES DAILY
Qty: 32.1 G | Refills: 3 | Status: SHIPPED | OUTPATIENT
Start: 2023-09-21 | End: 2023-09-21 | Stop reason: SDUPTHER

## 2023-09-29 ENCOUNTER — TELEPHONE (OUTPATIENT)
Dept: FAMILY MEDICINE CLINIC | Facility: CLINIC | Age: 73
End: 2023-09-29

## 2023-09-29 NOTE — TELEPHONE ENCOUNTER
Caller: Eva Borrego    Relationship to patient: Self    Best call back number: [phone]    Patient is needing:     IS REQUESTING A MEDICATION REFILL    Breztri Aerosphere 160-9-4.8 MCG/ACT aerosol inhaler       BE SENT IN TO [email or web address] AS SHE IS IN THEIR PROGRAM TO GET THIS MEDICATION DIRECTLY FROM THE MANUFACTURE.

## 2023-10-02 DIAGNOSIS — M53.3 SACROILIAC JOINT PAIN: ICD-10-CM

## 2023-10-02 RX ORDER — HYDROCODONE BITARTRATE AND ACETAMINOPHEN 7.5; 325 MG/1; MG/1
1 TABLET ORAL EVERY 6 HOURS PRN
Qty: 120 TABLET | Refills: 0 | Status: SHIPPED | OUTPATIENT
Start: 2023-10-02

## 2023-10-02 NOTE — TELEPHONE ENCOUNTER
Caller: Eva Borrego    Relationship: Self    Best call back number: [phone]    Requested Prescriptions:   Requested Prescriptions     Pending Prescriptions Disp Refills    HYDROcodone-acetaminophen (Norco) 7.5-325 MG per tablet 120 tablet 0     Sig: Take 1 tablet by mouth Every 6 (Six) Hours As Needed for Severe Pain.        Pharmacy where request should be sent: [facility] [id number] [place] [address] AT [facility] [address] [place] [address] - [phone] [facility] [phone] FX     Last office visit with prescribing clinician: 7/18/2023   Last telemedicine visit with prescribing clinician: Visit date not found   Next office visit with prescribing clinician: 1/23/2024     Additional details provided by patient: PATIENT HAS A 1 DAY SUPPLY LEFT OF THIS MEDICATION.       Does the patient have less than a 3 day supply:  [x] Yes  [] No    Would you like a call back once the refill request has been completed: [] Yes [x] No    If the office needs to give you a call back, can they leave a voicemail: [] Yes [x] No    Bryce Lama Rep   10/02/23 15:21 EDT

## 2023-10-10 ENCOUNTER — TELEMEDICINE (OUTPATIENT)
Dept: CARDIOLOGY | Facility: CLINIC | Age: 73
End: 2023-10-10
Payer: MEDICARE

## 2023-10-10 VITALS
SYSTOLIC BLOOD PRESSURE: 130 MMHG | WEIGHT: 126 LBS | HEIGHT: 63 IN | HEART RATE: 126 BPM | DIASTOLIC BLOOD PRESSURE: 87 MMHG | BODY MASS INDEX: 22.32 KG/M2

## 2023-10-10 DIAGNOSIS — R06.09 EXERTIONAL DYSPNEA: Primary | ICD-10-CM

## 2023-10-10 DIAGNOSIS — E78.49 OTHER HYPERLIPIDEMIA: ICD-10-CM

## 2023-10-10 DIAGNOSIS — R94.31 ABNORMAL EKG: ICD-10-CM

## 2023-10-10 DIAGNOSIS — Z13.220 SCREENING FOR CHOLESTEROL LEVEL: ICD-10-CM

## 2023-10-10 PROCEDURE — 99213 OFFICE O/P EST LOW 20 MIN: CPT | Performed by: INTERNAL MEDICINE

## 2023-10-10 RX ORDER — SIMVASTATIN 40 MG
40 TABLET ORAL NIGHTLY
Qty: 90 TABLET | Refills: 3 | Status: SHIPPED | OUTPATIENT
Start: 2023-10-10 | End: 2024-10-09

## 2023-10-10 NOTE — PROGRESS NOTES
Cardiology Clinic Note  Ramirez Kevin MD, PhD    Subjective:     Encounter Date:10/10/2023      Patient ID: Eva Borrego is a 73 y.o. female.    Chief Complaint:  Chief Complaint   Patient presents with    Follow-up       HPI:  Verbal consent for telehealth obtained  Unable to complete visit using a video connection to the patient. A phone visit was used to complete this visits. Total time of discussion was 13 minutes additional time and charting coordination of care documentation medicine changes for total encounter time greater than 25 minutes  .   I the pleasure to see this 73-year-old female  in follow-up by telehealth today, initially referred for palpitations and dyspnea on exertion atypical chest pain.  She has advanced panlobular emphysema, long smoking history on multiple bronchodilators.  Her resting heart rate is in the 110s initially on encounter, EKG at that time abnormal with sinus tachycardia, right axis deviation, possible Q waves in the inferior leads possible old inferior MI which would be silent in the past.  She has lower extremity edema of 1-2+ from the shins to the ankles, NYHA class III shortness of breath no unstable angina symptoms.  I discussed her abnormal EKG, symptoms, peripheral edema on physical exam which she is amenable for testing  previously which was accomplished as below.  She seems somewhat surprised by findings of EKG physical exam and constellation of symptoms that we discussed today which could point to cardiac abnormality     Testing was performed.  Intermediate risk studies, moderate risk calcium score, preserved EF by 2D echo.  we discussed if she has chest discomfort or further dyspnea on exertion, left heart catheterization would be warranted for definitive evaluation an IFR guided approach for any suspect CAD given abnormal calcium score of mostly a in the left main as well as LAD      Stress testing August2023   Abnormal baseline stress ECG concerning for  possible Q waves in inferior leads with poor R wave progression  There were no ischemic changes at maximal stress or recovery  There were occasional PVCs and quadrigeminy in the study  Nuclear perfusion demonstrated fixed defects in the inferior and inferoseptal wall as well as inferoapical portion cannot rule out diaphragmatic or GI attenuation  Minimal reversibility seen to suggest ischemia  Cannot rule out prior infarct versus attenuation artifact  There is normal wall motion of the affected segments indicating possible more attenuation artifact   Intermediate risk study by criteria      2D echo August 2023      Normal EF 55 to 60%  no specific regional abnormalities identified  Normal atrial sizes  Normal RV size and function  No masses or effusions  Trace to mild valvular insufficiency as documented  Grade 1 diastolic dysfunction  Normal fully collapsible IVC, normal right atrial pressure  No Doppler evidence for ASD or PFO  Ectopy present on exam with PVCs     Calcium scoring August 2023   Findings:  Agatston scores for individual coronary arteries are as follows:  Left main (LM): 121.2  Left anterior descending (LAD): 132.6  Left circumflex (CX): 12.1  Right coronary artery (RCA): 0  Total: 265.9Moderate risk         Review of systems otherwise negative x 14 point review of systems except as mentioned above        Historical data copied forward from previous encounters in EMR including the history, exam, and assessment/plan has been reviewed and is unchanged unless noted otherwise.     Cardiac medicines reviewed with risk, benefits, and necessity of each discussed.     Risk and benefit of cardiac testing reviewed including death heart attack stroke pain bleeding infection need for vascular /cardiovascular surgery were discussed and the patient      Objective:          Reviewed below  Vitals reviewed  Tachycardia noted but patient asymptomatic  We will recheck ultimately today  If continues to have high heart  "rates will present to the clinic for EKG tomorrow   regular no rubs gallops heave or lift, 1 out of 6 stock ejection murmur sternal border  No carotid bruits  Mild JVD HJR  Abdomen soft nontender nondistended  No clubbing cyanosis with 2+ edema to the shins pitting  Intact grossly  Skin is warm and dry  Radial pulses 1+ equal bilaterally with normal cap refill        Assessment and plan per my encounter  Abnormal EKG  Shortness of air dyspnea on exertion, related to COPD  May be multifactorial, cannot rule out A-fib with high resting heart rates today  Follow-up tomorrow with EKG if heart rates remain greater than 100  Peripheral edema stable  Grade 1 diastolic dysfunction fully collapsible IVC, volume not contributing to shortness of air  Resting tachycardia possibly pulmonary related  Abnormal calcium score with at least moderate atherosclerotic disease  Abnormal stress test intermediate risk study, will defer for another 3 months and see her back, if has worsening dyspnea exertion left heart catheterization for definitive evaluation of coronary anatomy as well as estimation of filling pressures  Preserved EF by echo 55-60%      LDL 92, normal LFTs creatinine electrolytes  Increase simvastatin to 40 daily, goal LDL is less than 70        go to the ER for any unstable symptoms of chest pain shortness of breath or otherwise      Could consider Plavix monotherapy as superior aspirin for CV risk reduction         Other recommendation follow findings clinical course      Ramirez Kevin MD PhD         Objective:         /87   Pulse (!) 126   Ht 160 cm (63\")   Wt 57.2 kg (126 lb)   BMI 22.32 kg/mý       The pleasure to be involved in this patient's cardiovascular care.  Please call with any questions or concerns  Ramirez Kevin MD, PhD    Most recent EKG as reviewed and interpreted by me:  Procedures     Most recent echo as reviewed and interpreted by me:  Results for orders placed during the hospital encounter " of 08/16/23    Adult Transthoracic Echo Complete W/ Color, Spectral and Contrast if Necessary Per Protocol    Interpretation Summary    Left ventricular systolic function is normal. Left ventricular ejection fraction appears to be 51 - 55%.    Left ventricular diastolic function is consistent with (grade I) impaired relaxation.    Estimated right ventricular systolic pressure from tricuspid regurgitation is normal (<35 mmHg).    Normal EF 55 to 60%  no specific regional abnormalities identified  Normal atrial sizes  Normal RV size and function  No masses or effusions  Trace to mild valvular insufficiency as documented  Grade 1 diastolic dysfunction  Normal fully collapsible IVC, normal right atrial pressure  No Doppler evidence for ASD or PFO  Ectopy present on exam with PVCs      Most recent stress test as reviewed and interpreted by me:  Results for orders placed during the hospital encounter of 08/21/23    Stress Test With Myocardial Perfusion One Day    Interpretation Summary    Left ventricular ejection fraction is normal (Calculated EF = 67%).    Nuclear imaging demonstrates decreased counts in the inferior wall and inferoseptum at baseline as well as stress Is also involvement of the inferoapical portion Cannot rule out significant diaphragmatic or GI attenuation Summed difference score of only 2 in juxtaposed segments, minimal reversibility identified with fixed defects Cannot rule out prior nontransmural infarct versus attenuation artifact, there is normal wall thickening in the segments with no regional abnormality.    Findings consistent with an equivocal ECG stress test.    Abnormal baseline stress ECG concerning for possible Q waves in inferior leads with poor R wave progression  There were no ischemic changes at maximal stress or recovery  There were occasional PVCs and quadrigeminy in the study  Nuclear perfusion demonstrated fixed defects in the inferior and inferoseptal wall as well as inferoapical  portion cannot rule out diaphragmatic or GI attenuation  Minimal reversibility seen to suggest ischemia  Cannot rule out prior infarct versus attenuation artifact  There is normal wall motion of the affected segments indicating possible more attenuation artifact    Intermediate risk study noting decreased counts but otherwise no reversibility seen, correlate with clinical symptoms would be recommended      Most recent cardiac catheterization as reviewed interpreted by me:  No results found for this or any previous visit.    The following portions of the patient's history were reviewed and updated as appropriate: allergies, current medications, past family history, past medical history, past social history, past surgical history, and problem list.      ROS:  14 point review of systems negative except as mentioned above    Current Outpatient Medications:     albuterol sulfate HFA (Ventolin HFA) 108 (90 Base) MCG/ACT inhaler, Inhale 2 puffs Every 6 (Six) Hours As Needed for Wheezing or Shortness of Air., Disp: 18 g, Rfl: 0    ALLEGRA-D ALLERGY & CONGESTION  MG per 12 hr tablet, TAKE ONE TABLET BY MOUTH TWICE A DAY AS NEEDED FOR ALLERGIES (Patient taking differently: Take 1 tablet by mouth Daily.), Disp: 60 tablet, Rfl: 0    Breztri Aerosphere 160-9-4.8 MCG/ACT aerosol inhaler, Inhale 2 puffs 2 (Two) Times a Day., Disp: 32.1 g, Rfl: 3    [employer] IBUPROFEN PO, PRN, Disp: , Rfl:     denosumab (Prolia) 60 MG/ML solution prefilled syringe syringe, Inject 1 mL under the skin into the appropriate area as directed Every 6 (Six) Months., Disp: 180 mL, Rfl: 1    docusate sodium (COLACE) 50 MG capsule, Take 1 capsule by mouth Daily., Disp: 30 capsule, Rfl:     HYDROcodone-acetaminophen (Norco) 7.5-325 MG per tablet, Take 1 tablet by mouth Every 6 (Six) Hours As Needed for Severe Pain., Disp: 120 tablet, Rfl: 0    hydrocortisone 2.5 % cream, Apply 1 application  topically to the appropriate area as directed 2 (Two) Times a Day  As Needed for Irritation., Disp: 28 g, Rfl: 0    ipratropium-albuterol (DUO-NEB) 0.5-2.5 mg/3 ml nebulizer, Take 3 mL by nebulization 4 (Four) Times a Day As Needed for Wheezing or Shortness of Air., Disp: 360 mL, Rfl: 1    levothyroxine (SYNTHROID, LEVOTHROID) 100 MCG tablet, Take 1 tablet by mouth Daily., Disp: 90 tablet, Rfl: 2    multivitamin with minerals tablet tablet, Take 1 tablet by mouth Daily., Disp: , Rfl:     omeprazole (priLOSEC) 20 MG capsule, Take 1 capsule by mouth Daily., Disp: 90 capsule, Rfl: 1    simvastatin (Zocor) 40 MG tablet, Take 1 tablet by mouth Every Night., Disp: 90 tablet, Rfl: 3    vitamin D3 125 MCG (5000 UT) capsule capsule, Take 1 capsule by mouth Every Other Day. 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    Sacroiliac joint pain    Status post revision of total hip replacement    Telangiectasia    Vertigo    Chronic combined systolic (congestive) and diastolic (congestive) heart failure    Mass of hand    Hypothyroidism    Senile osteoporosis    Left elbow pain    Acute pharyngitis     Past Medical History:  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    Post-menopausal     Sacroiliac joint pain      Seasonal allergies     Telangiectasia      Past Surgical History:  Past Surgical History:   Procedure Laterality Date    CHOLECYSTECTOMY      COLONOSCOPY      TA = 2021, rech              GSI    EYE SURGERY      B/L Cataract/ Lens Lt    JOINT REPLACEMENT Right     THR---2017    MASS EXCISION Left 10/11/2022    Procedure: EXCISION MASS UPPER EXTREMITY, ELBOW;  Surgeon: Jeremy Trinh MD;  [facility] MAIN OR;  Service: Orthopedics;  Laterality: Left;    SPINE SURGERY      Discectomy Lumbar    TONSILLECTOMY       Social History:  Social History     Socioeconomic History    Marital status:    Tobacco Use    Smoking status: Former     Packs/day: 0.50     Years: 40.00     Additional pack years: 0.00     Total pack years: 20.00     Types: Cigarettes     Start date:      Quit date: 10/11/2012     Years since quittin.0     Passive exposure: Never    Smokeless tobacco: Never    Tobacco comments:     Vapes 3mL-8   Vaping Use    Vaping Use: Every day    Substances: Flavoring, menthol 12 mg    Devices: [email or web address] tank   Substance and Sexual Activity    Alcohol use: No    Drug use: No    Sexual activity: Defer     Allergies:  Allergies   Allergen Reactions    Erythromycin Anaphylaxis    Celebrex [Celecoxib] Diarrhea    Percocet [Oxycodone-Acetaminophen] Nausea Only and Dizziness     Immunizations:  Immunization History   Administered Date(s) Administered    COVID-19 (PFIZER) Purple Cap Monovalent 2021, 2021, 2021    FLUAD TRI 65YR+ 2019    Fluad Quad 65+ 2019, 10/07/2020    Fluzone High Dose =>65 Years ([employer] ONLY) 2016, 2018    Fluzone High-Dose 65+yrs 02/10/2022, 10/28/2022    Influenza TIV (IM) 2011, 2012, 10/15/2013, 2014, 2015, 10/20/2017    Pneumococcal Conjugate 13-Valent (PCV13) 2021    Pneumococcal Polysaccharide (PPSV23) 10/12/2018    Shingrix 2023    Td (TDVAX) 2011            In-Office  Procedure(s):  No orders to display        ASCVD RIsk Score::  The 10-year ASCVD risk score (Dorothy CARLISLE, et al., 2019) is: 12.9%    Values used to calculate the score:      Age: 73 years      Sex: Female      Is Non- : No      Diabetic: No      Tobacco smoker: No      Systolic Blood Pressure: 130 mmHg      Is BP treated: No      HDL Cholesterol: 60 mg/dL      Total Cholesterol: 168 mg/dL    Imaging:    Results for orders placed in visit on 09/20/23    XR elbow 2 vw left    Narrative  left Elbow X-Ray  Indication: Evaluation of heterotopic ossification recurrence following resection of calcific cutis.  Views: AP and Lateral views  Findings: Ectopic calcification is noted.  No fractures are noted.  There is no evidence of synostosis between the radius and the ulna.  no bony lesion  Soft tissues within normal limits  within normal limits joint spaces  Hardware appropriately positioned not applicable      yes prior studies available for comparison.    X-RAY was ordered and reviewed by Jeremy Trinh MD       Results for orders placed during the hospital encounter of 08/31/23    CT Cardiac Calcium Score Without Dye    Narrative  CT CARDIAC CALCIUM SCORE WO DYE    Date of Exam: 8/31/2023 1:39 PM EDT    Indication: abn ekg.    Comparison: None available.    Technique: Multiple thin section CT axial images were obtained with prospective ECG-gating without intravenous contrast.    Findings:  Agatston scores for individual coronary arteries are as follows:  Left main (LM): 121.2  Left anterior descending (LAD): 132.6  Left circumflex (CX): 12.1  Right coronary artery (RCA): 0  Total: 265.9    Visualized lungs are clear.    Impression  Impression:  Total coronary artery calcium score is 265.9.    Calcium Score Interpretation  0 -- Negative examination, no identifiable atherosclerotic plaque.  Very low risk of cardiac events in the next 5 years.  1-10 -- Minimal plaque burden.  Low risk of cardiac events  in the next 5 years.  11- 100 -- Mild Plaque burden.  Mild risk of cardiac events in the next 5 years.  101 - 400 -- Moderate plaque burden.  Moderate risk of cardiac events in the next 5 years.  Over 400 -- Extensive plaque burden.  High risk of cardiac events in the next 5 years.      Electronically Signed: Henry Estrada MD  8/31/2023 8:15 PM EDT  Workstation ID: [id number]      Results for orders placed during the hospital encounter of 08/31/23    CT Cardiac Calcium Score Without Dye    Narrative  CT CARDIAC CALCIUM SCORE WO DYE    Date of Exam: 8/31/2023 1:39 PM EDT    Indication: abn ekg.    Comparison: None available.    Technique: Multiple thin section CT axial images were obtained with prospective ECG-gating without intravenous contrast.    Findings:  Agatston scores for individual coronary arteries are as follows:  Left main (LM): 121.2  Left anterior descending (LAD): 132.6  Left circumflex (CX): 12.1  Right coronary artery (RCA): 0  Total: 265.9    Visualized lungs are clear.    Impression  Impression:  Total coronary artery calcium score is 265.9.    Calcium Score Interpretation  0 -- Negative examination, no identifiable atherosclerotic plaque.  Very low risk of cardiac events in the next 5 years.  1-10 -- Minimal plaque burden.  Low risk of cardiac events in the next 5 years.  11- 100 -- Mild Plaque burden.  Mild risk of cardiac events in the next 5 years.  101 - 400 -- Moderate plaque burden.  Moderate risk of cardiac events in the next 5 years.  Over 400 -- Extensive plaque burden.  High risk of cardiac events in the next 5 years.      Electronically Signed: Henry Estrada MD  8/31/2023 8:15 PM EDT  Workstation ID: [id number]      Lab Review:   Hospital Outpatient Visit on 08/21/2023   Component Date Value    BH CV STRESS PROTOCOL 1 08/21/2023 Pharmacologic     Stage 1 08/21/2023 1.0     HR Stage 1 08/21/2023 108     BP Stage 1 08/21/2023 129/68     Duration Min Stage 1 08/21/2023 0     Duration Sec  Stage 1 08/21/2023 10     Stress Dose Regadenoson * 08/21/2023 0.40     Stress Comments Stage 1 08/21/2023 10 sec bolus injection     Stage 2 08/21/2023 2.0     HR Stage 2 08/21/2023 109     BP Stage 2 08/21/2023 129/68     Duration Min Stage 2 08/21/2023 4     Duration Sec Stage 2 08/21/2023 0     Stress Comments Stage 2 08/21/2023 recovery     Stage 3 08/21/2023 3.0     HR Stage 3 08/21/2023 108     BP Stage 3 08/21/2023 129/73     Baseline HR 08/21/2023 98     Baseline BP 08/21/2023 129/68     Peak HR 08/21/2023 109     Peak BP 08/21/2023 138/84     Recovery HR 08/21/2023 107     Recovery BP 08/21/2023 138/84     Target HR (85%) 08/21/2023 125     Max. Pred. HR (100%) 08/21/2023 147     Percent Max Pred HR 08/21/2023 74.15     Percent Target HR 08/21/2023 87     BH CV REST NUCLEAR ISOTO* 08/21/2023 11.0     BH CV STRESS NUCLEAR ISO* 08/21/2023 33.0     Nuc Stress EF 08/21/2023 67    Lab on 08/21/2023   Component Date Value    Total Cholesterol 08/21/2023 168     Triglycerides 08/21/2023 84     HDL Cholesterol 08/21/2023 60     LDL Cholesterol  08/21/2023 92     VLDL Cholesterol 08/21/2023 16     LDL/HDL Ratio 08/21/2023 1.52     Glucose 08/21/2023 116 (H)     BUN 08/21/2023 13     Creatinine 08/21/2023 0.53 (L)     Sodium 08/21/2023 141     Potassium 08/21/2023 4.6     Chloride 08/21/2023 101     CO2 08/21/2023 30.0 (H)     Calcium 08/21/2023 10.8 (H)     Total Protein 08/21/2023 7.5     Albumin 08/21/2023 5.0     ALT (SGPT) 08/21/2023 15     AST (SGOT) 08/21/2023 21     Alkaline Phosphatase 08/21/2023 66     Total Bilirubin 08/21/2023 0.4     Globulin 08/21/2023 2.5     A/G Ratio 08/21/2023 2.0     BUN/Creatinine Ratio 08/21/2023 24.5     Anion Gap 08/21/2023 10.0     eGFR 08/21/2023 97.8     TSH 08/21/2023 0.507     WBC 08/21/2023 8.86     RBC 08/21/2023 4.80     Hemoglobin 08/21/2023 14.8     Hematocrit 08/21/2023 44.5     MCV 08/21/2023 92.7     MCH 08/21/2023 30.8     MCHC 08/21/2023 33.3     RDW 08/21/2023  12.3     RDW-SD 08/21/2023 41.8     MPV 08/21/2023 10.2     Platelets 08/21/2023 281     Neutrophil % 08/21/2023 75.5     Lymphocyte % 08/21/2023 15.9 (L)     Monocyte % 08/21/2023 7.0     Eosinophil % 08/21/2023 0.8     Basophil % 08/21/2023 0.5     Immature Grans % 08/21/2023 0.3     Neutrophils, Absolute 08/21/2023 6.69     Lymphocytes, Absolute 08/21/2023 1.41     Monocytes, Absolute 08/21/2023 0.62     Eosinophils, Absolute 08/21/2023 0.07     Basophils, Absolute 08/21/2023 0.04     Immature Grans, Absolute 08/21/2023 0.03     nRBC 08/21/2023 0.0     Color, UA 08/21/2023 Yellow     Appearance, UA 08/21/2023 Clear     pH, UA 08/21/2023 6.5     Specific Gravity, UA 08/21/2023 1.006     Glucose, UA 08/21/2023 Negative     Ketones, UA 08/21/2023 Negative     Bilirubin, UA 08/21/2023 Negative     Blood, UA 08/21/2023 Negative     Protein, UA 08/21/2023 Negative     Leuk Esterase, UA 08/21/2023 Negative     Nitrite, UA 08/21/2023 Negative     Urobilinogen, UA 08/21/2023 0.2 E.U./dL     RBC, UA 08/21/2023 0-2     WBC, UA 08/21/2023 None Seen     Bacteria, UA 08/21/2023 None Seen     Squamous Epithelial Cell* 08/21/2023 0-2     Hyaline Casts, UA 08/21/2023 None Seen     Methodology 08/21/2023 Automated Microscopy    Hospital Outpatient Visit on 08/16/2023   Component Date Value    LVIDd 08/16/2023 3.3     LVIDs 08/16/2023 2.37     IVSd 08/16/2023 0.95     LVPWd 08/16/2023 0.96     FS 08/16/2023 27.5     IVS/LVPW 08/16/2023 1.00     ESV(cubed) 08/16/2023 13.3     LV Sys Vol (BSA correcte* 08/16/2023 17.8     EDV(cubed) 08/16/2023 34.8     LV Mahmood Vol (BSA correct* 08/16/2023 39.0     LVOT area 08/16/2023 3.9     LV mass(C)d 08/16/2023 86.9     LVOT diam 08/16/2023 2.22     EDV(MOD-sp4) 08/16/2023 62.5     ESV(MOD-sp4) 08/16/2023 28.4     SV(MOD-sp4) 08/16/2023 34.0     SI(MOD-sp4) 08/16/2023 21.3     EF(MOD-sp4) 08/16/2023 54.5     MV E max myles 08/16/2023 50.3     MV A max myles 08/16/2023 84.2     MV dec time 08/16/2023  0.19     MV E/A 08/16/2023 0.60     SV(LVOT) 08/16/2023 39.0     RVIDd 08/16/2023 2.48     LA dimension (2D)  08/16/2023 3.3     Pulm Sys Deandre 08/16/2023 36.2     Pulm Mahmood Deandre 08/16/2023 61.9     Pulm S/D 08/16/2023 0.59     LV V1 max 08/16/2023 67.9     LV V1 max PG 08/16/2023 1.85     LV V1 mean PG 08/16/2023 0.97     LV V1 VTI 08/16/2023 10.1     Ao pk deandre 08/16/2023 72.4     Ao max PG 08/16/2023 2.10     Ao mean PG 08/16/2023 1.21     Ao V2 VTI 08/16/2023 10.6     FAROOQ(I,D) 08/16/2023 3.7     MV max PG 08/16/2023 3.4     MV mean PG 08/16/2023 1.42     MV V2 VTI 08/16/2023 15.6     MVA(VTI) 08/16/2023 2.49     MV dec slope 08/16/2023 265.3     MR max deandre 08/16/2023 405.8     MR max PG 08/16/2023 65.9     RV V1 max PG 08/16/2023 1.38     RV V1 max 08/16/2023 58.6     RV V1 VTI 08/16/2023 8.8     PA V2 max 08/16/2023 69.4     PA acc time 08/16/2023 0.14     PI end-d deandre 08/16/2023 88.7     Ao root diam 08/16/2023 3.6     ACS 08/16/2023 2.29     EF(MOD-bp) 08/16/2023 54.5    Clinical Support on 07/14/2023   Component Date Value    Total Cholesterol 07/14/2023 156     Triglycerides 07/14/2023 88     HDL Cholesterol 07/14/2023 50     LDL Cholesterol  07/14/2023 89     VLDL Cholesterol 07/14/2023 17     LDL/HDL Ratio 07/14/2023 1.77     Glucose 07/14/2023 102 (H)     BUN 07/14/2023 10     Creatinine 07/14/2023 0.53 (L)     Sodium 07/14/2023 142     Potassium 07/14/2023 3.9     Chloride 07/14/2023 102     CO2 07/14/2023 28.8     Calcium 07/14/2023 9.6     Total Protein 07/14/2023 6.9     Albumin 07/14/2023 4.2     ALT (SGPT) 07/14/2023 13     AST (SGOT) 07/14/2023 22     Alkaline Phosphatase 07/14/2023 56     Total Bilirubin 07/14/2023 0.3     Globulin 07/14/2023 2.7     A/G Ratio 07/14/2023 1.6     BUN/Creatinine Ratio 07/14/2023 18.9     Anion Gap 07/14/2023 11.2     eGFR 07/14/2023 97.8     Free T4 07/14/2023 1.49     TSH 07/14/2023 0.736     25 Hydroxy, Vitamin D 07/14/2023 61.7    Clinical Support on 04/25/2023    Component Date Value    Calcium 04/25/2023 9.5      Recent labs reviewed and interpreted for clinical significance and application            Level of Care:           Ramirez Kevin MD  10/10/23  .

## 2023-10-11 ENCOUNTER — TELEPHONE (OUTPATIENT)
Dept: CARDIOLOGY | Facility: CLINIC | Age: 73
End: 2023-10-11

## 2023-10-11 ENCOUNTER — CLINICAL SUPPORT (OUTPATIENT)
Dept: CARDIOLOGY | Facility: CLINIC | Age: 73
End: 2023-10-11
Payer: MEDICARE

## 2023-10-11 DIAGNOSIS — R00.0 TACHYCARDIA: Primary | ICD-10-CM

## 2023-10-11 DIAGNOSIS — R94.31 ABNORMAL EKG: Primary | ICD-10-CM

## 2023-10-11 DIAGNOSIS — R00.0 TACHYCARDIA: ICD-10-CM

## 2023-10-11 RX ORDER — METOPROLOL SUCCINATE 50 MG/1
50 TABLET, EXTENDED RELEASE ORAL DAILY
Qty: 30 TABLET | Refills: 5 | Status: SHIPPED | OUTPATIENT
Start: 2023-10-11

## 2023-10-11 NOTE — TELEPHONE ENCOUNTER
Caller: Eva Borrego    Relationship: Self    Best call back number: [phone] (home)      What orders are you requesting (i.e. lab or imaging): EKG     In what timeframe would the patient need to come in: ASAP    Where will you receive your lab/imaging services: FROM PCP DR STERLING WITH [facility]    Additional notes: PT CALLED PCP AND THEY SAID THEY COULD DO THE EKG IF ORDER WAS PLACED. PT STATED DR GUNDERSON HAD ASKED HER TO SEE IF PCP COULD DO EKG.

## 2023-10-11 NOTE — TELEPHONE ENCOUNTER
Hub staff attempted to follow warm transfer process and was unsuccessful     Caller: Eva Borrego    Relationship to patient: Self    Best call back number: [phone]    Patient is needing: PLEASE REACH OUT TO PATIENT FOR SCHEDULING EKG.

## 2023-10-11 NOTE — TELEPHONE ENCOUNTER
Caller: Eva Borrego    Relationship: Self    Best call back number: [phone]    What is the best time to reach you: ANYTIME     Who are you requesting to speak with (clinical staff, provider,  specific staff member): CLINICAL    What was the call regarding: PT CALLED IN THE GIVE BP READINGS. THIS MORNING AT 10AM IT /83 AND PULSE .     Is it okay if the provider responds through MyChart: PREFERS CALL BACK

## 2023-10-12 ENCOUNTER — TELEPHONE (OUTPATIENT)
Dept: CARDIOLOGY | Facility: CLINIC | Age: 73
End: 2023-10-12
Payer: MEDICARE

## 2023-10-12 NOTE — TELEPHONE ENCOUNTER
Please take a look at pt's referral from Dr Kevin for tachycardia and abnormal EKG. The next new pt appt is not until 11/7. Is this ok or can pt be worked in as an overbook sooner?

## 2023-10-16 NOTE — TELEPHONE ENCOUNTER
CHEL I CALLED PATIENT AND MADE APT 11/7/23. I DID OFFER APT 10/31, BUT SHE DID NOT WANT TO COME IN ON HALLOWEEN.     IF DR. MACK NEEDS TO SEE HER SOONER I CAN WORK HER IN, BUT THE SCHEDULES ARE VERY FULL AND PATIENT WAS FINE WITH  APT.

## 2023-10-24 ENCOUNTER — APPOINTMENT (OUTPATIENT)
Dept: GENERAL RADIOLOGY | Facility: HOSPITAL | Age: 73
End: 2023-10-24
Payer: MEDICARE

## 2023-10-24 ENCOUNTER — HOSPITAL ENCOUNTER (OUTPATIENT)
Facility: HOSPITAL | Age: 73
Discharge: HOME | End: 2023-10-24
Attending: EMERGENCY MEDICINE | Admitting: EMERGENCY MEDICINE
Payer: MEDICARE

## 2023-10-24 VITALS
SYSTOLIC BLOOD PRESSURE: 122 MMHG | DIASTOLIC BLOOD PRESSURE: 78 MMHG | HEIGHT: 63 IN | WEIGHT: 127 LBS | TEMPERATURE: 98.5 F | OXYGEN SATURATION: 96 % | HEART RATE: 88 BPM | BODY MASS INDEX: 22.5 KG/M2 | RESPIRATION RATE: 18 BRPM

## 2023-10-24 DIAGNOSIS — B34.9 VIRAL ILLNESS: ICD-10-CM

## 2023-10-24 DIAGNOSIS — J06.9 ACUTE UPPER RESPIRATORY INFECTION: Primary | ICD-10-CM

## 2023-10-24 LAB
FLUAV SUBTYP SPEC NAA+PROBE: NOT DETECTED
FLUBV RNA ISLT QL NAA+PROBE: NOT DETECTED
SARS-COV-2 RNA RESP QL NAA+PROBE: NOT DETECTED

## 2023-10-24 PROCEDURE — G0463 HOSPITAL OUTPT CLINIC VISIT: HCPCS | Performed by: EMERGENCY MEDICINE

## 2023-10-24 PROCEDURE — 71046 X-RAY EXAM CHEST 2 VIEWS: CPT

## 2023-10-24 PROCEDURE — 87636 SARSCOV2 & INF A&B AMP PRB: CPT

## 2023-10-24 RX ORDER — BENZONATATE 100 MG/1
100 CAPSULE ORAL 3 TIMES DAILY PRN
Qty: 20 CAPSULE | Refills: 0 | Status: SHIPPED | OUTPATIENT
Start: 2023-10-24

## 2023-10-24 RX ORDER — PREDNISONE 20 MG/1
40 TABLET ORAL DAILY
Qty: 10 TABLET | Refills: 0 | Status: SHIPPED | OUTPATIENT
Start: 2023-10-24 | End: 2023-10-29

## 2023-10-24 NOTE — FSED PROVIDER NOTE
Subjective   History of Present Illness  The patient is a 73-year-old with complaints of cough and productive cough.  Is been going on for a week.  Her son got sick 2 weeks ago.  Her son is also here.      Review of Systems   Constitutional: Negative.    HENT: Negative.  Negative for rhinorrhea and sore throat.    Eyes: Negative.    Respiratory:  Positive for cough. Negative for chest tightness and shortness of breath.    Cardiovascular: Negative.    Gastrointestinal: Negative.  Negative for abdominal pain, diarrhea, nausea and vomiting.   Endocrine: Negative.    Genitourinary: Negative.  Negative for dysuria.   Musculoskeletal:  Negative for back pain.   Skin: Negative.    Allergic/Immunologic: Negative.    Neurological: Negative.    Hematological: Negative.    Psychiatric/Behavioral: Negative.     All other systems reviewed and are negative.      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    Post-menopausal     Sacroiliac joint pain     Seasonal allergies     Telangiectasia        Allergies   Allergen Reactions    Erythromycin Anaphylaxis    Celebrex [Celecoxib] Diarrhea    Percocet [Oxycodone-Acetaminophen] Nausea Only and Dizziness       Past Surgical History:   Procedure Laterality Date    CHOLECYSTECTOMY      COLONOSCOPY      TA = 2018/ 2021, rech 2024             GSI    EYE SURGERY      B/L Cataract/ Lens Lt    JOINT REPLACEMENT Right     THR---2002/ 2017    MASS EXCISION Left 10/11/2022    Procedure: EXCISION MASS UPPER EXTREMITY, ELBOW;  Surgeon:  Jeremy Trinh MD;  Location: [facility] MAIN OR;  Service: Orthopedics;  Laterality: Left;    SPINE SURGERY      Discectomy Lumbar    TONSILLECTOMY         Family History   Problem Relation Age of Onset    Diabetes Mother     Hyperlipidemia Father     Aortic aneurysm Father     Alcohol abuse Sister     COPD Brother     Hypertension Brother     Arthritis Brother     Lung cancer Brother     Kidney disease Brother     Arthritis Maternal Grandmother     Osteoporosis Maternal Grandmother     Stroke Paternal Grandfather        Social History     Socioeconomic History    Marital status:    Tobacco Use    Smoking status: Former     Packs/day: 0.50     Years: 40.00     Additional pack years: 0.00     Total pack years: 20.00     Types: Cigarettes     Start date:      Quit date: 10/11/2012     Years since quittin.0     Passive exposure: Never    Smokeless tobacco: Never    Tobacco comments:     Vapes 3mL-8   Vaping Use    Vaping Use: Every day    Substances: Flavoring, menthol 12 mg    Devices: Refillable tank   Substance and Sexual Activity    Alcohol use: No    Drug use: No    Sexual activity: Defer           Objective   Physical Exam  Vitals and nursing note reviewed.   Constitutional:       Appearance: She is well-developed and normal weight.   HENT:      Head: Normocephalic and atraumatic.      Right Ear: External ear normal.      Left Ear: External ear normal.      Nose: Nose normal.      Mouth/Throat:      Mouth: Mucous membranes are moist.      Pharynx: Oropharynx is clear.   Eyes:      Extraocular Movements: Extraocular movements intact.      Pupils: Pupils are equal, round, and reactive to light.   Cardiovascular:      Rate and Rhythm: Normal rate and regular rhythm.      Pulses: Normal pulses.      Heart sounds: Normal heart sounds.   Pulmonary:      Effort: Pulmonary effort is normal.      Breath sounds: Normal breath sounds.   Abdominal:      General: Abdomen is flat.      Palpations: Abdomen is soft.       Tenderness: There is no abdominal tenderness.   Musculoskeletal:         General: Normal range of motion.      Cervical back: Normal range of motion and neck supple.   Skin:     General: Skin is warm and dry.   Neurological:      General: No focal deficit present.      Mental Status: She is alert and oriented to person, place, and time. Mental status is at baseline.   Psychiatric:         Mood and Affect: Mood normal.         Behavior: Behavior normal.         Thought Content: Thought content normal.         Judgment: Judgment normal.         Procedures           ED Course  ED Course as of 10/24/23 1812   Tue Oct 24, 2023   1809 XR Chest 2 View [CT]   1810 COVID-19 and FLU A/B PCR - Swab, Nasopharynx [CT]   1810 Flu and COVID-negative.  Chest x-ray negative. [CT]      ED Course User Index  [CT] Gus Burns MD                                           Medical Decision Making  This patient presents with symptoms suspicious for likely viral upper respiratory infection. Based on history and physical doubt sinusitis. COVID test was sent off and pending. Do not suspect underlying cardiopulmonary process. I considered, but think unlikely, dangerous causes of this patient's symptoms to include ACS, CHF or COPD exacerbations, pneumonia, pneumothorax. Patient is nontoxic appearing and not in need of emergent medical intervention.    The patient's chest x-ray showed no evidence of pneumonia.  The patient's COVID and flu test were negative.    Amount and/or Complexity of Data Reviewed  Labs:  Decision-making details documented in ED Course.  Radiology:  Decision-making details documented in ED Course.        Final diagnoses:   Acute upper respiratory infection   Viral illness       ED Disposition  ED Disposition       ED Disposition   Discharge    Condition   Stable    Comment   --               Shannan Larsen DO  [address]  [address]  [facility] [address]  [phone]    Schedule an appointment as soon as possible  for a visit in 2 days           Medication List        New Prescriptions      benzonatate 100 MG capsule  Commonly known as: TESSALON  Take 1 capsule by mouth 3 (Three) Times a Day As Needed for Cough.     predniSONE 20 MG tablet  Commonly known as: DELTASONE  Take 2 tablets by mouth Daily for 5 days.            Changed      Allegra-D Allergy & Congestion  MG per 12 hr tablet  Generic drug: fexofenadine-pseudoephedrine  TAKE ONE TABLET BY MOUTH TWICE A DAY AS NEEDED FOR ALLERGIES  What changed: See the new instructions.               Where to Get Your Medications        These medications were sent to [id number] [place], [place] - [address] [place] [address] [address]  - [phone]  - [phone] [address] [place] [address]      Phone: [phone]   benzonatate 100 MG capsule  predniSONE 20 MG tablet

## 2023-10-30 ENCOUNTER — HOSPITAL ENCOUNTER (OUTPATIENT)
Dept: ONCOLOGY | Facility: HOSPITAL | Age: 73
Discharge: HOME | End: 2023-10-30
Admitting: FAMILY MEDICINE
Payer: MEDICARE

## 2023-10-30 VITALS — HEART RATE: 79 BPM | DIASTOLIC BLOOD PRESSURE: 58 MMHG | SYSTOLIC BLOOD PRESSURE: 108 MMHG

## 2023-10-30 DIAGNOSIS — M81.0 SENILE OSTEOPOROSIS: Primary | ICD-10-CM

## 2023-10-30 PROCEDURE — 25010000002 DENOSUMAB 60 MG/ML SOLUTION PREFILLED SYRINGE: Performed by: FAMILY MEDICINE

## 2023-10-30 PROCEDURE — 96372 THER/PROPH/DIAG INJ SC/IM: CPT

## 2023-10-30 RX ADMIN — DENOSUMAB 60 MG: 60 INJECTION SUBCUTANEOUS at 14:56

## 2023-10-30 NOTE — PROGRESS NOTES
Patient here for Prolia injection. Patient received Prolia injection and tolerated injection well.  Patient to contact ordering M.D. for future appointment and patient verbalized understanding.

## 2023-11-03 DIAGNOSIS — M53.3 SACROILIAC JOINT PAIN: ICD-10-CM

## 2023-11-03 NOTE — TELEPHONE ENCOUNTER
Caller: Borrego Eva    Relationship: Self    Best call back number: [phone]    Requested Prescriptions:   Requested Prescriptions     Pending Prescriptions Disp Refills    HYDROcodone-acetaminophen (Norco) 7.5-325 MG per tablet 120 tablet 0     Sig: Take 1 tablet by mouth Every 6 (Six) Hours As Needed for Severe Pain.        Pharmacy where request should be sent: [facility] [id number] [place] [address] AT [facility] [address] [place] [address] - [phone] [facility] [phone] FX     Last office visit with prescribing clinician: 7/18/2023   Last telemedicine visit with prescribing clinician: Visit date not found   Next office visit with prescribing clinician: 1/23/2024     Does the patient have less than a 3 day supply:  [x] Yes  [] No    Bryce Henry Rep   11/03/23 12:41 EDT

## 2023-11-04 RX ORDER — HYDROCODONE BITARTRATE AND ACETAMINOPHEN 7.5; 325 MG/1; MG/1
1 TABLET ORAL EVERY 6 HOURS PRN
Qty: 120 TABLET | Refills: 0 | Status: SHIPPED | OUTPATIENT
Start: 2023-11-04

## 2023-11-07 ENCOUNTER — PATIENT ROUNDING (BHMG ONLY) (OUTPATIENT)
Dept: CARDIOLOGY | Facility: CLINIC | Age: 73
End: 2023-11-07

## 2023-11-07 ENCOUNTER — OFFICE VISIT (OUTPATIENT)
Dept: CARDIOLOGY | Facility: CLINIC | Age: 73
End: 2023-11-07
Payer: MEDICARE

## 2023-11-07 VITALS
BODY MASS INDEX: 21.79 KG/M2 | WEIGHT: 123 LBS | DIASTOLIC BLOOD PRESSURE: 87 MMHG | HEART RATE: 90 BPM | OXYGEN SATURATION: 97 % | SYSTOLIC BLOOD PRESSURE: 117 MMHG | HEIGHT: 63 IN

## 2023-11-07 DIAGNOSIS — R00.0 SINUS TACHYCARDIA: Primary | ICD-10-CM

## 2023-11-07 PROCEDURE — 1160F RVW MEDS BY RX/DR IN RCRD: CPT | Performed by: INTERNAL MEDICINE

## 2023-11-07 PROCEDURE — 1159F MED LIST DOCD IN RCRD: CPT | Performed by: INTERNAL MEDICINE

## 2023-11-07 PROCEDURE — 99202 OFFICE O/P NEW SF 15 MIN: CPT | Performed by: INTERNAL MEDICINE

## 2023-11-07 NOTE — PROGRESS NOTES
HP      Name: Eva Borrego ADMIT: (Not on file)   : 1950  PCP: Shannan Larsen DO    MRN: [id number] LOS: 0 days   AGE/SEX: 73 y.o. female  ROOM: Room/bed info not found     No chief complaint on file.      Subjective        History of present illness  Eva Borrego is a 73-year-old female patient who is here today for evaluation of tachycardia.  She does have COPD, no established CAD.  She has been feeling palpitations and she was started on metoprolol by Dr. Kevin and she has seen significant improvement in that regard.  Denies any syncopal episodes.  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    Post-menopausal     Sacroiliac joint pain     Seasonal allergies     Telangiectasia      Past Surgical History:   Procedure Laterality Date    CHOLECYSTECTOMY      COLONOSCOPY      TA = 2021, rech              GSI    EYE SURGERY      B/L Cataract/ Lens Lt    JOINT REPLACEMENT Right     THR---2017    MASS EXCISION Left 10/11/2022    Procedure: EXCISION MASS UPPER EXTREMITY, ELBOW;  Surgeon: Jeremy Trinh MD;  Location: [facility] MAIN OR;  Service: Orthopedics;  Laterality: Left;    SPINE SURGERY      Discectomy Lumbar    TONSILLECTOMY       Family History   Problem Relation Age of Onset    Diabetes Mother     Hyperlipidemia Father     Aortic aneurysm Father     Alcohol abuse Sister     COPD Brother     Hypertension Brother     Arthritis Brother     Lung cancer Brother     Kidney disease Brother      Arthritis Maternal Grandmother     Osteoporosis Maternal Grandmother     Stroke Paternal Grandfather      Social History     Tobacco Use    Smoking status: Former     Packs/day: 0.50     Years: 40.00     Additional pack years: 0.00     Total pack years: 20.00     Types: Cigarettes     Start date:      Quit date: 10/11/2012     Years since quittin.0     Passive exposure: Never    Smokeless tobacco: Never    Tobacco comments:     Vapes 3mL-8   Vaping Use    Vaping Use: Every day    Substances: Flavoring, menthol 12 mg    Devices: [employer]   Substance Use Topics    Alcohol use: No    Drug use: No     (Not in a hospital admission)    Allergies:  Erythromycin, Celebrex [celecoxib], and Percocet [oxycodone-acetaminophen]    Review of systems    Constitutional: Negative.    Respiratory and cardiovascular: As detailed in HPI section.  Gastrointestinal: Negative for constipation, nausea and vomiting negative for abdominal distention, abdominal pain and diarrhea.   Genitourinary: Negative for difficulty urinating and flank pain.   Musculoskeletal: Negative for arthralgias, joint swelling and myalgias.   Skin: Negative for color change, rash and wound.   Neurological: Negative for dizziness, syncope, weakness and headaches.   Hematological: Negative for adenopathy.   Psychiatric/Behavioral: Negative for confusion.   All other systems reviewed and are negative.    Physical Exam  VITALS REVIEWED    General:      well developed, in no acute distress.    Head:      normocephalic and atraumatic.    Eyes:      PERRL/EOM intact, conjunctiva and sclera clear with out nystagmus.    Neck:      no masses, thyromegaly,  trachea central with normal respiratory effort and PMI displaced laterally  Lungs:      Clear to auscultation bilaterally  Heart:       Regular rate and rhythm  Msk:      no deformity or scoliosis noted of thoracic or lumbar spine.    Pulses:      pulses normal in all 4 extremities.    Extremities:       No  lower extremity edema  Neurologic:      no focal deficits.   alert oriented x3  Skin:      intact without lesions or rashes.    Psych:      alert and cooperative; normal mood and affect; normal attention span and concentration.      Result Review :               Pertinent cardiac workup    EKG 10/11/2023 sinus rhythm, Q waves in inferior leads.  Stress test 8/21/2023 showed no ischemic changes, did show fixed defect in the inferior inferoseptal wall.  Infarction versus attenuation artifact.  Echo 8/17/2023 ejection fraction 50 to 55%.      Procedures        Assessment and Plan      Eva Borrego is a 73-year-old female patient who is referred for abnormal EKG and tachycardia.  I did look at multiple EKGs in the chart, though the P waves have a rather unusual morphology, the rhythm is sinus nonetheless.  She did have few PVCs during her stress test but other EKGs did not show PVCs.  Patient did wear a monitor and turned it then but it is not uploaded yet.  She has not received any alerts from it.  Furthermore she feels better with addition of metoprolol.  I do not anticipate any further work-up at this time, I would like to see her once for a follow-up and at that time I will review tracings from event monitor with her.    Diagnoses and all orders for this visit:    1. Sinus tachycardia (Primary)           Return in about 2 months (around 1/7/2024).  Patient was given instructions and counseling regarding her condition or for health maintenance advice. Please see specific information pulled into the AVS if appropriate.

## 2023-11-30 ENCOUNTER — OFFICE VISIT (OUTPATIENT)
Dept: CARDIOLOGY | Facility: CLINIC | Age: 73
End: 2023-11-30
Payer: MEDICARE

## 2023-11-30 VITALS
BODY MASS INDEX: 21.79 KG/M2 | HEART RATE: 89 BPM | WEIGHT: 123 LBS | HEIGHT: 63 IN | DIASTOLIC BLOOD PRESSURE: 75 MMHG | RESPIRATION RATE: 18 BRPM | SYSTOLIC BLOOD PRESSURE: 110 MMHG

## 2023-11-30 DIAGNOSIS — R00.0 SINUS TACHYCARDIA: Primary | ICD-10-CM

## 2023-11-30 DIAGNOSIS — Z13.220 SCREENING FOR CHOLESTEROL LEVEL: ICD-10-CM

## 2023-11-30 DIAGNOSIS — R94.31 ABNORMAL EKG: ICD-10-CM

## 2023-11-30 DIAGNOSIS — E78.49 OTHER HYPERLIPIDEMIA: ICD-10-CM

## 2023-11-30 PROCEDURE — 99214 OFFICE O/P EST MOD 30 MIN: CPT | Performed by: INTERNAL MEDICINE

## 2023-12-07 NOTE — PROGRESS NOTES
Cardiology Clinic Note  Ramirez Kevin MD, PhD    Subjective:     Encounter Date:11/30/2023      Patient ID: Eva Borrego is a 73 y.o. female.    Chief Complaint:  Chief Complaint   Patient presents with    Follow-up       HPI:      I the pleasure to see this 73-year-old female in follow-up, initially referred for palpitations and dyspnea on exertion atypical chest pain.  She has advanced panlobular emphysema, long smoking history on multiple bronchodilators.  Her resting heart rate is in the 110s initially on encounter, EKG at that time abnormal with sinus tachycardia, right axis deviation, possible Q waves in the inferior leads possible old inferior MI possibly silent event in the past.  She has lower extremity edema of 1-2+ from the shins to the ankles at that time, NYHA class III shortness of breath no unstable angina symptoms.     Testing was performed.  Intermediate risk studies, moderate risk calcium score, preserved EF by 2D echo.  we discussed if she has chest discomfort or further dyspnea on exertion, left heart catheterization would be warranted for definitive evaluation an IFR guided approach for any suspect CAD given abnormal calcium score of mostly a in the left main as well as LAD.    On follow-up her shortness of breath is really unchanged.  She is not smoking blood pressure controlled her weight is stable and her edema is stable peripherally which is minimal.      Stress testing August2023   Abnormal baseline stress ECG concerning for possible Q waves in inferior leads with poor R wave progression  There were no ischemic changes at maximal stress or recovery  There were occasional PVCs and quadrigeminy in the study  Nuclear perfusion demonstrated fixed defects in the inferior and inferoseptal wall as well as inferoapical portion cannot rule out diaphragmatic or GI attenuation  Minimal reversibility seen to suggest ischemia  Cannot rule out prior infarct versus attenuation artifact  There is  normal wall motion of the affected segments indicating possible more attenuation artifact   Intermediate risk study by criteria      2D echo August 2023      Normal EF 55 to 60%  no specific regional abnormalities identified  Normal atrial sizes  Normal RV size and function  No masses or effusions  Trace to mild valvular insufficiency as documented  Grade 1 diastolic dysfunction  Normal fully collapsible IVC, normal right atrial pressure  No Doppler evidence for ASD or PFO  Ectopy present on exam with PVCs     Calcium scoring August 2023   Findings:  Agatston scores for individual coronary arteries are as follows:  Left main (LM): 121.2  Left anterior descending (LAD): 132.6  Left circumflex (CX): 12.1  Right coronary artery (RCA): 0  Total: 265.9Moderate risk      Review of systems otherwise negative x 14 point review of systems except as mentioned above      Physical exam    Vitals reviewed below  Regular rate and rhythm no rubs gallops heave or lift 106 systolic ejection murmur left sternal border faintly which is benign  No heave or lift  No clubbing cyanosis with trace edema only  Normal radial pulses normal cap refill  No carotid bruits or JVD  Skin is warm and dry  Intact grossly  Clear to auscultation with delayed expiratory phase  No rales rhonchi's or wheezes  Soft nontender nondistended                 Assessment and plan per my encounter  Abnormal EKG  Shortness of air dyspnea on exertion, largely related to COPD but could be multifactorial  Peripheral edema stable  Continue metoprolol succinate 50 daily heart rates are improved in the 80s  Grade 1 diastolic dysfunction fully collapsible IVC, volume not contributing to shortness of air  Resting tachycardia possibly pulmonary related, better with metoprolol as above  Abnormal calcium score with at least moderate atherosclerotic disease, secondary prevention goals  Abnormal stress test intermediate risk study, will defer for another 3 months and see her  "back, if has worsening dyspnea exertion left heart catheterization for definitive evaluation of coronary anatomy as well as estimation of filling pressures  Preserved EF by echo 55-60%      LDL 92, normal LFTs creatinine electrolytes  Increase simvastatin to 40 daily previously, goal LDL is less than 70  Will consider Zetia additionally       go to the ER for any unstable symptoms of chest pain shortness of breath or otherwise      Could consider Plavix monotherapy as superior aspirin for CV risk reduction, will defer at this time, would prefer low-dose aspirin and at least     9-month follow-up  Ramirez Kevin MD, PhD    Objective:         /75 (BP Location: Left arm, Patient Position: Sitting)   Pulse 89   Resp 18   Ht 160 cm (63\")   Wt 55.8 kg (123 lb)   BMI 21.79 kg/m²         The pleasure to be involved in this patient's cardiovascular care.  Please call with any questions or concerns  Ramirez Kevin MD, PhD    Most recent EKG as reviewed and interpreted by me:  Procedures     Most recent echo as reviewed and interpreted by me:  Results for orders placed during the hospital encounter of 08/16/23    Adult Transthoracic Echo Complete W/ Color, Spectral and Contrast if Necessary Per Protocol    Interpretation Summary    Left ventricular systolic function is normal. Left ventricular ejection fraction appears to be 51 - 55%.    Left ventricular diastolic function is consistent with (grade I) impaired relaxation.    Estimated right ventricular systolic pressure from tricuspid regurgitation is normal (<35 mmHg).    Normal EF 55 to 60%  no specific regional abnormalities identified  Normal atrial sizes  Normal RV size and function  No masses or effusions  Trace to mild valvular insufficiency as documented  Grade 1 diastolic dysfunction  Normal fully collapsible IVC, normal right atrial pressure  No Doppler evidence for ASD or PFO  Ectopy present on exam with PVCs      Most recent stress test as reviewed and " interpreted by me:  Results for orders placed during the hospital encounter of 08/21/23    Stress Test With Myocardial Perfusion One Day    Interpretation Summary    Left ventricular ejection fraction is normal (Calculated EF = 67%).    Nuclear imaging demonstrates decreased counts in the inferior wall and inferoseptum at baseline as well as stress Is also involvement of the inferoapical portion Cannot rule out significant diaphragmatic or GI attenuation Summed difference score of only 2 in juxtaposed segments, minimal reversibility identified with fixed defects Cannot rule out prior nontransmural infarct versus attenuation artifact, there is normal wall thickening in the segments with no regional abnormality.    Findings consistent with an equivocal ECG stress test.    Abnormal baseline stress ECG concerning for possible Q waves in inferior leads with poor R wave progression  There were no ischemic changes at maximal stress or recovery  There were occasional PVCs and quadrigeminy in the study  Nuclear perfusion demonstrated fixed defects in the inferior and inferoseptal wall as well as inferoapical portion cannot rule out diaphragmatic or GI attenuation  Minimal reversibility seen to suggest ischemia  Cannot rule out prior infarct versus attenuation artifact  There is normal wall motion of the affected segments indicating possible more attenuation artifact    Intermediate risk study noting decreased counts but otherwise no reversibility seen, correlate with clinical symptoms would be recommended      Most recent cardiac catheterization as reviewed interpreted by me:  No results found for this or any previous visit.    The following portions of the patient's history were reviewed and updated as appropriate: allergies, current medications, past family history, past medical history, past social history, past surgical history, and problem list.      ROS:  14 point review of systems negative except as mentioned  above    Current Outpatient Medications:     albuterol sulfate HFA (Ventolin HFA) 108 (90 Base) MCG/ACT inhaler, Inhale 2 puffs Every 6 (Six) Hours As Needed for Wheezing or Shortness of Air., Disp: 18 g, Rfl: 0    ALLEGRA-D ALLERGY & CONGESTION  MG per 12 hr tablet, TAKE ONE TABLET BY MOUTH TWICE A DAY AS NEEDED FOR ALLERGIES (Patient taking differently: Take 1 tablet by mouth Daily.), Disp: 60 tablet, Rfl: 0    Breztri Aerosphere 160-9-4.8 MCG/ACT aerosol inhaler, Inhale 2 puffs 2 (Two) Times a Day., Disp: 32.1 g, Rfl: 3    [employer]S IBUPROFEN PO, PRN, Disp: , Rfl:     denosumab (Prolia) 60 MG/ML solution prefilled syringe syringe, Inject 1 mL under the skin into the appropriate area as directed Every 6 (Six) Months., Disp: 180 mL, Rfl: 1    docusate sodium (COLACE) 50 MG capsule, Take 1 capsule by mouth Daily. (Patient taking differently: Take 1 capsule by mouth 2 (Two) Times a Day As Needed.), Disp: 30 capsule, Rfl:     HYDROcodone-acetaminophen (Norco) 7.5-325 MG per tablet, Take 1 tablet by mouth Every 6 (Six) Hours As Needed for Severe Pain., Disp: 120 tablet, Rfl: 0    hydrocortisone 2.5 % cream, Apply 1 application  topically to the appropriate area as directed 2 (Two) Times a Day As Needed for Irritation., Disp: 28 g, Rfl: 0    ipratropium-albuterol (DUO-NEB) 0.5-2.5 mg/3 ml nebulizer, Take 3 mL by nebulization 4 (Four) Times a Day As Needed for Wheezing or Shortness of Air., Disp: 360 mL, Rfl: 1    levothyroxine (SYNTHROID, LEVOTHROID) 100 MCG tablet, Take 1 tablet by mouth Daily., Disp: 90 tablet, Rfl: 2    metoprolol succinate XL (TOPROL-XL) 50 MG 24 hr tablet, Take 1 tablet by mouth Daily., Disp: 30 tablet, Rfl: 5    multivitamin with minerals tablet tablet, Take 1 tablet by mouth Daily., Disp: , Rfl:     omeprazole (priLOSEC) 20 MG capsule, Take 1 capsule by mouth Daily., Disp: 90 capsule, Rfl: 1    simvastatin (Zocor) 40 MG tablet, Take 1 tablet by mouth Every Night., Disp: 90 tablet, Rfl: 3     vitamin D3 125 MCG (5000 UT) capsule capsule, Take 1 capsule by mouth Every Other Day. 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    Post-menopausal     Sacroiliac joint pain     Seasonal allergies     Telangiectasia      Past Surgical History:  Past Surgical History:   Procedure Laterality Date    CHOLECYSTECTOMY      COLONOSCOPY      TA = 2018/ 2021, rech 2024             GSI    EYE SURGERY      B/L Cataract/ Lens Lt    JOINT REPLACEMENT Right     THR---2002/ 2017    MASS EXCISION Left 10/11/2022    Procedure: EXCISION MASS UPPER EXTREMITY, ELBOW;  Surgeon: Jeremy Trinh MD;  Location: [facility] MAIN OR;  Service: Orthopedics;  Laterality: Left;    SPINE SURGERY      Discectomy Lumbar    TONSILLECTOMY       Social History:  Social History     Socioeconomic History     Marital status:    Tobacco Use    Smoking status: Former     Packs/day: 0.50     Years: 40.00     Additional pack years: 0.00     Total pack years: 20.00     Types: Cigarettes     Start date:      Quit date: 10/11/2012     Years since quittin.1     Passive exposure: Never    Smokeless tobacco: Never    Tobacco comments:     Vapes 3mL-8   Vaping Use    Vaping Use: Every day    Substances: Flavoring, menthol 12 mg    Devices: Refillable tank   Substance and Sexual Activity    Alcohol use: No    Drug use: No    Sexual activity: Defer     Allergies:  Allergies   Allergen Reactions    Erythromycin Anaphylaxis    Celebrex [Celecoxib] Diarrhea    Percocet [Oxycodone-Acetaminophen] Nausea Only and Dizziness     Immunizations:  Immunization History   Administered Date(s) Administered    COVID-19 (PFIZER) Purple Cap Monovalent 2021, 2021, 2021    FLUAD TRI 65YR+ 2019    Fluad Quad 65+ 2019, 10/07/2020    Fluzone High Dose =>65 Years ([employer] ONLY) 2016, 2018    Fluzone High-Dose 65+yrs 02/10/2022, 10/28/2022    Influenza Seasonal Injectable 2015, 10/20/2017    Influenza TIV (IM) 2011, 2012, 10/15/2013, 2014    Pneumococcal Conjugate 13-Valent (PCV13) 2021    Pneumococcal Polysaccharide (PPSV23) 10/12/2018    Shingrix 2023    Td (TDVAX) 2011            In-Office Procedure(s):  No orders to display        ASCVD RIsk Score::  The 10-year ASCVD risk score (Dorothy CARLISLE, et al., 2019) is: 9.5%    Values used to calculate the score:      Age: 73 years      Sex: Female      Is Non- : No      Diabetic: No      Tobacco smoker: No      Systolic Blood Pressure: 110 mmHg      Is BP treated: No      HDL Cholesterol: 60 mg/dL      Total Cholesterol: 168 mg/dL    Imaging:    Results for orders placed during the hospital encounter of 10/24/23    XR Chest 2 View    Narrative  XR CHEST 2 VW    Date of Exam: 10/24/2023 5:11  PM EDT    Indication: Cough    Comparison: 10/7/2022.    Findings:  The heart size is normal. The pulmonary vascular markings are normal. There are dense nodules in the right upper lobe consistent with calcified granulomas. The lungs and pleural spaces are clear of active disease. There are chronic age-related changes  involving the bony thorax and thoracic aorta.    Impression  Impression:  No active disease.      Electronically Signed: Jay Go MD  10/24/2023 5:24 PM EDT  Workstation ID: [id number]       Results for orders placed during the hospital encounter of 08/31/23    CT Cardiac Calcium Score Without Dye    Narrative  CT CARDIAC CALCIUM SCORE WO DYE    Date of Exam: 8/31/2023 1:39 PM EDT    Indication: abn ekg.    Comparison: None available.    Technique: Multiple thin section CT axial images were obtained with prospective ECG-gating without intravenous contrast.    Findings:  Agatston scores for individual coronary arteries are as follows:  Left main (LM): 121.2  Left anterior descending (LAD): 132.6  Left circumflex (CX): 12.1  Right coronary artery (RCA): 0  Total: 265.9    Visualized lungs are clear.    Impression  Impression:  Total coronary artery calcium score is 265.9.    Calcium Score Interpretation  0 -- Negative examination, no identifiable atherosclerotic plaque.  Very low risk of cardiac events in the next 5 years.  1-10 -- Minimal plaque burden.  Low risk of cardiac events in the next 5 years.  11- 100 -- Mild Plaque burden.  Mild risk of cardiac events in the next 5 years.  101 - 400 -- Moderate plaque burden.  Moderate risk of cardiac events in the next 5 years.  Over 400 -- Extensive plaque burden.  High risk of cardiac events in the next 5 years.      Electronically Signed: Henry Estrada MD  8/31/2023 8:15 PM EDT  Workstation ID: [id number]      Results for orders placed during the hospital encounter of 08/31/23    CT Cardiac Calcium Score Without Dye    Narrative  CT CARDIAC CALCIUM  SCORE WO DYE    Date of Exam: 8/31/2023 1:39 PM EDT    Indication: abn ekg.    Comparison: None available.    Technique: Multiple thin section CT axial images were obtained with prospective ECG-gating without intravenous contrast.    Findings:  Agatston scores for individual coronary arteries are as follows:  Left main (LM): 121.2  Left anterior descending (LAD): 132.6  Left circumflex (CX): 12.1  Right coronary artery (RCA): 0  Total: 265.9    Visualized lungs are clear.    Impression  Impression:  Total coronary artery calcium score is 265.9.    Calcium Score Interpretation  0 -- Negative examination, no identifiable atherosclerotic plaque.  Very low risk of cardiac events in the next 5 years.  1-10 -- Minimal plaque burden.  Low risk of cardiac events in the next 5 years.  11- 100 -- Mild Plaque burden.  Mild risk of cardiac events in the next 5 years.  101 - 400 -- Moderate plaque burden.  Moderate risk of cardiac events in the next 5 years.  Over 400 -- Extensive plaque burden.  High risk of cardiac events in the next 5 years.      Electronically Signed: Henry Estrada MD  8/31/2023 8:15 PM EDT  Workstation ID: [id number]      Lab Review:   Admission on 10/24/2023, Discharged on 10/24/2023   Component Date Value    COVID19 10/24/2023 Not Detected     Influenza A PCR 10/24/2023 Not Detected     Influenza B PCR 10/24/2023 Not Detected    Hospital Outpatient Visit on 08/21/2023   Component Date Value     CV STRESS PROTOCOL 1 08/21/2023 Pharmacologic     Stage 1 08/21/2023 1.0     HR Stage 1 08/21/2023 108     BP Stage 1 08/21/2023 129/68     Duration Min Stage 1 08/21/2023 0     Duration Sec Stage 1 08/21/2023 10     Stress Dose Regadenoson * 08/21/2023 0.40     Stress Comments Stage 1 08/21/2023 10 sec bolus injection     Stage 2 08/21/2023 2.0     HR Stage 2 08/21/2023 109     BP Stage 2 08/21/2023 129/68     Duration Min Stage 2 08/21/2023 4     Duration Sec Stage 2 08/21/2023 0     Stress Comments Stage 2  08/21/2023 recovery     Stage 3 08/21/2023 3.0     HR Stage 3 08/21/2023 108     BP Stage 3 08/21/2023 129/73     Baseline HR 08/21/2023 98     Baseline BP 08/21/2023 129/68     Peak HR 08/21/2023 109     Peak BP 08/21/2023 138/84     Recovery HR 08/21/2023 107     Recovery BP 08/21/2023 138/84     Target HR (85%) 08/21/2023 125     Max. Pred. HR (100%) 08/21/2023 147     Percent Max Pred HR 08/21/2023 74.15     Percent Target HR 08/21/2023 87     BH CV REST NUCLEAR ISOTO* 08/21/2023 11.0     BH CV STRESS NUCLEAR ISO* 08/21/2023 33.0     Nuc Stress EF 08/21/2023 67    Lab on 08/21/2023   Component Date Value    Total Cholesterol 08/21/2023 168     Triglycerides 08/21/2023 84     HDL Cholesterol 08/21/2023 60     LDL Cholesterol  08/21/2023 92     VLDL Cholesterol 08/21/2023 16     LDL/HDL Ratio 08/21/2023 1.52     Glucose 08/21/2023 116 (H)     BUN 08/21/2023 13     Creatinine 08/21/2023 0.53 (L)     Sodium 08/21/2023 141     Potassium 08/21/2023 4.6     Chloride 08/21/2023 101     CO2 08/21/2023 30.0 (H)     Calcium 08/21/2023 10.8 (H)     Total Protein 08/21/2023 7.5     Albumin 08/21/2023 5.0     ALT (SGPT) 08/21/2023 15     AST (SGOT) 08/21/2023 21     Alkaline Phosphatase 08/21/2023 66     Total Bilirubin 08/21/2023 0.4     Globulin 08/21/2023 2.5     A/G Ratio 08/21/2023 2.0     BUN/Creatinine Ratio 08/21/2023 24.5     Anion Gap 08/21/2023 10.0     eGFR 08/21/2023 97.8     TSH 08/21/2023 0.507     WBC 08/21/2023 8.86     RBC 08/21/2023 4.80     Hemoglobin 08/21/2023 14.8     Hematocrit 08/21/2023 44.5     MCV 08/21/2023 92.7     MCH 08/21/2023 30.8     MCHC 08/21/2023 33.3     RDW 08/21/2023 12.3     RDW-SD 08/21/2023 41.8     MPV 08/21/2023 10.2     Platelets 08/21/2023 281     Neutrophil % 08/21/2023 75.5     Lymphocyte % 08/21/2023 15.9 (L)     Monocyte % 08/21/2023 7.0     Eosinophil % 08/21/2023 0.8     Basophil % 08/21/2023 0.5     Immature Grans % 08/21/2023 0.3     Neutrophils, Absolute 08/21/2023 6.69      Lymphocytes, Absolute 08/21/2023 1.41     Monocytes, Absolute 08/21/2023 0.62     Eosinophils, Absolute 08/21/2023 0.07     Basophils, Absolute 08/21/2023 0.04     Immature Grans, Absolute 08/21/2023 0.03     nRBC 08/21/2023 0.0     Color, UA 08/21/2023 Yellow     Appearance, UA 08/21/2023 Clear     pH, UA 08/21/2023 6.5     Specific Gravity, UA 08/21/2023 1.006     Glucose, UA 08/21/2023 Negative     Ketones, UA 08/21/2023 Negative     Bilirubin, UA 08/21/2023 Negative     Blood, UA 08/21/2023 Negative     Protein, UA 08/21/2023 Negative     Leuk Esterase, UA 08/21/2023 Negative     Nitrite, UA 08/21/2023 Negative     Urobilinogen, UA 08/21/2023 0.2 E.U./dL     RBC, UA 08/21/2023 0-2     WBC, UA 08/21/2023 None Seen     Bacteria, UA 08/21/2023 None Seen     Squamous Epithelial Cell* 08/21/2023 0-2     Hyaline Casts, UA 08/21/2023 None Seen     Methodology 08/21/2023 Automated Microscopy    Hospital Outpatient Visit on 08/16/2023   Component Date Value    LVIDd 08/16/2023 3.3     LVIDs 08/16/2023 2.37     IVSd 08/16/2023 0.95     LVPWd 08/16/2023 0.96     FS 08/16/2023 27.5     IVS/LVPW 08/16/2023 1.00     ESV(cubed) 08/16/2023 13.3     LV Sys Vol (BSA correcte* 08/16/2023 17.8     EDV(cubed) 08/16/2023 34.8     LV Mahmood Vol (BSA correct* 08/16/2023 39.0     LVOT area 08/16/2023 3.9     LV mass(C)d 08/16/2023 86.9     LVOT diam 08/16/2023 2.22     EDV(MOD-sp4) 08/16/2023 62.5     ESV(MOD-sp4) 08/16/2023 28.4     SV(MOD-sp4) 08/16/2023 34.0     SI(MOD-sp4) 08/16/2023 21.3     EF(MOD-sp4) 08/16/2023 54.5     MV E max deandre 08/16/2023 50.3     MV A max deandre 08/16/2023 84.2     MV dec time 08/16/2023 0.19     MV E/A 08/16/2023 0.60     SV(LVOT) 08/16/2023 39.0     RVIDd 08/16/2023 2.48     LA dimension (2D)  08/16/2023 3.3     Pulm Sys Deandre 08/16/2023 36.2     Pulm Mahmood Deandre 08/16/2023 61.9     Pulm S/D 08/16/2023 0.59     LV V1 max 08/16/2023 67.9     LV V1 max PG 08/16/2023 1.85     LV V1 mean PG 08/16/2023 0.97     LV  V1 VTI 08/16/2023 10.1     Ao pk myles 08/16/2023 72.4     Ao max PG 08/16/2023 2.10     Ao mean PG 08/16/2023 1.21     Ao V2 VTI 08/16/2023 10.6     FAROOQ(I,D) 08/16/2023 3.7     MV max PG 08/16/2023 3.4     MV mean PG 08/16/2023 1.42     MV V2 VTI 08/16/2023 15.6     MVA(VTI) 08/16/2023 2.49     MV dec slope 08/16/2023 265.3     MR max myles 08/16/2023 405.8     MR max PG 08/16/2023 65.9     RV V1 max PG 08/16/2023 1.38     RV V1 max 08/16/2023 58.6     RV V1 VTI 08/16/2023 8.8     PA V2 max 08/16/2023 69.4     PA acc time 08/16/2023 0.14     PI end-d myles 08/16/2023 88.7     Ao root diam 08/16/2023 3.6     ACS 08/16/2023 2.29     EF(MOD-bp) 08/16/2023 54.5    Clinical Support on 07/14/2023   Component Date Value    Total Cholesterol 07/14/2023 156     Triglycerides 07/14/2023 88     HDL Cholesterol 07/14/2023 50     LDL Cholesterol  07/14/2023 89     VLDL Cholesterol 07/14/2023 17     LDL/HDL Ratio 07/14/2023 1.77     Glucose 07/14/2023 102 (H)     BUN 07/14/2023 10     Creatinine 07/14/2023 0.53 (L)     Sodium 07/14/2023 142     Potassium 07/14/2023 3.9     Chloride 07/14/2023 102     CO2 07/14/2023 28.8     Calcium 07/14/2023 9.6     Total Protein 07/14/2023 6.9     Albumin 07/14/2023 4.2     ALT (SGPT) 07/14/2023 13     AST (SGOT) 07/14/2023 22     Alkaline Phosphatase 07/14/2023 56     Total Bilirubin 07/14/2023 0.3     Globulin 07/14/2023 2.7     A/G Ratio 07/14/2023 1.6     BUN/Creatinine Ratio 07/14/2023 18.9     Anion Gap 07/14/2023 11.2     eGFR 07/14/2023 97.8     Free T4 07/14/2023 1.49     TSH 07/14/2023 0.736     25 Hydroxy, Vitamin D 07/14/2023 61.7      Recent labs reviewed and interpreted for clinical significance and application            Level of Care:           Ramirez Kevin MD  12/07/23  .

## 2023-12-14 DIAGNOSIS — M53.3 SACROILIAC JOINT PAIN: ICD-10-CM

## 2023-12-14 RX ORDER — HYDROCODONE BITARTRATE AND ACETAMINOPHEN 7.5; 325 MG/1; MG/1
1 TABLET ORAL EVERY 6 HOURS PRN
Qty: 120 TABLET | Refills: 0 | Status: SHIPPED | OUTPATIENT
Start: 2023-12-14

## 2023-12-14 NOTE — TELEPHONE ENCOUNTER
Caller: Eva Borrego    Relationship: Self    Best call back number: [phone]    Requested Prescriptions:   Requested Prescriptions     Pending Prescriptions Disp Refills    HYDROcodone-acetaminophen (Norco) 7.5-325 MG per tablet 120 tablet 0     Sig: Take 1 tablet by mouth Every 6 (Six) Hours As Needed for Severe Pain.      Pharmacy where request should be sent: [facility] [id number] [place] [address] AT [facility] [address] [place] [address] - [phone] [facility] [phone] FX     Last office visit with prescribing clinician: 7/18/2023   Last telemedicine visit with prescribing clinician: Visit date not found   Next office visit with prescribing clinician: 1/23/2024     Additional details provided by patient: PT HAS 1 DAY LEFT OF MEDICATION.     Does the patient have less than a 3 day supply:  [x] Yes  [] No    Would you like a call back once the refill request has been completed: [] Yes [x] No    If the office needs to give you a call back, can they leave a voicemail: [] Yes [] No    Bryce Rangel Rep   12/14/23 09:13 EST

## 2024-01-24 ENCOUNTER — OFFICE VISIT (OUTPATIENT)
Dept: CARDIOLOGY | Facility: CLINIC | Age: 74
End: 2024-01-24
Payer: MEDICARE

## 2024-01-24 VITALS
HEIGHT: 63 IN | SYSTOLIC BLOOD PRESSURE: 120 MMHG | HEART RATE: 95 BPM | WEIGHT: 121.6 LBS | BODY MASS INDEX: 21.55 KG/M2 | DIASTOLIC BLOOD PRESSURE: 69 MMHG | OXYGEN SATURATION: 95 %

## 2024-01-24 DIAGNOSIS — R00.0 SINUS TACHYCARDIA: Primary | ICD-10-CM

## 2024-01-24 PROCEDURE — 1159F MED LIST DOCD IN RCRD: CPT | Performed by: INTERNAL MEDICINE

## 2024-01-24 PROCEDURE — 1160F RVW MEDS BY RX/DR IN RCRD: CPT | Performed by: INTERNAL MEDICINE

## 2024-01-24 PROCEDURE — 99212 OFFICE O/P EST SF 10 MIN: CPT | Performed by: INTERNAL MEDICINE

## 2024-01-24 NOTE — PROGRESS NOTES
Progress note      Name: Eva Borrego ADMIT: (Not on file)   : 1950  PCP: Shannan Larsen DO    MRN: [id number] LOS: 0 days   AGE/SEX: 73 y.o. female  ROOM: Room/bed info not found     Chief Complaint   Patient presents with    Follow-up     2 month f/u        Subjective       History of present illness  Eva Borrego is a 73-year-old female patient who has COPD, no significant CAD, is here today for follow-up.  Previously had seen her for sinus tachycardia.  Patient was placed on Toprol and she is feeling better now.    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    Post-menopausal     Sacroiliac joint pain     Seasonal allergies     Telangiectasia      Past Surgical History:   Procedure Laterality Date    CHOLECYSTECTOMY      COLONOSCOPY      TA = 2021, rech              GSI    EYE SURGERY      B/L Cataract/ Lens Lt    JOINT REPLACEMENT Right     THR---2017    MASS EXCISION Left 10/11/2022    Procedure: EXCISION MASS UPPER EXTREMITY, ELBOW;  Surgeon: Jeremy Trinh MD;  Location: [facility] MAIN OR;  Service: Orthopedics;  Laterality: Left;    SPINE SURGERY      Discectomy Lumbar    TONSILLECTOMY       Family History   Problem Relation Age of Onset    Diabetes Mother     Hyperlipidemia Father     Aortic aneurysm Father     Alcohol abuse Sister     COPD Brother     Hypertension Brother     Arthritis Brother     Lung cancer Brother     Kidney disease Brother     Arthritis Maternal Grandmother     Osteoporosis Maternal  Grandmother     Stroke Paternal Grandfather      Social History     Tobacco Use    Smoking status: Former     Packs/day: 0.50     Years: 40.00     Additional pack years: 0.00     Total pack years: 20.00     Types: Cigarettes     Start date:      Quit date: 10/11/2012     Years since quittin.2     Passive exposure: Never    Smokeless tobacco: Never    Tobacco comments:     Vapes 3mL-8   Vaping Use    Vaping Use: Every day    Substances: Flavoring, menthol 12 mg    Devices: [employer]   Substance Use Topics    Alcohol use: No    Drug use: No       Current Outpatient Medications:     albuterol sulfate HFA (Ventolin HFA) 108 (90 Base) MCG/ACT inhaler, Inhale 2 puffs Every 6 (Six) Hours As Needed for Wheezing or Shortness of Air., Disp: 18 g, Rfl: 0    ALLEGRA-D ALLERGY & CONGESTION  MG per 12 hr tablet, TAKE ONE TABLET BY MOUTH TWICE A DAY AS NEEDED FOR ALLERGIES (Patient taking differently: Take 1 tablet by mouth Daily.), Disp: 60 tablet, Rfl: 0    Breztri Aerosphere 160-9-4.8 MCG/ACT aerosol inhaler, Inhale 2 puffs 2 (Two) Times a Day., Disp: 32.1 g, Rfl: 3    [employer] IBUPROFEN PO, PRN, Disp: , Rfl:     denosumab (Prolia) 60 MG/ML solution prefilled syringe syringe, Inject 1 mL under the skin into the appropriate area as directed Every 6 (Six) Months., Disp: 180 mL, Rfl: 1    HYDROcodone-acetaminophen (Norco) 7.5-325 MG per tablet, Take 1 tablet by mouth Every 6 (Six) Hours As Needed for Severe Pain., Disp: 120 tablet, Rfl: 0    hydrocortisone 2.5 % cream, Apply 1 application  topically to the appropriate area as directed 2 (Two) Times a Day As Needed for Irritation., Disp: 28 g, Rfl: 0    ipratropium-albuterol (DUO-NEB) 0.5-2.5 mg/3 ml nebulizer, Take 3 mL by nebulization 4 (Four) Times a Day As Needed for Wheezing or Shortness of Air., Disp: 360 mL, Rfl: 1    levothyroxine (SYNTHROID, LEVOTHROID) 100 MCG tablet, Take 1 tablet by mouth Daily., Disp: 90 tablet, Rfl: 2    metoprolol succinate XL  (TOPROL-XL) 50 MG 24 hr tablet, Take 1 tablet by mouth Daily., Disp: 30 tablet, Rfl: 5    multivitamin with minerals tablet tablet, Take 1 tablet by mouth Daily., Disp: , Rfl:     omeprazole (priLOSEC) 20 MG capsule, Take 1 capsule by mouth Daily., Disp: 90 capsule, Rfl: 1    simvastatin (Zocor) 40 MG tablet, Take 1 tablet by mouth Every Night., Disp: 90 tablet, Rfl: 3    vitamin D3 125 MCG (5000 UT) capsule capsule, Take 1 capsule by mouth Every Other Day. Take 1 tablet on Monday,Wednesday and Friday., Disp: 30 capsule, Rfl: 0  Allergies:  Erythromycin, Celebrex [celecoxib], and Percocet [oxycodone-acetaminophen]      Physical Exam  VITALS REVIEWED    General:      well developed, in no acute distress.    Head:      normocephalic and atraumatic.    Eyes:      PERRL/EOM intact, conjunctiva and sclera clear with out nystagmus.    Neck:      no masses, thyromegaly,  trachea central with normal respiratory effort and PMI displaced laterally  Lungs:      Clear to auscultation bilaterally  Heart:       Regular rate and rhythm  Msk:      no deformity or scoliosis noted of thoracic or lumbar spine.    Pulses:      pulses normal in all 4 extremities.    Extremities:       No lower extremity edema  Neurologic:      no focal deficits.   alert oriented x3  Skin:      intact without lesions or rashes.    Psych:      alert and cooperative; normal mood and affect; normal attention span and concentration.      Result Review :               Pertinent cardiac workup    EKG 10/11/2023 sinus rhythm, Q waves in inferior leads.  Stress test 8/21/2023 showed no ischemic changes, did show fixed defect in the inferior inferoseptal wall.  Infarction versus attenuation artifact.  Echo 8/17/2023 ejection fraction 50 to 55%.      Procedures        Assessment and Plan      Eva Borrego is a 73-year-old female patient who is here today for follow-up, she was initially seen for tachycardia which was sinus tachycardia.  She was placed on  metoprolol and she is feeling better.  No further recommendations at this time.  Will see her on as-needed basis.    Diagnoses and all orders for this visit:    1. Sinus tachycardia (Primary)           Return if symptoms worsen or fail to improve.  Patient was given instructions and counseling regarding her condition or for health maintenance advice. Please see specific information pulled into the AVS if appropriate.

## 2024-01-25 DIAGNOSIS — M53.3 SACROILIAC JOINT PAIN: ICD-10-CM

## 2024-01-25 RX ORDER — HYDROCODONE BITARTRATE AND ACETAMINOPHEN 7.5; 325 MG/1; MG/1
1 TABLET ORAL EVERY 6 HOURS PRN
Qty: 120 TABLET | Refills: 0 | Status: SHIPPED | OUTPATIENT
Start: 2024-01-25

## 2024-01-25 NOTE — TELEPHONE ENCOUNTER
Caller: Eva Borrego    Relationship: Self    Best call back number: [phone]     Requested Prescriptions:   Requested Prescriptions     Pending Prescriptions Disp Refills    HYDROcodone-acetaminophen (Norco) 7.5-325 MG per tablet 120 tablet 0     Sig: Take 1 tablet by mouth Every 6 (Six) Hours As Needed for Severe Pain.        Pharmacy where request should be sent: [facility] [id number] [place] [address] AT [facility] [address] [place] [address] - [phone] [facility] [phone]      Last office visit with prescribing clinician: 7/18/2023   Last telemedicine visit with prescribing clinician: Visit date not found   Next office visit with prescribing clinician: Visit date not found     Additional details provided by patient: PATIENT HAS TWO DAYS LEFT OF MEDICATION    Does the patient have less than a 3 day supply:  [x] Yes  [] No    Would you like a call back once the refill request has been completed: [] Yes [x] No    If the office needs to give you a call back, can they leave a voicemail: [] Yes [x] No    Bryce Myles Rep   01/25/24 10:01 EST

## 2024-02-19 ENCOUNTER — OFFICE VISIT (OUTPATIENT)
Dept: FAMILY MEDICINE CLINIC | Facility: CLINIC | Age: 74
End: 2024-02-19
Payer: MEDICARE

## 2024-02-19 VITALS
OXYGEN SATURATION: 99 % | RESPIRATION RATE: 18 BRPM | TEMPERATURE: 98 F | WEIGHT: 121 LBS | SYSTOLIC BLOOD PRESSURE: 118 MMHG | BODY MASS INDEX: 21.44 KG/M2 | DIASTOLIC BLOOD PRESSURE: 69 MMHG | HEART RATE: 64 BPM | HEIGHT: 63 IN

## 2024-02-19 DIAGNOSIS — Z12.31 ENCOUNTER FOR SCREENING MAMMOGRAM FOR BREAST CANCER: ICD-10-CM

## 2024-02-19 DIAGNOSIS — Z23 NEED FOR INFLUENZA VACCINATION: ICD-10-CM

## 2024-02-19 DIAGNOSIS — M81.0 OSTEOPOROSIS WITHOUT CURRENT PATHOLOGICAL FRACTURE, UNSPECIFIED OSTEOPOROSIS TYPE: ICD-10-CM

## 2024-02-19 DIAGNOSIS — M53.3 SACROILIAC JOINT PAIN: ICD-10-CM

## 2024-02-19 DIAGNOSIS — H91.93 BILATERAL HEARING LOSS, UNSPECIFIED HEARING LOSS TYPE: Primary | ICD-10-CM

## 2024-02-19 DIAGNOSIS — J43.1 PANLOBULAR EMPHYSEMA: ICD-10-CM

## 2024-02-19 DIAGNOSIS — J30.89 ENVIRONMENTAL AND SEASONAL ALLERGIES: ICD-10-CM

## 2024-02-19 DIAGNOSIS — E78.2 MIXED HYPERLIPIDEMIA: ICD-10-CM

## 2024-02-19 DIAGNOSIS — E03.9 ACQUIRED HYPOTHYROIDISM: ICD-10-CM

## 2024-02-19 RX ORDER — ALBUTEROL SULFATE 90 UG/1
2 AEROSOL, METERED RESPIRATORY (INHALATION) EVERY 6 HOURS PRN
Qty: 18 G | Refills: 0 | Status: SHIPPED | OUTPATIENT
Start: 2024-02-19

## 2024-02-19 RX ORDER — METOPROLOL SUCCINATE 50 MG/1
50 TABLET, EXTENDED RELEASE ORAL DAILY
Qty: 90 TABLET | Refills: 1 | Status: SHIPPED | OUTPATIENT
Start: 2024-02-19

## 2024-02-19 NOTE — PROGRESS NOTES
Answers submitted by the patient for this visit:  Primary Reason for Visit (Submitted on 2/17/2024)  What is the primary reason for your visit?: Ear Pain  Ear Pain Questionnaire (Submitted on 2/17/2024)  Chief Complaint: Ear pain  Affected ear: right  Chronicity: recurrent  Onset: 1 to 4 weeks ago  Progression since onset: coming and going  Frequency: intermittently  Fever: no fever  Pain - numeric: 4/10  cough: No  drainage: No  headaches: No  hearing loss: Yes  hoarse voice: Yes  neck pain: No  rash: No  rhinorrhea: No  sore throat: No  congestion: No  jaw pain: No  adenopathy: No  tinnitus: Yes  Treatments tried : nothing  Subjective   Eva Borrego is a 73 y.o. female.     Chief Complaint   Patient presents with    Earache    Injections     Patient was given a high dose flu shot while in the office today.    COPD    Hyperlipidemia    Hypothyroidism    Pain         Current Outpatient Medications:     albuterol sulfate HFA (Ventolin HFA) 108 (90 Base) MCG/ACT inhaler, Inhale 2 puffs Every 6 (Six) Hours As Needed for Wheezing or Shortness of Air., Disp: 18 g, Rfl: 0    ALLEGRA-D ALLERGY & CONGESTION  MG per 12 hr tablet, TAKE ONE TABLET BY MOUTH TWICE A DAY AS NEEDED FOR ALLERGIES (Patient taking differently: Take 1 tablet by mouth Every Morning.), Disp: 60 tablet, Rfl: 0    Breztri Aerosphere 160-9-4.8 MCG/ACT aerosol inhaler, Inhale 2 puffs 2 (Two) Times a Day., Disp: 32.1 g, Rfl: 3    [employer]S IBUPROFEN PO, PRN, Disp: , Rfl:     denosumab (Prolia) 60 MG/ML solution prefilled syringe syringe, Inject 1 mL under the skin into the appropriate area as directed Every 6 (Six) Months., Disp: 180 mL, Rfl: 1    Docusate Sodium (COLACE PO), 1 po qd prn, Disp: , Rfl:     hydrocortisone 2.5 % cream, Apply 1 application  topically to the appropriate area as directed 2 (Two) Times a Day As Needed for Irritation., Disp: 28 g, Rfl: 0    ipratropium-albuterol (DUO-NEB) 0.5-2.5 mg/3 ml nebulizer, Take 3 mL by nebulization 4  (Four) Times a Day As Needed for Wheezing or Shortness of Air., Disp: 360 mL, Rfl: 1    levothyroxine (SYNTHROID, LEVOTHROID) 100 MCG tablet, Take 1 tablet by mouth Daily., Disp: 90 tablet, Rfl: 2    metoprolol succinate XL (TOPROL-XL) 50 MG 24 hr tablet, Take 1 tablet by mouth Daily., Disp: 90 tablet, Rfl: 1    multivitamin with minerals tablet tablet, Take 1 tablet by mouth Daily., Disp: , Rfl:     omeprazole (priLOSEC) 20 MG capsule, Take 1 capsule by mouth Daily., Disp: 90 capsule, Rfl: 1    simvastatin (Zocor) 40 MG tablet, Take 1 tablet by mouth Every Night., Disp: 90 tablet, Rfl: 3    vitamin D3 125 MCG (5000 UT) capsule capsule, Take 1 capsule by mouth Every Other Day. Take 1 tablet on Monday,Wednesday and Friday. (Patient taking differently: Take 1 capsule by mouth Every Other Day.), Disp: 30 capsule, Rfl: 0    HYDROcodone-acetaminophen (Norco) 7.5-325 MG per tablet, Take 1 tablet by mouth Every 6 (Six) Hours As Needed for Severe Pain., Disp: 120 tablet, Rfl: 0    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    CHOL     COPD     DEXA     2017 =( -0.1/ -3.0); 2019 =(0.7/ -2.9); 2020 = (0.9/ -2.7); 2023= (0.5/ -2.5)    GERD     Hypothyroidism     MAMMO     NEG = 2018/ 2019/ 2020/ 2022/ 2023    Osteoarthritis     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    Post-menopausal     Sacroiliac joint pain     Seasonal allergies        Past Surgical History:   Procedure Laterality Date    CHOLECYSTECTOMY      COLONOSCOPY      TA = 2018/ 2021, rech 2024             GSI    EYE SURGERY      B/L Cataract/ Lens Lt    JOINT REPLACEMENT Right     THR---2002/ 2017    MASS EXCISION Left 10/11/2022    Procedure: EXCISION MASS UPPER EXTREMITY, ELBOW;  Surgeon: Jeremy Trinh MD;  Location: [facility] MAIN OR;  Service: Orthopedics;  Laterality: Left;    SPINE SURGERY       Discectomy Lumbar    TONSILLECTOMY         Family History   Problem Relation Age of Onset    Diabetes Mother     Hyperlipidemia Father     Aortic aneurysm Father     Alcohol abuse Sister     COPD Brother     Hypertension Brother     Arthritis Brother     Lung cancer Brother     Kidney disease Brother     Arthritis Maternal Grandmother     Osteoporosis Maternal Grandmother     Stroke Paternal Grandfather        Social History     Socioeconomic History    Marital status:    Tobacco Use    Smoking status: Former     Current packs/day: 0.00     Average packs/day: 0.5 packs/day for 43.8 years (21.9 ttl pk-yrs)     Types: Cigarettes     Start date:      Quit date: 10/11/2012     Years since quittin.4     Passive exposure: Never    Smokeless tobacco: Never    Tobacco comments:     Vapes 3mL-8   Vaping Use    Vaping status: Every Day    Substances: Flavoring, menthol 12 mg    Devices: Refillable tank   Substance and Sexual Activity    Alcohol use: No    Drug use: No    Sexual activity: Defer       Earache   There is pain in the right ear. This is a recurrent problem. The current episode started 1 to 4 weeks ago. The problem occurs intermittently. The problem has been coming and going. There has been no fever. The pain is at a severity of 4/10. Associated symptoms include hearing loss. Pertinent negatives include no coughing, drainage, headaches, neck pain, rash, rhinorrhea or sore throat. She has tried nothing for the symptoms.   The patient is a 73-year-old female who presents for 6mos f/u on CHOL/ Hypothyroid/ chronic pain/ COPD and now w/ Rt ear pain    She has had intermittent otalgia without evaluation. It sounds like everybody is under water. She has trouble hearing in both ears, but the right ear is worse than the left. She has always had sinus problems. She has environmental allergies. She takes Allegra-D 12-hour 1 in the morning.    She gets short of breath. She does not want to do pulmonary  rehabilitation.     She sees Dr. Whitaker, cardiologist. She sees Dr. Trinh, orthopedist. She sees an eye clinician. She does not have a dermatologist.     She gets her mammogram and bone density scan at the same time. Her last colonoscopy was in 10/2021.    She quit smoking in . She is vaping some and has decreased the level of nicotine. She does not drink alcohol or use drugs. She lives with her son who does smoke in the house.    Her younger brother is still living. Her older brother passed away in 2023 at the age of 74-years-old. Her other brother  of renal failure and pneumonia.    She has received 2 shingles vaccines. She has received 3 COVID-19 vaccines.    The following portions of the patient's history were reviewed and updated as appropriate: allergies, current medications, past family history, past medical history, past social history, past surgical history and problem list.    Review of Systems   HENT:  Positive for ear pain, hearing loss and tinnitus. Negative for congestion, rhinorrhea and sore throat.    Respiratory:  Negative for cough.    Musculoskeletal:  Negative for neck pain.   Skin:  Negative for rash.   Hematological:  Negative for adenopathy.       Vitals:    24 0916   BP: 118/69   Pulse: 64   Resp: 18   Temp: 98 °F (36.7 °C)   SpO2: 99%       Objective   Physical Exam  Vitals and nursing note reviewed.   Constitutional:       General: She is not in acute distress.     Appearance: She is well-developed. She is not ill-appearing or toxic-appearing.   HENT:      Head: Normocephalic and atraumatic.      Right Ear: Tympanic membrane, ear canal and external ear normal. There is no impacted cerumen.      Left Ear: Tympanic membrane, ear canal and external ear normal. There is no impacted cerumen.   Cardiovascular:      Rate and Rhythm: Normal rate and regular rhythm.      Heart sounds: Normal heart sounds. No murmur heard.  Pulmonary:      Effort: Pulmonary effort is normal.       Breath sounds: Examination of the right-upper field reveals decreased breath sounds. Examination of the left-upper field reveals decreased breath sounds. Examination of the right-middle field reveals decreased breath sounds. Examination of the left-middle field reveals decreased breath sounds. Examination of the right-lower field reveals decreased breath sounds. Examination of the left-lower field reveals decreased breath sounds. Decreased breath sounds present. No wheezing, rhonchi or rales.   Musculoskeletal:      Right lower leg: Edema present.      Left lower leg: Edema present.   Skin:     General: Skin is warm and dry.      Findings: No rash.   Neurological:      Mental Status: She is alert and oriented to person, place, and time.      Cranial Nerves: No cranial nerve deficit.   Psychiatric:         Attention and Perception: Attention and perception normal.         Mood and Affect: Mood and affect normal.         Speech: Speech normal.         Behavior: Behavior normal. Behavior is cooperative.         Thought Content: Thought content normal.         Cognition and Memory: Cognition and memory normal.         Judgment: Judgment normal.         BMI is within normal parameters. No other follow-up for BMI required.      Assessment & Plan   Diagnoses and all orders for this visit:    1. Bilateral hearing loss, unspecified hearing loss type (Primary)  -     Ambulatory Referral to ENT (Otolaryngology)    2. Acquired hypothyroidism  -     TSH  -     T4, Free    3. Environmental and seasonal allergies  -     Ambulatory Referral to ENT (Otolaryngology)    4. Sacroiliac joint pain    5. Panlobular emphysema    6. Mixed hyperlipidemia  -     Lipid Panel  -     Comprehensive Metabolic Panel    7. Osteoporosis without current pathological fracture, unspecified osteoporosis type  -     Vitamin D,25-Hydroxy    8. Encounter for screening mammogram for breast cancer  -     Mammo Screening Digital Tomosynthesis Bilateral With  CAD; Future    9. Need for influenza vaccination  -     Fluzone High-Dose 65+yrs    Other orders  -     albuterol sulfate HFA (Ventolin HFA) 108 (90 Base) MCG/ACT inhaler; Inhale 2 puffs Every 6 (Six) Hours As Needed for Wheezing or Shortness of Air.  Dispense: 18 g; Refill: 0  -     metoprolol succinate XL (TOPROL-XL) 50 MG 24 hr tablet; Take 1 tablet by mouth Daily.  Dispense: 90 tablet; Refill: 1      Hearing loss.  This could be due to allergies.   I will refer her to ENT.   We may refer her to an audiologist for a hearing test.    COPD.  She is declining pulmonary rehabilitation.   She will continue Breztri.   I will refill her rescue inhaler.      Her blood pressure today is stable.   She will continue metoprolol.    Health maintenance.  She will receive her influenza vaccine today.  Discussed RSV vaccine and COVID-19 booster.    Medication check.  She will continue Prolia twice a year.   She will continue Norco 7.5 mg.    GERD.  She will continue omeprazole.    Follow-up  The patient will follow up in 6 months with laboratory studies in 08/2024.    Transcribed from ambient dictation for Shannan Larsen DO by Nicole Schilling.  02/19/24   10:50 EST    Patient or patient representative verbalized consent to the visit recording.  I have personally performed the services described in this document as transcribed by the above individual, and it is both accurate and complete.

## 2024-03-06 DIAGNOSIS — M53.3 SACROILIAC JOINT PAIN: ICD-10-CM

## 2024-03-06 RX ORDER — HYDROCODONE BITARTRATE AND ACETAMINOPHEN 7.5; 325 MG/1; MG/1
1 TABLET ORAL EVERY 6 HOURS PRN
Qty: 120 TABLET | Refills: 0 | Status: SHIPPED | OUTPATIENT
Start: 2024-03-06

## 2024-03-06 NOTE — TELEPHONE ENCOUNTER
Caller: Eva Borrego    Relationship: Self    Best call back number: [phone] (Home)     Requested Prescriptions:   Requested Prescriptions     Pending Prescriptions Disp Refills    HYDROcodone-acetaminophen (Norco) 7.5-325 MG per tablet 120 tablet 0     Sig: Take 1 tablet by mouth Every 6 (Six) Hours As Needed for Severe Pain.        Pharmacy where request should be sent: [facility] [id number] [place] [address] AT [facility] [address] [place] [address] - [phone] [facility] [phone]      Last office visit with prescribing clinician: 2/19/2024   Last telemedicine visit with prescribing clinician: Visit date not found   Next office visit with prescribing clinician: 8/19/2024     Additional details provided by patient:     Does the patient have less than a 3 day supply:  [x] Yes  [] No    Would you like a call back once the refill request has been completed: [] Yes [] No    If the office needs to give you a call back, can they leave a voicemail: [] Yes [] No    Bryce Hollins Rep   03/06/24 09:50 EST

## 2024-03-10 PROBLEM — J02.9 ACUTE PHARYNGITIS: Status: RESOLVED | Noted: 2023-02-08 | Resolved: 2024-03-10

## 2024-03-11 RX ORDER — OMEPRAZOLE 20 MG/1
20 CAPSULE, DELAYED RELEASE ORAL DAILY
Qty: 90 CAPSULE | Refills: 0 | Status: SHIPPED | OUTPATIENT
Start: 2024-03-11

## 2024-03-11 NOTE — TELEPHONE ENCOUNTER
Rx Refill Note  Requested Prescriptions     Pending Prescriptions Disp Refills    omeprazole (priLOSEC) 20 MG capsule [Pharmacy Med Name: OMEPRAZOLE DR 20 MG CAPSULE] 90 capsule 1     Sig: TAKE 1 CAPSULE BY MOUTH DAILY      Last office visit with prescribing clinician: 2/19/2024   Last telemedicine visit with prescribing clinician: Visit date not found   Next office visit with prescribing clinician: 8/19/2024        Lipid Panel (08/21/2023 13:47)                  Would you like a call back once the refill request has been completed: [] Yes [] No    If the office needs to give you a call back, can they leave a voicemail: [] Yes [] No    Charisma Villegas, RT  03/11/24, 11:52 EDT

## 2024-04-15 ENCOUNTER — TELEPHONE (OUTPATIENT)
Dept: FAMILY MEDICINE CLINIC | Facility: CLINIC | Age: 74
End: 2024-04-15

## 2024-04-15 DIAGNOSIS — M53.3 SACROILIAC JOINT PAIN: ICD-10-CM

## 2024-04-15 RX ORDER — HYDROCODONE BITARTRATE AND ACETAMINOPHEN 7.5; 325 MG/1; MG/1
1 TABLET ORAL EVERY 6 HOURS PRN
Qty: 120 TABLET | Refills: 0 | Status: CANCELLED | OUTPATIENT
Start: 2024-04-15

## 2024-04-15 RX ORDER — HYDROCODONE BITARTRATE AND ACETAMINOPHEN 7.5; 325 MG/1; MG/1
1 TABLET ORAL EVERY 6 HOURS PRN
Qty: 120 TABLET | Refills: 0 | Status: SHIPPED | OUTPATIENT
Start: 2024-04-15

## 2024-04-15 RX ORDER — LEVOTHYROXINE SODIUM 0.1 MG/1
100 TABLET ORAL DAILY
Qty: 90 TABLET | Refills: 2 | Status: CANCELLED | OUTPATIENT
Start: 2024-04-15

## 2024-04-15 NOTE — TELEPHONE ENCOUNTER
Caller: Eva Borrego    Relationship: Self    Best call back number: [phone]    Requested Prescriptions:   Requested Prescriptions     Pending Prescriptions Disp Refills    HYDROcodone-acetaminophen (Norco) 7.5-325 MG per tablet 120 tablet 0     Sig: Take 1 tablet by mouth Every 6 (Six) Hours As Needed for Severe Pain.    levothyroxine (SYNTHROID, LEVOTHROID) 100 MCG tablet 90 tablet 2     Sig: Take 1 tablet by mouth Daily.        Pharmacy where request should be sent: [facility] [id number] [place] [address] - [phone] [facility] [phone]      Last office visit with prescribing clinician: 2/19/2024   Last telemedicine visit with prescribing clinician: Visit date not found   Next office visit with prescribing clinician: 8/19/2024     Additional details provided by patient: PATIENT HAS ONLY TODAY LEFT OF THE PAIN MEDICATION.    Does the patient have less than a 3 day supply:  [x] Yes  [] No    Would you like a call back once the refill request has been completed: [] Yes [x] No    If the office needs to give you a call back, can they leave a voicemail: [] Yes [x] No    Bryce Randhawa Rep   04/15/24 09:06 EDT

## 2024-04-17 ENCOUNTER — TELEPHONE (OUTPATIENT)
Dept: FAMILY MEDICINE CLINIC | Facility: CLINIC | Age: 74
End: 2024-04-17

## 2024-04-17 NOTE — TELEPHONE ENCOUNTER
Name: PAPA ARAUZ    Relationship: Emergency Contact    Best Callback Number: [phone]    HUB PROVIDED THE RELAY MESSAGE FROM THE OFFICE   PATIENT VOICED UNDERSTANDING AND HAS NO FURTHER QUESTIONS AT THIS TIME    ADDITIONAL INFORMATION:        PATIENT SCHEDULED FOR 4/18/24 AT 1:15 FOR DR STERLING.

## 2024-04-17 NOTE — TELEPHONE ENCOUNTER
RELAY.    LEFT MESSAGE WITH PAPA THAT DR STERLING WANTS PATIENT TO BE SEEN EITHER HERE IF WE HAVE AVAILABILITY OR AT AN URGENT CARE CENTER.

## 2024-04-17 NOTE — TELEPHONE ENCOUNTER
Caller: [email or web address]    Relationship: Emergency Contact    Best call back number: [phone]    What medication are you requesting: RED LEFT EYE    What are your current symptoms: RED, STICKY AND SWOLLEN EYE     How long have you been experiencing symptoms: 1-2 DAYS     Have you had these symptoms before:    [x] Yes  [] No    Have you been treated for these symptoms before:   [x] Yes  [] No    If a prescription is needed, what is your preferred pharmacy and phone number: [id number] - [place] [address] [address] [facility] [address] - [phone]  - [phone]      Additional notes:    PATIENT'S SON CALLED AND SAID THAT HIS MOM HAS A CASE OF PINK EYE AND HE IS WANTING TO SEE IF SHE CAN HAVE SOMETHING SENT IN FOR IT

## 2024-04-18 ENCOUNTER — OFFICE VISIT (OUTPATIENT)
Dept: FAMILY MEDICINE CLINIC | Facility: CLINIC | Age: 74
End: 2024-04-18
Payer: MEDICARE

## 2024-04-18 VITALS
OXYGEN SATURATION: 95 % | RESPIRATION RATE: 16 BRPM | HEART RATE: 87 BPM | WEIGHT: 123 LBS | BODY MASS INDEX: 21.79 KG/M2 | HEIGHT: 63 IN | TEMPERATURE: 98.4 F | DIASTOLIC BLOOD PRESSURE: 71 MMHG | SYSTOLIC BLOOD PRESSURE: 107 MMHG

## 2024-04-18 DIAGNOSIS — H10.32 ACUTE BACTERIAL CONJUNCTIVITIS OF LEFT EYE: Primary | ICD-10-CM

## 2024-04-18 DIAGNOSIS — E03.9 ACQUIRED HYPOTHYROIDISM: ICD-10-CM

## 2024-04-18 PROCEDURE — 99213 OFFICE O/P EST LOW 20 MIN: CPT | Performed by: FAMILY MEDICINE

## 2024-04-18 RX ORDER — IPRATROPIUM BROMIDE AND ALBUTEROL SULFATE 2.5; .5 MG/3ML; MG/3ML
3 SOLUTION RESPIRATORY (INHALATION) 4 TIMES DAILY PRN
Qty: 360 ML | Refills: 1 | Status: SHIPPED | OUTPATIENT
Start: 2024-04-18

## 2024-04-18 RX ORDER — POLYMYXIN B SULFATE AND TRIMETHOPRIM 1; 10000 MG/ML; [USP'U]/ML
1 SOLUTION OPHTHALMIC EVERY 6 HOURS
Qty: 10 ML | Refills: 0 | Status: SHIPPED | OUTPATIENT
Start: 2024-04-18

## 2024-04-18 RX ORDER — OMEPRAZOLE 20 MG/1
20 CAPSULE, DELAYED RELEASE ORAL DAILY
Qty: 90 CAPSULE | Refills: 1 | Status: SHIPPED | OUTPATIENT
Start: 2024-04-18

## 2024-04-18 RX ORDER — LEVOTHYROXINE SODIUM 0.1 MG/1
100 TABLET ORAL DAILY
Qty: 90 TABLET | Refills: 1 | Status: SHIPPED | OUTPATIENT
Start: 2024-04-18

## 2024-04-18 NOTE — PROGRESS NOTES
Subjective   Eva Borrego is a 73 y.o. female.     Chief Complaint   Patient presents with    Eye Problem     Left eye redness. Possible conjunctivitis.         Current Outpatient Medications:     albuterol sulfate HFA (Ventolin HFA) 108 (90 Base) MCG/ACT inhaler, Inhale 2 puffs Every 6 (Six) Hours As Needed for Wheezing or Shortness of Air., Disp: 18 g, Rfl: 0    Breztri Aerosphere 160-9-4.8 MCG/ACT aerosol inhaler, Inhale 2 puffs 2 (Two) Times a Day., Disp: 32.1 g, Rfl: 3    [employer] IBUPROFEN PO, PRN, Disp: , Rfl:     denosumab (Prolia) 60 MG/ML solution prefilled syringe syringe, Inject 1 mL under the skin into the appropriate area as directed Every 6 (Six) Months., Disp: 180 mL, Rfl: 1    HYDROcodone-acetaminophen (Norco) 7.5-325 MG per tablet, Take 1 tablet by mouth Every 6 (Six) Hours As Needed for Severe Pain., Disp: 120 tablet, Rfl: 0    ipratropium-albuterol (DUO-NEB) 0.5-2.5 mg/3 ml nebulizer, Take 3 mL by nebulization 4 (Four) Times a Day As Needed for Wheezing or Shortness of Air., Disp: 360 mL, Rfl: 1    levothyroxine (SYNTHROID, LEVOTHROID) 100 MCG tablet, Take 1 tablet by mouth Daily., Disp: 90 tablet, Rfl: 1    metoprolol succinate XL (TOPROL-XL) 50 MG 24 hr tablet, Take 1 tablet by mouth Daily., Disp: 90 tablet, Rfl: 1    multivitamin with minerals tablet tablet, Take 1 tablet by mouth Daily., Disp: , Rfl:     omeprazole (priLOSEC) 20 MG capsule, Take 1 capsule by mouth Daily., Disp: 90 capsule, Rfl: 1    simvastatin (Zocor) 40 MG tablet, Take 1 tablet by mouth Every Night., Disp: 90 tablet, Rfl: 3    vitamin D3 125 MCG (5000 UT) capsule capsule, Take 1 capsule by mouth Every Other Day. Take 1 tablet on Monday,Wednesday and Friday. (Patient taking differently: Take 1 capsule by mouth Every Other Day.), Disp: 30 capsule, Rfl: 0    ALLEGRA-D ALLERGY & CONGESTION  MG per 12 hr tablet, TAKE ONE TABLET BY MOUTH TWICE A DAY AS NEEDED FOR ALLERGIES (Patient not taking: Reported on 4/18/2024),  Disp: 60 tablet, Rfl: 0    Docusate Sodium (COLACE PO), 1 po qd prn (Patient not taking: Reported on 4/18/2024), Disp: , Rfl:     hydrocortisone 2.5 % cream, Apply 1 application  topically to the appropriate area as directed 2 (Two) Times a Day As Needed for Irritation. (Patient not taking: Reported on 4/18/2024), Disp: 28 g, Rfl: 0    trimethoprim-polymyxin b (Polytrim) 43744-4.1 UNIT/ML-% ophthalmic solution, Administer 1 drop into the left eye Every 6 (Six) Hours. 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    Post-menopausal     Sacroiliac joint pain     Seasonal allergies        Past Surgical History:   Procedure Laterality Date    CHOLECYSTECTOMY      COLONOSCOPY      TA = 2018/ 2021, rech 2024             GSI    EYE SURGERY      B/L Cataract/ Lens Lt    JOINT REPLACEMENT Right     THR---2002/ 2017    MASS EXCISION Left 10/11/2022    Procedure: EXCISION MASS UPPER EXTREMITY, ELBOW;  Surgeon: Jeremy Trinh MD;  Location: [facility] MAIN OR;  Service: Orthopedics;  Laterality: Left;    SPINE SURGERY      Discectomy Lumbar    TONSILLECTOMY         Family History   Problem Relation Age of Onset    Diabetes Mother     Hyperlipidemia Father     Aortic aneurysm Father     Alcohol abuse Sister     COPD Brother     Hypertension Brother     Arthritis Brother     Lung cancer Brother     Kidney disease Brother     Arthritis Maternal Grandmother     Osteoporosis Maternal Grandmother     Stroke Paternal Grandfather        Social History     Socioeconomic History    Marital status:   "  Tobacco Use    Smoking status: Former     Current packs/day: 0.00     Average packs/day: 0.5 packs/day for 43.8 years (21.9 ttl pk-yrs)     Types: Cigarettes     Start date:      Quit date: 10/11/2012     Years since quittin.5     Passive exposure: Never    Smokeless tobacco: Never    Tobacco comments:     Vapes 3mL-8   Vaping Use    Vaping status: Every Day    Substances: Flavoring, menthol 12 mg    Devices: Refillable tank   Substance and Sexual Activity    Alcohol use: No    Drug use: No    Sexual activity: Defer       History of Present Illness  The patient is here with complaints of left eye redness and discharge for 2 days.    The patient denies the presence of any fever. She reports experiencing itchiness and morning crusting of the eye, necessitating the use of tissues for relief. During the day, she observes a white discharge. She denies any blurred or double vision, but describes the sensation as \"smearing.\" She is uncertain about the presence of a foreign body or dust in her eye. The onset of her symptoms was yesterday morning, with the eye becoming crusted shut for the second morning. The eye is mildly swollen. She attempted to alleviate her symptoms with a cold compress, but found it to be excessively hot. Upon the onset of her symptoms, she noticed puffiness around the eye. She has recently changed her allergy medications. She cleans her eye with a washcloth in the morning.    Supplemental Information  She has an appointment with Dr. BENTLEY---ENT at the end of the month. She takes vitamin D 5000 every other day.        The following portions of the patient's history were reviewed and updated as appropriate: allergies, current medications, past family history, past medical history, past social history, past surgical history and problem list.    Review of Systems   Constitutional:  Negative for activity change, appetite change, fever, unexpected weight gain and unexpected weight loss.   Eyes:  " Positive for discharge, redness and itching. Negative for blurred vision, double vision, photophobia and visual disturbance.       Vitals:    04/18/24 1311   BP: 107/71   Pulse: 87   Resp: 16   Temp: 98.4 °F (36.9 °C)   SpO2: 95%       Objective   Physical Exam  Eyes:      General:         Right eye: No discharge or hordeolum.         Left eye: Discharge present.No hordeolum.      Extraocular Movements:      Right eye: Abnormal extraocular motion present.      Left eye: Abnormal extraocular motion present.      Conjunctiva/sclera:      Right eye: Right conjunctiva is not injected. No chemosis.     Left eye: Left conjunctiva is injected. No chemosis.        Physical Exam  Swelling is more pronounced in the upper eyelid than the lower eyelid.     BMI is within normal parameters. No other follow-up for BMI required.      Assessment & Plan   Diagnoses and all orders for this visit:    1. Acute bacterial conjunctivitis of left eye (Primary)    2. Acquired hypothyroidism    Other orders  -     ipratropium-albuterol (DUO-NEB) 0.5-2.5 mg/3 ml nebulizer; Take 3 mL by nebulization 4 (Four) Times a Day As Needed for Wheezing or Shortness of Air.  Dispense: 360 mL; Refill: 1  -     trimethoprim-polymyxin b (Polytrim) 37878-6.1 UNIT/ML-% ophthalmic solution; Administer 1 drop into the left eye Every 6 (Six) Hours. Treat for 7days  Dispense: 10 mL; Refill: 0  -     levothyroxine (SYNTHROID, LEVOTHROID) 100 MCG tablet; Take 1 tablet by mouth Daily.  Dispense: 90 tablet; Refill: 1  -     omeprazole (priLOSEC) 20 MG capsule; Take 1 capsule by mouth Daily.  Dispense: 90 capsule; Refill: 1      Assessment & Plan  1. Acute conjunctivitis in the left eye.  The patient is advised to apply a cold compress to the affected eye. Polytrim drops have been prescribed, to be administered four times daily for the left eye for a duration of one week. Should the patient experience symptoms in the right eye, a full treatment regimen will be  initiated. In the event that the left eye begins to exhibit crusting and itchiness, the patient is to commence treatment.     Results            Patient or patient representative verbalized consent for the use of Ambient Listening during the visit with  Shannan Larsen DO for chart documentation. 4/28/2024  07:37 EDT

## 2024-05-13 ENCOUNTER — HOSPITAL ENCOUNTER (OUTPATIENT)
Dept: MAMMOGRAPHY | Facility: HOSPITAL | Age: 74
Discharge: HOME | End: 2024-05-13
Admitting: FAMILY MEDICINE
Payer: MEDICARE

## 2024-05-13 DIAGNOSIS — Z12.31 ENCOUNTER FOR SCREENING MAMMOGRAM FOR BREAST CANCER: ICD-10-CM

## 2024-05-13 PROCEDURE — 77067 SCR MAMMO BI INCL CAD: CPT

## 2024-05-13 PROCEDURE — 77063 BREAST TOMOSYNTHESIS BI: CPT

## 2024-05-28 DIAGNOSIS — M53.3 SACROILIAC JOINT PAIN: ICD-10-CM

## 2024-05-28 RX ORDER — HYDROCODONE BITARTRATE AND ACETAMINOPHEN 7.5; 325 MG/1; MG/1
1 TABLET ORAL EVERY 6 HOURS PRN
Qty: 120 TABLET | Refills: 0 | Status: SHIPPED | OUTPATIENT
Start: 2024-05-28

## 2024-06-07 ENCOUNTER — OFFICE VISIT (OUTPATIENT)
Dept: FAMILY MEDICINE CLINIC | Facility: CLINIC | Age: 74
End: 2024-06-07
Payer: MEDICARE

## 2024-06-07 VITALS
OXYGEN SATURATION: 97 % | SYSTOLIC BLOOD PRESSURE: 123 MMHG | BODY MASS INDEX: 21.62 KG/M2 | TEMPERATURE: 98.4 F | WEIGHT: 122 LBS | HEIGHT: 63 IN | HEART RATE: 102 BPM | DIASTOLIC BLOOD PRESSURE: 79 MMHG | RESPIRATION RATE: 14 BRPM

## 2024-06-07 DIAGNOSIS — L98.491 SKIN ULCER, LIMITED TO BREAKDOWN OF SKIN: Primary | ICD-10-CM

## 2024-06-07 DIAGNOSIS — F41.8 ANXIETY WITH DEPRESSION: ICD-10-CM

## 2024-06-07 PROCEDURE — 1159F MED LIST DOCD IN RCRD: CPT | Performed by: FAMILY MEDICINE

## 2024-06-07 PROCEDURE — 1125F AMNT PAIN NOTED PAIN PRSNT: CPT | Performed by: FAMILY MEDICINE

## 2024-06-07 PROCEDURE — 1160F RVW MEDS BY RX/DR IN RCRD: CPT | Performed by: FAMILY MEDICINE

## 2024-06-07 PROCEDURE — 99213 OFFICE O/P EST LOW 20 MIN: CPT | Performed by: FAMILY MEDICINE

## 2024-06-07 RX ORDER — SERTRALINE HYDROCHLORIDE 25 MG/1
25 TABLET, FILM COATED ORAL NIGHTLY
Qty: 30 TABLET | Refills: 1 | Status: SHIPPED | OUTPATIENT
Start: 2024-06-07

## 2024-06-07 NOTE — PROGRESS NOTES
Subjective   Eva Borrego is a 74 y.o. female.     Chief Complaint   Patient presents with    Wound Check     Sore on thumb. Patient states that she had a hard spot on her right thumb that got red/irritated and popped.          Current Outpatient Medications:     albuterol sulfate HFA (Ventolin HFA) 108 (90 Base) MCG/ACT inhaler, Inhale 2 puffs Every 6 (Six) Hours As Needed for Wheezing or Shortness of Air., Disp: 18 g, Rfl: 0    Breztri Aerosphere 160-9-4.8 MCG/ACT aerosol inhaler, Inhale 2 puffs 2 (Two) Times a Day., Disp: 32.1 g, Rfl: 3    [employer] IBUPROFEN PO, PRN, Disp: , Rfl:     denosumab (Prolia) 60 MG/ML solution prefilled syringe syringe, Inject 1 mL under the skin into the appropriate area as directed Every 6 (Six) Months., Disp: 180 mL, Rfl: 1    HYDROcodone-acetaminophen (Norco) 7.5-325 MG per tablet, Take 1 tablet by mouth Every 6 (Six) Hours As Needed for Severe Pain., Disp: 120 tablet, Rfl: 0    ipratropium-albuterol (DUO-NEB) 0.5-2.5 mg/3 ml nebulizer, Take 3 mL by nebulization 4 (Four) Times a Day As Needed for Wheezing or Shortness of Air., Disp: 360 mL, Rfl: 1    levothyroxine (SYNTHROID, LEVOTHROID) 100 MCG tablet, Take 1 tablet by mouth Daily., Disp: 90 tablet, Rfl: 1    metoprolol succinate XL (TOPROL-XL) 50 MG 24 hr tablet, Take 1 tablet by mouth Daily., Disp: 90 tablet, Rfl: 1    multivitamin with minerals tablet tablet, Take 1 tablet by mouth Daily., Disp: , Rfl:     omeprazole (priLOSEC) 20 MG capsule, Take 1 capsule by mouth Daily., Disp: 90 capsule, Rfl: 1    simvastatin (Zocor) 40 MG tablet, Take 1 tablet by mouth Every Night., Disp: 90 tablet, Rfl: 3    vitamin D3 125 MCG (5000 UT) capsule capsule, Take 1 capsule by mouth Every Other Day. Take 1 tablet on Monday,Wednesday and Friday. 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    Post-menopausal     Sacroiliac joint pain     Seasonal allergies        Past Surgical History:   Procedure Laterality Date    CHOLECYSTECTOMY      COLONOSCOPY      TA = 2021, rech              GSI    EYE SURGERY      B/L Cataract/ Lens Lt    JOINT REPLACEMENT Right     THR---2017    MASS EXCISION Left 10/11/2022    Procedure: EXCISION MASS UPPER EXTREMITY, ELBOW;  Surgeon: Jeremy Trinh MD;  Location: [facility] MAIN OR;  Service: Orthopedics;  Laterality: Left;    SPINE SURGERY      Discectomy Lumbar    TONSILLECTOMY         Family History   Problem Relation Age of Onset    Diabetes Mother     Hyperlipidemia Father     Aortic aneurysm Father     Alcohol abuse Sister     COPD Brother     Hypertension Brother     Arthritis Brother     Lung cancer Brother     Kidney disease Brother     Arthritis Maternal Grandmother     Osteoporosis Maternal Grandmother     Stroke Paternal Grandfather        Social History     Socioeconomic History    Marital status:    Tobacco Use    Smoking status: Former     Current packs/day: 0.00     Average packs/day: 0.5 packs/day for 43.8 years (21.9 ttl pk-yrs)     Types: Cigarettes     Start date:      Quit date: 10/11/2012     Years since quittin.6     Passive exposure: Never    Smokeless tobacco: Never    Tobacco comments:     Vapes 3mL-8   Vaping Use    Vaping status: Every Day    Substances: Flavoring, menthol 12 mg    Devices: Refillable tank   Substance and Sexual Activity    Alcohol use: No    Drug use: No     Sexual activity: Defer       History of Present Illness  The patient is a 74-year-old  female here for a sore on her right thumb.    The patient initially attributed the discomfort to a calcium deposit on her arm, which ruptured spontaneously approximately one week ago. She reported a soft, encapsulated discharge from the sore. She cleaned the area with alcohol and Neosporin prior to bedtime, ensuring the area is cleaned with soap and water. The sore did not result in any tears. The tip of her thumb, where she had prev. surgery, is numb.    The patient requests an ear cleaning due to poss cerumen impaction. She was fitted with a hearing aid, however, upon removal of the wire, she discovered a large amount of wax. She will be returning the hearing aid due to dissatisfaction with the device.    The patient reports experiencing anxiety and depression, a condition she had not previously encountered. She expresses interest in exploring treatment options. She recalls using Zoloft for a duration of three months following the death of her mother, but discontinued its use after three months due to lack of noticeable improvement.    Supplemental Information  The diarrhea has resolved. She then developed pain across her shoulders up her neck that lasted for close to a week. Her shoulders are still sore, but it is going away. She takes vitamin D 3 times a week, Monday, Wednesday, and Friday.        The following portions of the patient's history were reviewed and updated as appropriate: allergies, current medications, past family history, past medical history, past social history, past surgical history and problem list.    Review of Systems   Constitutional:  Negative for activity change, appetite change, unexpected weight gain and unexpected weight loss.   HENT:  Positive for hearing loss.    Musculoskeletal:  Positive for myalgias.   Skin:  Positive for wound. Negative for skin lesions.       Vitals:    06/07/24 1451    BP: 123/79   Pulse: 102   Resp: 14   Temp: 98.4 °F (36.9 °C)   SpO2: 97%       Objective   Physical Exam  Vitals and nursing note reviewed.   Constitutional:       General: She is not in acute distress.     Appearance: She is not ill-appearing or toxic-appearing.   HENT:      Head: Normocephalic and atraumatic.      Right Ear: Tympanic membrane, ear canal and external ear normal. There is no impacted cerumen.      Left Ear: Tympanic membrane, ear canal and external ear normal. There is no impacted cerumen.   Pulmonary:      Effort: Pulmonary effort is normal.   Musculoskeletal:        Hands:       Comments: +healing 1mm shallow ulcer w/ overlying skin; no surr erythema/ exudate   Neurological:      Mental Status: She is alert.   Psychiatric:         Attention and Perception: Attention and perception normal.         Mood and Affect: Affect is flat.         Speech: Speech normal.         Behavior: Behavior normal. Behavior is cooperative.         Thought Content: Thought content normal.         Cognition and Memory: Cognition normal.         Judgment: Judgment normal.       Physical Exam       BMI is within normal parameters. No other follow-up for BMI required.      Assessment & Plan   Diagnoses and all orders for this visit:    1. Skin ulcer, limited to breakdown of skin (Primary)    2. Anxiety with depression    Other orders  -     sertraline (Zoloft) 25 MG tablet; Take 1 tablet by mouth Every Night.  Dispense: 30 tablet; Refill: 1      Assessment & Plan  1. Ulceration on the right thumb.  The patient is advised to apply bacitracin once daily and maintain the area covered. The use of alcohol is discouraged    3. Anxiety and depression.  A discussion was held regarding various treatment options for anxiety and depression. A prescription for Zoloft 25 mg has been issued. Should the Zoloft prove ineffective, the dosage will be increased to 50 mg.    4. Immunization status.  The patient is recommended to receive the  RSV vaccine in 09/2024. She is also advised to receive the COVID-19 booster at her local pharmacy.    Follow-up  The patient is scheduled for a follow-up visit in 08/2024.     Results            Patient or patient representative verbalized consent for the use of Ambient Listening during the visit with  Shannan Larsen DO for chart documentation. 6/16/2024  11:12 EDT

## 2024-07-09 DIAGNOSIS — M53.3 SACROILIAC JOINT PAIN: ICD-10-CM

## 2024-07-09 RX ORDER — HYDROCODONE BITARTRATE AND ACETAMINOPHEN 7.5; 325 MG/1; MG/1
1 TABLET ORAL EVERY 6 HOURS PRN
Qty: 120 TABLET | Refills: 0 | Status: SHIPPED | OUTPATIENT
Start: 2024-07-09

## 2024-07-09 NOTE — TELEPHONE ENCOUNTER
Caller: Eva Borrego    Relationship: Self    Best call back number: [phone]     Requested Prescriptions:   Requested Prescriptions     Pending Prescriptions Disp Refills    HYDROcodone-acetaminophen (Norco) 7.5-325 MG per tablet 120 tablet 0     Sig: Take 1 tablet by mouth Every 6 (Six) Hours As Needed for Severe Pain.        Pharmacy where request should be sent: [id number] [place] [address] AT [facility] [address] [place] [address] - [phone] [facility] [phone]      Last office visit with prescribing clinician: 6/7/2024   Last telemedicine visit with prescribing clinician: Visit date not found   Next office visit with prescribing clinician: 8/19/2024     Additional details provided by patient:     Does the patient have less than a 3 day supply:  [x] Yes  [] No    Would you like a call back once the refill request has been completed: [] Yes [] No    If the office needs to give you a call back, can they leave a voicemail: [] Yes [] No    April Bryce Jane Rep   07/09/24 11:00 EDT

## 2024-08-01 ENCOUNTER — OFFICE VISIT (OUTPATIENT)
Dept: FAMILY MEDICINE CLINIC | Facility: CLINIC | Age: 74
End: 2024-08-01
Payer: MEDICARE

## 2024-08-01 VITALS
TEMPERATURE: 98 F | DIASTOLIC BLOOD PRESSURE: 64 MMHG | HEART RATE: 96 BPM | RESPIRATION RATE: 16 BRPM | WEIGHT: 115 LBS | BODY MASS INDEX: 20.38 KG/M2 | OXYGEN SATURATION: 95 % | HEIGHT: 63 IN | SYSTOLIC BLOOD PRESSURE: 105 MMHG

## 2024-08-01 DIAGNOSIS — R03.1 LOW BLOOD PRESSURE READING: ICD-10-CM

## 2024-08-01 DIAGNOSIS — R19.7 DIARRHEA, UNSPECIFIED TYPE: Primary | ICD-10-CM

## 2024-08-01 PROCEDURE — 99213 OFFICE O/P EST LOW 20 MIN: CPT | Performed by: FAMILY MEDICINE

## 2024-08-01 PROCEDURE — 82306 VITAMIN D 25 HYDROXY: CPT | Performed by: FAMILY MEDICINE

## 2024-08-01 PROCEDURE — 1159F MED LIST DOCD IN RCRD: CPT | Performed by: FAMILY MEDICINE

## 2024-08-01 PROCEDURE — 85025 COMPLETE CBC W/AUTO DIFF WBC: CPT | Performed by: FAMILY MEDICINE

## 2024-08-01 PROCEDURE — 1160F RVW MEDS BY RX/DR IN RCRD: CPT | Performed by: FAMILY MEDICINE

## 2024-08-01 PROCEDURE — 84439 ASSAY OF FREE THYROXINE: CPT | Performed by: FAMILY MEDICINE

## 2024-08-01 PROCEDURE — 84443 ASSAY THYROID STIM HORMONE: CPT | Performed by: FAMILY MEDICINE

## 2024-08-01 PROCEDURE — 80053 COMPREHEN METABOLIC PANEL: CPT | Performed by: FAMILY MEDICINE

## 2024-08-01 PROCEDURE — 1125F AMNT PAIN NOTED PAIN PRSNT: CPT | Performed by: FAMILY MEDICINE

## 2024-08-01 PROCEDURE — 36415 COLL VENOUS BLD VENIPUNCTURE: CPT | Performed by: FAMILY MEDICINE

## 2024-08-01 NOTE — PROGRESS NOTES
Venipuncture performed in left arm by Radha Lawrence CMA  with good hemostasis. Patient tolerated well. 08/01/24 Radha Lawrence CMA

## 2024-08-01 NOTE — PROGRESS NOTES
Subjective   Eva Borrego is a 74 y.o. female.     Chief Complaint   Patient presents with    Diarrhea     Diarrhea x 1 month. Patient has tried imodium and pepto bismol.         Current Outpatient Medications:     albuterol sulfate HFA (Ventolin HFA) 108 (90 Base) MCG/ACT inhaler, Inhale 2 puffs Every 6 (Six) Hours As Needed for Wheezing or Shortness of Air., Disp: 18 g, Rfl: 0    Breztri Aerosphere 160-9-4.8 MCG/ACT aerosol inhaler, Inhale 2 puffs 2 (Two) Times a Day., Disp: 32.1 g, Rfl: 3    [employer] IBUPROFEN PO, PRN, Disp: , Rfl:     denosumab (Prolia) 60 MG/ML solution prefilled syringe syringe, Inject 1 mL under the skin into the appropriate area as directed Every 6 (Six) Months., Disp: 180 mL, Rfl: 1    HYDROcodone-acetaminophen (Norco) 7.5-325 MG per tablet, Take 1 tablet by mouth Every 6 (Six) Hours As Needed for Severe Pain., Disp: 120 tablet, Rfl: 0    ipratropium-albuterol (DUO-NEB) 0.5-2.5 mg/3 ml nebulizer, Take 3 mL by nebulization 4 (Four) Times a Day As Needed for Wheezing or Shortness of Air., Disp: 360 mL, Rfl: 1    levothyroxine (SYNTHROID, LEVOTHROID) 100 MCG tablet, Take 1 tablet by mouth Daily., Disp: 90 tablet, Rfl: 1    metoprolol succinate XL (TOPROL-XL) 50 MG 24 hr tablet, Take 1 tablet by mouth Daily., Disp: 90 tablet, Rfl: 1    multivitamin with minerals tablet tablet, Take 1 tablet by mouth Daily., Disp: , Rfl:     omeprazole (priLOSEC) 20 MG capsule, Take 1 capsule by mouth Daily., Disp: 90 capsule, Rfl: 1    sertraline (Zoloft) 25 MG tablet, Take 1 tablet by mouth Every Night., Disp: 30 tablet, Rfl: 1    simvastatin (Zocor) 40 MG tablet, Take 1 tablet by mouth Every Night., Disp: 90 tablet, Rfl: 3    vitamin D3 125 MCG (5000 UT) capsule capsule, Take 1 capsule by mouth Every Other Day. Take 1 tablet on Monday,Wednesday and Friday. 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    Post-menopausal     Sacroiliac joint pain     Seasonal allergies        Past Surgical History:   Procedure Laterality Date    CHOLECYSTECTOMY      COLONOSCOPY      TA = 2021, rech              GSI    EYE SURGERY      B/L Cataract/ Lens Lt    JOINT REPLACEMENT Right     THR---2017    MASS EXCISION Left 10/11/2022    Procedure: EXCISION MASS UPPER EXTREMITY, ELBOW;  Surgeon: Jeremy Trinh MD;  Location: [facility] MAIN OR;  Service: Orthopedics;  Laterality: Left;    SPINE SURGERY      Discectomy Lumbar    TONSILLECTOMY         Family History   Problem Relation Age of Onset    Diabetes Mother     Hyperlipidemia Father     Aortic aneurysm Father     Alcohol abuse Sister     COPD Brother     Hypertension Brother     Arthritis Brother     Lung cancer Brother     Kidney disease Brother     Arthritis Maternal Grandmother     Osteoporosis Maternal Grandmother     Stroke Paternal Grandfather        Social History     Socioeconomic History    Marital status:    Tobacco Use    Smoking status: Former     Current packs/day: 0.00     Average packs/day: 0.5 packs/day for 43.8 years (21.9 ttl pk-yrs)     Types: Cigarettes     Start date:      Quit date: 10/11/2012     Years since quittin.8     Passive exposure: Never    Smokeless tobacco: Never    Tobacco comments:     Vapes 3mL-8   Vaping Use    Vaping status: Every Day    Substances: Flavoring, menthol 12 mg    Devices: Refillable tank   Substance and Sexual Activity    Alcohol use: No    Drug use: No    Sexual activity: Defer       History of Present  Illness  The patient is a 74-year-old  female here with complaints of diarrhea for 1 month.     She has been experiencing diarrhea for the past month, with 2 to 3 episodes per day. Initially, the diarrhea was intermittent throughout the day. Imodium provided temporary relief, and Pepto-Bismol provided some relief. She denies any presence of blood in her stool or black stools. She denies any pain or bloating. Her appetite has decreased due to fear of triggering the diarrhea. Her diet includes chicken noodle soup, cheese, and crackers. Her son, who lives down the road, also had diarrhea and a high fever. She had a fever at the onset of the diarrhea. She denies any hemorrhoids. She is due for a colonoscopy in 10/2024. She denies any excessive gas. She has avoided milk as it exacerbates her diarrhea.    She reports feeling weak. She has been taking Zoloft for the past 2 months, which has been effective. She denies any recent vacations.    She is on metoprolol for her blood pressure. Her blood pressure has never been this low before.     ALLERGIES  She is allergic to ERYTHROMYCIN, CELEBREX, and PERCOCET.        The following portions of the patient's history were reviewed and updated as appropriate: allergies, current medications, past family history, past medical history, past social history, past surgical history and problem list.    Review of Systems   Constitutional:  Positive for activity change, appetite change and unexpected weight loss. Negative for fever and unexpected weight gain.   Gastrointestinal:  Positive for diarrhea. Negative for abdominal distention, abdominal pain, blood in stool, constipation, nausea, vomiting, GERD and indigestion.   Skin:  Negative for rash.   Psychiatric/Behavioral:  Negative for sleep disturbance, depressed mood and stress.        Vitals:    08/01/24 0805   BP: (!) 83/54   Pulse: 75   Resp: 16   Temp: 98 °F (36.7 °C)   SpO2: 95%       Objective   Physical Exam  Vitals and  nursing note reviewed.   Constitutional:       General: She is not in acute distress.     Appearance: Normal appearance. She is well-developed and underweight. She is not ill-appearing or toxic-appearing.   HENT:      Head: Normocephalic and atraumatic.   Cardiovascular:      Rate and Rhythm: Normal rate and regular rhythm.      Heart sounds: Normal heart sounds. No murmur heard.  Pulmonary:      Effort: Pulmonary effort is normal. No respiratory distress.      Breath sounds: Examination of the right-upper field reveals decreased breath sounds. Examination of the left-upper field reveals decreased breath sounds. Examination of the right-middle field reveals decreased breath sounds. Examination of the left-middle field reveals decreased breath sounds. Examination of the right-lower field reveals decreased breath sounds. Examination of the left-lower field reveals decreased breath sounds. Decreased breath sounds present. No wheezing, rhonchi or rales.   Abdominal:      General: Abdomen is flat. Bowel sounds are normal.      Palpations: Abdomen is soft.      Tenderness: There is abdominal tenderness in the right upper quadrant and right lower quadrant. There is no guarding or rebound.   Skin:     General: Skin is warm and dry.      Findings: No rash.   Neurological:      Mental Status: She is alert and oriented to person, place, and time.      Cranial Nerves: No cranial nerve deficit.      Gait: Gait normal.   Psychiatric:         Mood and Affect: Mood normal.         Behavior: Behavior normal. Behavior is cooperative.         Thought Content: Thought content normal.         Judgment: Judgment normal.       Physical Exam  Vital Signs  Vitals show a blood pressure of 105/64.     BMI is within normal parameters. No other follow-up for BMI required.      Assessment & Plan   Diagnoses and all orders for this visit:    1. Diarrhea, unspecified type (Primary)  -     Comprehensive Metabolic Panel  -     CBC &  Differential      Assessment & Plan  1. Diarrhea.  She has lost about 7 pounds since 06/2024 and her blood pressure is low. She was advised to avoid all dairy products and adhere to a bland diet, avoiding greasy and spicy foods.     Blood work was ordered to check thyroid, vitamin D, sugar, kidney, liver, and CBC. She was advised to drink plenty of water and Gatorade. Should her symptoms persist despite eliminating dairy products, stool studies will be considered.     Results            Patient or patient representative verbalized consent for the use of Ambient Listening during the visit with  Shannan Larsen DO for chart documentation. 8/18/2024  14:23 EDT

## 2024-08-02 DIAGNOSIS — E03.9 ACQUIRED HYPOTHYROIDISM: Primary | ICD-10-CM

## 2024-08-02 LAB
25(OH)D3 SERPL-MCNC: 57.4 NG/ML (ref 30–100)
ALBUMIN SERPL-MCNC: 4.2 G/DL (ref 3.5–5.2)
ALBUMIN/GLOB SERPL: 1.8 G/DL
ALP SERPL-CCNC: 52 U/L (ref 39–117)
ALT SERPL W P-5'-P-CCNC: 13 U/L (ref 1–33)
ANION GAP SERPL CALCULATED.3IONS-SCNC: 8.8 MMOL/L (ref 5–15)
AST SERPL-CCNC: 21 U/L (ref 1–32)
BASOPHILS # BLD AUTO: 0.06 10*3/MM3 (ref 0–0.2)
BASOPHILS NFR BLD AUTO: 0.8 % (ref 0–1.5)
BILIRUB SERPL-MCNC: 0.3 MG/DL (ref 0–1.2)
BUN SERPL-MCNC: 13 MG/DL (ref 8–23)
BUN/CREAT SERPL: 19.4 (ref 7–25)
CALCIUM SPEC-SCNC: 9.9 MG/DL (ref 8.6–10.5)
CHLORIDE SERPL-SCNC: 101 MMOL/L (ref 98–107)
CO2 SERPL-SCNC: 28.2 MMOL/L (ref 22–29)
CREAT SERPL-MCNC: 0.67 MG/DL (ref 0.57–1)
DEPRECATED RDW RBC AUTO: 41.7 FL (ref 37–54)
EGFRCR SERPLBLD CKD-EPI 2021: 91.8 ML/MIN/1.73
EOSINOPHIL # BLD AUTO: 0.14 10*3/MM3 (ref 0–0.4)
EOSINOPHIL NFR BLD AUTO: 1.8 % (ref 0.3–6.2)
ERYTHROCYTE [DISTWIDTH] IN BLOOD BY AUTOMATED COUNT: 12.1 % (ref 12.3–15.4)
GLOBULIN UR ELPH-MCNC: 2.4 GM/DL
GLUCOSE SERPL-MCNC: 103 MG/DL (ref 65–99)
HCT VFR BLD AUTO: 41.3 % (ref 34–46.6)
HGB BLD-MCNC: 13.7 G/DL (ref 12–15.9)
IMM GRANULOCYTES # BLD AUTO: 0.03 10*3/MM3 (ref 0–0.05)
IMM GRANULOCYTES NFR BLD AUTO: 0.4 % (ref 0–0.5)
LYMPHOCYTES # BLD AUTO: 1.29 10*3/MM3 (ref 0.7–3.1)
LYMPHOCYTES NFR BLD AUTO: 16.3 % (ref 19.6–45.3)
MCH RBC QN AUTO: 31.2 PG (ref 26.6–33)
MCHC RBC AUTO-ENTMCNC: 33.2 G/DL (ref 31.5–35.7)
MCV RBC AUTO: 94.1 FL (ref 79–97)
MONOCYTES # BLD AUTO: 0.69 10*3/MM3 (ref 0.1–0.9)
MONOCYTES NFR BLD AUTO: 8.7 % (ref 5–12)
NEUTROPHILS NFR BLD AUTO: 5.68 10*3/MM3 (ref 1.7–7)
NEUTROPHILS NFR BLD AUTO: 72 % (ref 42.7–76)
NRBC BLD AUTO-RTO: 0 /100 WBC (ref 0–0.2)
PLATELET # BLD AUTO: 256 10*3/MM3 (ref 140–450)
PMV BLD AUTO: 11 FL (ref 6–12)
POTASSIUM SERPL-SCNC: 4 MMOL/L (ref 3.5–5.2)
PROT SERPL-MCNC: 6.6 G/DL (ref 6–8.5)
RBC # BLD AUTO: 4.39 10*6/MM3 (ref 3.77–5.28)
SODIUM SERPL-SCNC: 138 MMOL/L (ref 136–145)
T4 FREE SERPL-MCNC: 1.9 NG/DL (ref 0.92–1.68)
TSH SERPL DL<=0.05 MIU/L-ACNC: 0.14 UIU/ML (ref 0.27–4.2)
WBC NRBC COR # BLD AUTO: 7.89 10*3/MM3 (ref 3.4–10.8)

## 2024-08-05 ENCOUNTER — PATIENT MESSAGE (OUTPATIENT)
Dept: FAMILY MEDICINE CLINIC | Facility: CLINIC | Age: 74
End: 2024-08-05
Payer: MEDICARE

## 2024-08-12 ENCOUNTER — CLINICAL SUPPORT (OUTPATIENT)
Dept: FAMILY MEDICINE CLINIC | Facility: CLINIC | Age: 74
End: 2024-08-12
Payer: MEDICARE

## 2024-08-12 DIAGNOSIS — I50.42 CHRONIC COMBINED SYSTOLIC (CONGESTIVE) AND DIASTOLIC (CONGESTIVE) HEART FAILURE: Primary | ICD-10-CM

## 2024-08-12 PROCEDURE — 80061 LIPID PANEL: CPT | Performed by: FAMILY MEDICINE

## 2024-08-12 PROCEDURE — 36415 COLL VENOUS BLD VENIPUNCTURE: CPT | Performed by: FAMILY MEDICINE

## 2024-08-12 RX ORDER — SERTRALINE HYDROCHLORIDE 25 MG/1
25 TABLET, FILM COATED ORAL NIGHTLY
Qty: 30 TABLET | Refills: 2 | Status: SHIPPED | OUTPATIENT
Start: 2024-08-12

## 2024-08-12 NOTE — PROGRESS NOTES
Venipuncture performed in L ARM by RT Jeannie  with good hemostasis. Patient tolerated well. 08/12/24 Charisma Villegas, RT

## 2024-08-13 LAB
CHOLEST SERPL-MCNC: 134 MG/DL (ref 0–200)
HDLC SERPL-MCNC: 44 MG/DL (ref 40–60)
LDLC SERPL CALC-MCNC: 72 MG/DL (ref 0–100)
LDLC/HDLC SERPL: 1.61 {RATIO}
TRIGL SERPL-MCNC: 96 MG/DL (ref 0–150)
VLDLC SERPL-MCNC: 18 MG/DL (ref 5–40)

## 2024-08-19 ENCOUNTER — OFFICE VISIT (OUTPATIENT)
Dept: FAMILY MEDICINE CLINIC | Facility: CLINIC | Age: 74
End: 2024-08-19
Payer: MEDICARE

## 2024-08-19 VITALS
DIASTOLIC BLOOD PRESSURE: 69 MMHG | HEART RATE: 84 BPM | OXYGEN SATURATION: 95 % | WEIGHT: 116 LBS | BODY MASS INDEX: 20.55 KG/M2 | SYSTOLIC BLOOD PRESSURE: 119 MMHG | TEMPERATURE: 98.2 F | RESPIRATION RATE: 14 BRPM | HEIGHT: 63 IN

## 2024-08-19 DIAGNOSIS — G89.29 CHRONIC MIDLINE LOW BACK PAIN WITHOUT SCIATICA: Primary | ICD-10-CM

## 2024-08-19 DIAGNOSIS — M54.50 CHRONIC MIDLINE LOW BACK PAIN WITHOUT SCIATICA: Primary | ICD-10-CM

## 2024-08-19 PROCEDURE — 1160F RVW MEDS BY RX/DR IN RCRD: CPT | Performed by: FAMILY MEDICINE

## 2024-08-19 PROCEDURE — 1125F AMNT PAIN NOTED PAIN PRSNT: CPT | Performed by: FAMILY MEDICINE

## 2024-08-19 PROCEDURE — 1159F MED LIST DOCD IN RCRD: CPT | Performed by: FAMILY MEDICINE

## 2024-08-19 PROCEDURE — 99213 OFFICE O/P EST LOW 20 MIN: CPT | Performed by: FAMILY MEDICINE

## 2024-08-19 RX ORDER — LEVOTHYROXINE SODIUM 88 UG/1
88 TABLET ORAL DAILY
Qty: 90 TABLET | Refills: 0 | Status: SHIPPED | OUTPATIENT
Start: 2024-08-19

## 2024-08-19 RX ORDER — IBUPROFEN 200 MG
200 TABLET ORAL EVERY 6 HOURS PRN
COMMUNITY

## 2024-08-19 RX ORDER — HYDROCODONE BITARTRATE AND ACETAMINOPHEN 5; 325 MG/1; MG/1
1 TABLET ORAL EVERY 6 HOURS PRN
Qty: 120 TABLET | Refills: 0 | Status: SHIPPED | OUTPATIENT
Start: 2024-08-19

## 2024-08-19 NOTE — PROGRESS NOTES
Subjective   Eva Borrego is a 74 y.o. female.     Chief Complaint   Patient presents with    Hypertension    Pain         Current Outpatient Medications:     albuterol sulfate HFA (Ventolin HFA) 108 (90 Base) MCG/ACT inhaler, Inhale 2 puffs Every 6 (Six) Hours As Needed for Wheezing or Shortness of Air., Disp: 18 g, Rfl: 0    Breztri Aerosphere 160-9-4.8 MCG/ACT aerosol inhaler, Inhale 2 puffs 2 (Two) Times a Day., Disp: 32.1 g, Rfl: 3    [employer] IBUPROFEN PO, PRN, Disp: , Rfl:     denosumab (Prolia) 60 MG/ML solution prefilled syringe syringe, Inject 1 mL under the skin into the appropriate area as directed Every 6 (Six) Months., Disp: 180 mL, Rfl: 1    ibuprofen (ADVIL,MOTRIN) 200 MG tablet, Take 1 tablet by mouth Every 6 (Six) Hours As Needed for Mild Pain., Disp: , Rfl:     ipratropium-albuterol (DUO-NEB) 0.5-2.5 mg/3 ml nebulizer, Take 3 mL by nebulization 4 (Four) Times a Day As Needed for Wheezing or Shortness of Air., Disp: 360 mL, Rfl: 1    levothyroxine (SYNTHROID, LEVOTHROID) 88 MCG tablet, Take 1 tablet by mouth Daily., Disp: 90 tablet, Rfl: 0    metoprolol succinate XL (TOPROL-XL) 50 MG 24 hr tablet, Take 1 tablet by mouth Daily., Disp: 90 tablet, Rfl: 1    multivitamin with minerals tablet tablet, Take 1 tablet by mouth Daily., Disp: , Rfl:     omeprazole (priLOSEC) 20 MG capsule, Take 1 capsule by mouth Daily., Disp: 90 capsule, Rfl: 1    sertraline (ZOLOFT) 25 MG tablet, TAKE ONE TABLET BY MOUTH ONCE NIGHTLY, Disp: 30 tablet, Rfl: 2    simvastatin (Zocor) 40 MG tablet, Take 1 tablet by mouth Every Night., Disp: 90 tablet, Rfl: 3    vitamin D3 125 MCG (5000 UT) capsule capsule, Take 1 capsule by mouth Every Other Day. Take 1 tablet on Monday,Wednesday and Friday. 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    Post-menopausal     Sacroiliac joint pain     Seasonal allergies        Past Surgical History:   Procedure Laterality Date    CHOLECYSTECTOMY      COLONOSCOPY      TA = 2021, rech              GSI    EYE SURGERY      B/L Cataract/ Lens Lt    JOINT REPLACEMENT Right     THR---2017    MASS EXCISION Left 10/11/2022    Procedure: EXCISION MASS UPPER EXTREMITY, ELBOW;  Surgeon: Jeremy Trinh MD;  Location: [facility] MAIN OR;  Service: Orthopedics;  Laterality: Left;    SPINE SURGERY      Discectomy Lumbar    TONSILLECTOMY         Family History   Problem Relation Age of Onset    Diabetes Mother     Hyperlipidemia Father     Aortic aneurysm Father     Alcohol abuse Sister     COPD Brother     Hypertension Brother     Arthritis Brother     Lung cancer Brother     Kidney disease Brother     Arthritis Maternal Grandmother     Osteoporosis Maternal Grandmother     Stroke Paternal Grandfather        Social History     Socioeconomic History    Marital status:    Tobacco Use    Smoking status: Former     Current packs/day: 0.00     Average packs/day: 0.5 packs/day for 43.8 years (21.9 ttl pk-yrs)     Types: Cigarettes     Start date:      Quit date: 10/11/2012     Years since quittin.9     Passive exposure: Never    Smokeless tobacco: Never    Tobacco comments:     Vapes 3mL-8   Vaping Use    Vaping status: Every Day    Substances: Flavoring, menthol 12 mg    Devices: Refillable tank   Substance and Sexual Activity    Alcohol use:  No    Drug use: No    Sexual activity: Defer       History of Present Illness  The patient is a 74-year-old  female who presents for follow-up on chronic pain and hypertension with complaints of bilateral hand tremor for the last month w/ hx/o [id number].    She was found to have an elevated free T4 on labs and has reduced her thyroid dose on Saturdays and Sundays. However, since the tremors persist, her thyroid dose will be reduced completely from 100 mcg daily to 88 mcg to see if this helps. She reports no palpitations or chest pain, just the hand tremors.    She reports no issues with constipation or diarrhea and is not experiencing any abdominal discomfort. Her sleep pattern is regular, typically going to bed around midnight and waking up at 8:00 AM. She continues to take vitamin D supplements every other day.        The following portions of the patient's history were reviewed and updated as appropriate: allergies, current medications, past family history, past medical history, past social history, past surgical history and problem list.    Review of Systems   Constitutional:  Negative for activity change, appetite change, unexpected weight gain and unexpected weight loss.   Respiratory:  Negative for cough, shortness of breath and wheezing.    Cardiovascular:  Negative for chest pain, palpitations and leg swelling.   Gastrointestinal:  Negative for abdominal pain, constipation and diarrhea.   Neurological:  Positive for tremors.   Psychiatric/Behavioral:  Negative for sleep disturbance.        Vitals:    08/19/24 1249   BP: 119/69   Pulse: 84   Resp: 14   Temp: 98.2 °F (36.8 °C)   SpO2: 95%       Objective   Physical Exam  Vitals and nursing note reviewed.   Constitutional:       General: She is not in acute distress.     Appearance: She is not ill-appearing or toxic-appearing.   Cardiovascular:      Rate and Rhythm: Normal rate and regular rhythm.   Pulmonary:      Effort: Pulmonary effort is  normal.      Breath sounds: Decreased air movement present. Decreased breath sounds present. No wheezing, rhonchi or rales.   Neurological:      Mental Status: She is alert and oriented to person, place, and time.      Cranial Nerves: No cranial nerve deficit.      Gait: Gait normal.   Psychiatric:         Attention and Perception: Attention and perception normal.         Mood and Affect: Mood and affect normal.         Speech: Speech normal.         Behavior: Behavior normal. Behavior is cooperative.         Thought Content: Thought content normal.         Cognition and Memory: Cognition and memory normal.         Judgment: Judgment normal.       Physical Exam       BMI is within normal parameters. No other follow-up for BMI required.      Assessment & Plan   Diagnoses and all orders for this visit:    1. Chronic midline low back pain without sciatica (Primary)  -     HYDROcodone-acetaminophen (NORCO) 5-325 MG per tablet; Take 1 tablet by mouth Every 6 (Six) Hours As Needed for Severe Pain.  Dispense: 120 tablet; Refill: 0    Other orders  -     levothyroxine (SYNTHROID, LEVOTHROID) 88 MCG tablet; Take 1 tablet by mouth Daily.  Dispense: 90 tablet; Refill: 0      Assessment & Plan  1. Bilateral hand tremor.  The patient reports bilateral hand tremor for the last month. This symptom may be related to her thyroid medication. Her free T4 was found to be elevated on recent labs. To address this, her thyroid medication dosage has been reduced from 100 mcg to 88 mcg daily. She reports no palpitations, chest pain, diarrhea, or constipation. If the tremors persist, further evaluation and adjustments to her thyroid medication may be necessary.    2. Chronic pain.  The patient's chronic pain management regimen has been adjusted. She requested a reduction in her pain medication dosage, which has been implemented. Continued monitoring of her pain levels and medication efficacy is recommended. Further adjustments may be made  based on her response to the current regimen.    3. Hypertension.  The patient's hypertension is being managed with her current medication regimen. Continued monitoring of her blood pressure is recommended to ensure it remains well-controlled.    4. Sleep disturbances.  The patient reports sleeping late, going to bed around midnight and waking up around 8 AM, resulting in approximately 8 hours of sleep. She reports no issues with sleep quality.    5. Vitamin D supplementation.  The patient continues to take vitamin D every other day, with an additional dose on Fridays. This regimen should be maintained to ensure adequate vitamin D levels.    Follow-up  The patient will follow up in 2 to 3 months, or sooner if necessary.     Results  Laboratory Studies            Patient or patient representative verbalized consent for the use of Ambient Listening during the visit with  Shannan Larsen DO for chart documentation. 9/8/2024  08:08 EDT

## 2024-09-06 ENCOUNTER — OFFICE VISIT (OUTPATIENT)
Dept: CARDIOLOGY | Facility: CLINIC | Age: 74
End: 2024-09-06
Payer: MEDICARE

## 2024-09-06 VITALS
WEIGHT: 116 LBS | RESPIRATION RATE: 18 BRPM | SYSTOLIC BLOOD PRESSURE: 111 MMHG | HEIGHT: 63 IN | DIASTOLIC BLOOD PRESSURE: 74 MMHG | BODY MASS INDEX: 20.55 KG/M2 | HEART RATE: 79 BPM

## 2024-09-06 DIAGNOSIS — R00.0 SINUS TACHYCARDIA: Primary | ICD-10-CM

## 2024-09-06 DIAGNOSIS — R00.0 TACHYCARDIA: ICD-10-CM

## 2024-09-06 DIAGNOSIS — E78.49 OTHER HYPERLIPIDEMIA: ICD-10-CM

## 2024-09-18 ENCOUNTER — OFFICE VISIT (OUTPATIENT)
Dept: ORTHOPEDIC SURGERY | Facility: CLINIC | Age: 74
End: 2024-09-18
Payer: MEDICARE

## 2024-09-18 VITALS — RESPIRATION RATE: 20 BRPM | OXYGEN SATURATION: 97 % | BODY MASS INDEX: 20.55 KG/M2 | WEIGHT: 116 LBS | HEIGHT: 63 IN

## 2024-09-18 DIAGNOSIS — L94.2 CALCINOSIS CUTIS: Primary | ICD-10-CM

## 2024-09-20 DIAGNOSIS — G89.29 CHRONIC MIDLINE LOW BACK PAIN WITHOUT SCIATICA: ICD-10-CM

## 2024-09-20 DIAGNOSIS — M54.50 CHRONIC MIDLINE LOW BACK PAIN WITHOUT SCIATICA: ICD-10-CM

## 2024-09-20 RX ORDER — HYDROCODONE BITARTRATE AND ACETAMINOPHEN 5; 325 MG/1; MG/1
1 TABLET ORAL EVERY 6 HOURS PRN
Qty: 120 TABLET | Refills: 0 | Status: SHIPPED | OUTPATIENT
Start: 2024-09-20

## 2024-09-23 ENCOUNTER — PRIOR AUTHORIZATION (OUTPATIENT)
Dept: FAMILY MEDICINE CLINIC | Facility: CLINIC | Age: 74
End: 2024-09-23
Payer: MEDICARE

## 2024-10-02 RX ORDER — SIMVASTATIN 40 MG
40 TABLET ORAL NIGHTLY
Qty: 90 TABLET | Refills: 3 | Status: SHIPPED | OUTPATIENT
Start: 2024-10-02

## 2024-10-02 RX ORDER — METOPROLOL SUCCINATE 50 MG/1
50 TABLET, EXTENDED RELEASE ORAL DAILY
Qty: 30 TABLET | Refills: 1 | Status: SHIPPED | OUTPATIENT
Start: 2024-10-02

## 2024-10-04 ENCOUNTER — OFFICE VISIT (OUTPATIENT)
Dept: FAMILY MEDICINE CLINIC | Facility: CLINIC | Age: 74
End: 2024-10-04
Payer: MEDICARE

## 2024-10-04 VITALS
WEIGHT: 118 LBS | BODY MASS INDEX: 20.91 KG/M2 | TEMPERATURE: 98 F | HEIGHT: 63 IN | SYSTOLIC BLOOD PRESSURE: 114 MMHG | HEART RATE: 72 BPM | DIASTOLIC BLOOD PRESSURE: 70 MMHG | OXYGEN SATURATION: 93 % | RESPIRATION RATE: 16 BRPM

## 2024-10-04 DIAGNOSIS — Z23 NEED FOR INFLUENZA VACCINATION: ICD-10-CM

## 2024-10-04 DIAGNOSIS — L60.8 DISCOLORED NAILS: Primary | ICD-10-CM

## 2024-10-04 DIAGNOSIS — E03.9 ACQUIRED HYPOTHYROIDISM: ICD-10-CM

## 2024-10-04 PROCEDURE — 1125F AMNT PAIN NOTED PAIN PRSNT: CPT | Performed by: FAMILY MEDICINE

## 2024-10-04 PROCEDURE — 87102 FUNGUS ISOLATION CULTURE: CPT | Performed by: FAMILY MEDICINE

## 2024-10-04 PROCEDURE — 84439 ASSAY OF FREE THYROXINE: CPT | Performed by: FAMILY MEDICINE

## 2024-10-04 PROCEDURE — G0008 ADMIN INFLUENZA VIRUS VAC: HCPCS | Performed by: FAMILY MEDICINE

## 2024-10-04 PROCEDURE — 90662 IIV NO PRSV INCREASED AG IM: CPT | Performed by: FAMILY MEDICINE

## 2024-10-04 PROCEDURE — 99213 OFFICE O/P EST LOW 20 MIN: CPT | Performed by: FAMILY MEDICINE

## 2024-10-04 PROCEDURE — 36415 COLL VENOUS BLD VENIPUNCTURE: CPT | Performed by: FAMILY MEDICINE

## 2024-10-04 PROCEDURE — 84443 ASSAY THYROID STIM HORMONE: CPT | Performed by: FAMILY MEDICINE

## 2024-10-04 RX ORDER — LEVOTHYROXINE SODIUM 88 UG/1
TABLET ORAL
Status: SHIPPED
Start: 2024-10-04 | End: 2024-10-05 | Stop reason: SDUPTHER

## 2024-10-04 RX ORDER — TERBINAFINE HYDROCHLORIDE 250 MG/1
250 TABLET ORAL DAILY
Qty: 60 TABLET | Refills: 0 | Status: SHIPPED | OUTPATIENT
Start: 2024-10-04

## 2024-10-04 RX ORDER — ALBUTEROL SULFATE 90 UG/1
2 INHALANT RESPIRATORY (INHALATION) EVERY 6 HOURS PRN
Qty: 18 G | Refills: 1 | Status: SHIPPED | OUTPATIENT
Start: 2024-10-04

## 2024-10-04 NOTE — PROGRESS NOTES
Subjective   Eva Borrego is a 74 y.o. female.     Chief Complaint   Patient presents with    Nail Problem     Patients right thumb nail is discolored and feels like it is going to fall off.     Injections     Patient was given the influenza injection while in the office today.          Current Outpatient Medications:     albuterol sulfate HFA (Ventolin HFA) 108 (90 Base) MCG/ACT inhaler, Inhale 2 puffs Every 6 (Six) Hours As Needed for Wheezing or Shortness of Air., Disp: 18 g, Rfl: 1    [employer] IBUPROFEN PO, PRN, Disp: , Rfl:     denosumab (Prolia) 60 MG/ML solution prefilled syringe syringe, Inject 1 mL under the skin into the appropriate area as directed Every 6 (Six) Months., Disp: 180 mL, Rfl: 1    HYDROcodone-acetaminophen (NORCO) 5-325 MG per tablet, Take 1 tablet by mouth Every 6 (Six) Hours As Needed for Severe Pain., Disp: 120 tablet, Rfl: 0    ibuprofen (ADVIL,MOTRIN) 200 MG tablet, Take 1 tablet by mouth Every 6 (Six) Hours As Needed for Mild Pain., Disp: , Rfl:     ipratropium-albuterol (DUO-NEB) 0.5-2.5 mg/3 ml nebulizer, Take 3 mL by nebulization 4 (Four) Times a Day As Needed for Wheezing or Shortness of Air., Disp: 360 mL, Rfl: 1    metoprolol succinate XL (TOPROL-XL) 50 MG 24 hr tablet, TAKE 1 TABLET BY MOUTH DAILY, Disp: 30 tablet, Rfl: 1    multivitamin with minerals tablet tablet, Take 1 tablet by mouth Daily., Disp: , Rfl:     omeprazole (priLOSEC) 20 MG capsule, Take 1 capsule by mouth Daily., Disp: 90 capsule, Rfl: 1    sertraline (ZOLOFT) 25 MG tablet, TAKE ONE TABLET BY MOUTH ONCE NIGHTLY, Disp: 30 tablet, Rfl: 2    simvastatin (ZOCOR) 40 MG tablet, TAKE ONE TABLET BY MOUTH ONCE NIGHTLY, Disp: 90 tablet, Rfl: 3    vitamin D3 125 MCG (5000 UT) capsule capsule, Take 1 capsule by mouth Every Other Day. Take 1 tablet on Monday,Wednesday and Friday. (Patient taking differently: Take 1 capsule by mouth Every Other Day.), Disp: 30 capsule, Rfl: 0    Breztri Aerosphere 160-9-4.8 MCG/ACT aerosol  inhaler, Inhale 2 puffs 2 (Two) Times a Day., Disp: 32.1 g, Rfl: 3    levothyroxine (SYNTHROID, LEVOTHROID) 88 MCG tablet, 1 po Qday M-F and 1/2 tab on Sat and Sun, Disp: 90 tablet, Rfl: 2    nystatin (MYCOSTATIN) 100,000 unit/mL suspension, Swish and swallow 5 mL 4 (Four) Times a Day. 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    CHOL     COPD     DEXA     2017 =( -0.1/ -3.0); 2019 =(0.7/ -2.9); 2020 = (0.9/ -2.7); 2023= (0.5/ -2.5)    GERD     Hypothyroidism     MAMMO     NEG = 2018/ 2019/ 2020/ 2022/ 2023/ 2024    Osteoarthritis     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    Post-menopausal     Sacroiliac joint pain     Seasonal allergies        Past Surgical History:   Procedure Laterality Date    CHOLECYSTECTOMY      COLONOSCOPY      TA = 2018/ 2021, rech 2024             GSI    EYE SURGERY      B/L Cataract/ Lens Lt    JOINT REPLACEMENT Right     THR---2002/ 2017    MASS EXCISION Left 10/11/2022    Procedure: EXCISION MASS UPPER EXTREMITY, ELBOW;  Surgeon: Jeremy Trinh MD;  Location: [facility] MAIN OR;  Service: Orthopedics;  Laterality: Left;    SPINE SURGERY      Discectomy Lumbar    TONSILLECTOMY         Family History   Problem Relation Age of Onset    Diabetes Mother     Hyperlipidemia Father     Aortic aneurysm Father     Alcohol abuse Sister     COPD Brother     Hypertension Brother     Arthritis  Brother     Lung cancer Brother     Kidney disease Brother     Arthritis Maternal Grandmother     Osteoporosis Maternal Grandmother     Stroke Paternal Grandfather        Social History     Socioeconomic History    Marital status:    Tobacco Use    Smoking status: Former     Current packs/day: 0.00     Average packs/day: 0.5 packs/day for 43.8 years (21.9 ttl pk-yrs)     Types: Cigarettes     Start date:      Quit date: 10/11/2012     Years since quittin.0     Passive exposure: Never    Smokeless tobacco: Never    Tobacco comments:     Vapes 3mL-8   Vaping Use    Vaping status: Every Day    Substances: Flavoring, menthol 12 mg    Devices: [employer] tank   Substance and Sexual Activity    Alcohol use: No    Drug use: No    Sexual activity: Defer       History of Present Illness  The patient is a 74-year-old  female who presents for evaluation of multiple medical concerns.    She has noticed a change in the color of one of her fingernails over the past month. The discoloration started at the top and gradually spread downwards, turning dark red before becoming yellow. She reports no known damage to her nailbed, no habit of picking at it, and no history of psoriasis. Her toenails are in good condition. She does not frequently use detergents or  and has been hesitant to clip the affected nail.    She requires a new rescue inhaler, although she does not use them frequently.    She has an upcoming appointment with a rheumatologist, arranged by her orthopedic doctor, Dr. Karina Morales, due to a diagnosis of calcinosis cutis. This condition was identified through x-rays during her annual arm check-up. The calcinosis cutis is growing again and causing pain, particularly in her wrist, forcing her to wear her watch on the other hand. She has not yet received a call from the Rheumatology office.    She has not yet received the RSV vaccine. She is considering getting the COVID-19 booster shot  and the influenza vaccine today. She has not had a mammogram this year. She is unsure about the status of her colonoscopy and whether she needs to schedule another one.    She has reduced her thyroid medication dosage and is managing well with the lower dose. She is currently taking 0.88 mg of her thyroid medication, half a tablet on weekends and a full tablet on weekdays. She is pleased that her diarrhea has resolved.    She is also interested in discussing the possibility of a hemorrhoidectomy.    SOCIAL HISTORY  She vapes.    ALLERGIES  She is allergic to CELEBREX, PERCOCET, and ERYTHROMYCIN.    IMMUNIZATIONS  She has received both doses of shingles vaccine. She has received pneumonia booster in 2021.        The following portions of the patient's history were reviewed and updated as appropriate: allergies, current medications, past family history, past medical history, past social history, past surgical history and problem list.    Review of Systems   Gastrointestinal:  Negative for constipation and diarrhea.   Musculoskeletal:  Positive for arthralgias and myalgias.   Skin:  Positive for color change and skin lesions.       Vitals:    10/04/24 1258   BP: 114/70   Pulse: 72   Resp: 16   Temp: 98 °F (36.7 °C)   SpO2: 93%       Objective   Physical Exam  Vitals and nursing note reviewed.   Constitutional:       General: She is not in acute distress.     Appearance: She is not ill-appearing or toxic-appearing.   HENT:      Head: Normocephalic and atraumatic.   Pulmonary:      Effort: Pulmonary effort is normal.   Musculoskeletal:        Hands:       Comments: +yellow discolored nail on Rt hand digit #1 w/ base of nail irregular but dark pink   Neurological:      Mental Status: She is alert and oriented to person, place, and time.      Cranial Nerves: No cranial nerve deficit.      Gait: Gait normal.   Psychiatric:         Attention and Perception: Attention and perception normal.         Mood and Affect: Mood and  affect normal.         Speech: Speech normal.         Behavior: Behavior normal. Behavior is cooperative.         Thought Content: Thought content normal.         Cognition and Memory: Cognition and memory normal.         Judgment: Judgment normal.       Physical Exam    BMI is within normal parameters. No other follow-up for BMI required.      Assessment & Plan   Diagnoses and all orders for this visit:    1. Discolored nails (Primary)  -     Fungus Culture - Nail, Hand, Digit Right; Future  -     Fungus Culture - Nail, Hand, Digit Right    2. Acquired hypothyroidism  -     TSH  -     T4, Free    3. Need for influenza vaccination  -     Fluzone High-Dose 65+yrs    Other orders  -     terbinafine (lamiSIL) 250 MG tablet; Take 1 tablet by mouth Daily.  Dispense: 60 tablet; Refill: 0  -     albuterol sulfate HFA (Ventolin HFA) 108 (90 Base) MCG/ACT inhaler; Inhale 2 puffs Every 6 (Six) Hours As Needed for Wheezing or Shortness of Air.  Dispense: 18 g; Refill: 1  -     Discontinue: levothyroxine (SYNTHROID, LEVOTHROID) 88 MCG tablet; 1 po Qday M-F and 1/2 tab on Sat and Sun      Assessment & Plan  1. Suspected fungal nail infection.  The appearance of the nail suggests a possible fungal infection, although the base exhibits characteristics similar to psoriasis. A sample of the nail will be sent for fungal culture. If confirmed as a fungal infection, antifungal medication will be prescribed for a duration of 2 months. She was advised to keep the nail trimmed short to prevent it from catching on anything. If the culture is negative for fungus, the medication will not be helpful.    2. Medication refill.  A prescription for a rescue inhaler with one refill will be provided.    3. Calcinosis cutis.  She was informed that further lab work will be conducted by the rheumatologist. The patient mentioned that an autoimmune factor came up in her blood tests, and the orthopedic specialist suggested a possible link to rheumatoid  arthritis. She was referred to a rheumatologist for further evaluation.    4. COPD.  She qualifies for the RSV vaccine due to her COPD and can receive it at the pharmacy. The new COVID-19 booster is available at the pharmacy. The high-dose influenza vaccine will be administered today.    5. Thyroid management.  Her thyroid medication dosage was adjusted in August. She is currently taking 0.88 mg, with half a tablet on Saturday and Sunday, and a whole tablet from Monday to Friday. A thyroid level check will be conducted today to monitor the effectiveness of the dosage adjustment.    6. Health maintenance.  Her DEXA scan is valid until next year. She has completed her colonoscopy, but the gastroenterologist has recommended a repeat this year due to the presence of 3 polyps. The interval between colonoscopies depends on whether the polyps were precancerous or not. She was advised to wait until the end of the year for a call from the gastroenterologist regarding her colonoscopy. If the gastroenterologist does not contact her within a reasonable time, I will reach out to them to determine if she is due for a colonoscopy now or in a few years.       Results  Laboratory Studies  Liver enzymes are normal.          Patient or patient representative verbalized consent for the use of Ambient Listening during the visit with  Shannan Larsen DO for chart documentation. 10/22/2024  13:34 EDT

## 2024-10-04 NOTE — PROGRESS NOTES
Venipuncture performed in left arm by Radha Lawrence CMA  with good hemostasis. Patient tolerated well. 10/04/24 Radha Lawrence CMA

## 2024-10-05 ENCOUNTER — PATIENT MESSAGE (OUTPATIENT)
Dept: FAMILY MEDICINE CLINIC | Facility: CLINIC | Age: 74
End: 2024-10-05
Payer: MEDICARE

## 2024-10-05 LAB
T4 FREE SERPL-MCNC: 1.6 NG/DL (ref 0.92–1.68)
TSH SERPL DL<=0.05 MIU/L-ACNC: 0.18 UIU/ML (ref 0.27–4.2)

## 2024-10-05 RX ORDER — LEVOTHYROXINE SODIUM 88 UG/1
TABLET ORAL
Qty: 90 TABLET | Refills: 2 | Status: SHIPPED | OUTPATIENT
Start: 2024-10-05

## 2024-10-11 ENCOUNTER — PATIENT MESSAGE (OUTPATIENT)
Dept: FAMILY MEDICINE CLINIC | Facility: CLINIC | Age: 74
End: 2024-10-11
Payer: MEDICARE

## 2024-10-11 LAB — FUNGUS WND CULT: ABNORMAL

## 2024-10-15 ENCOUNTER — OFFICE VISIT (OUTPATIENT)
Dept: FAMILY MEDICINE CLINIC | Facility: CLINIC | Age: 74
End: 2024-10-15
Payer: MEDICARE

## 2024-10-15 VITALS
HEART RATE: 79 BPM | BODY MASS INDEX: 20.91 KG/M2 | TEMPERATURE: 98.2 F | DIASTOLIC BLOOD PRESSURE: 67 MMHG | SYSTOLIC BLOOD PRESSURE: 104 MMHG | WEIGHT: 118 LBS | HEIGHT: 63 IN | OXYGEN SATURATION: 92 % | RESPIRATION RATE: 16 BRPM

## 2024-10-15 DIAGNOSIS — R22.0 CHEEK SWELLING: ICD-10-CM

## 2024-10-15 DIAGNOSIS — J43.1 PANLOBULAR EMPHYSEMA: ICD-10-CM

## 2024-10-15 DIAGNOSIS — B37.0 THRUSH, ORAL: Primary | ICD-10-CM

## 2024-10-15 PROCEDURE — 1125F AMNT PAIN NOTED PAIN PRSNT: CPT | Performed by: FAMILY MEDICINE

## 2024-10-15 PROCEDURE — 99213 OFFICE O/P EST LOW 20 MIN: CPT | Performed by: FAMILY MEDICINE

## 2024-10-15 RX ORDER — NYSTATIN 100000 [USP'U]/ML
500000 SUSPENSION ORAL 4 TIMES DAILY
Qty: 473 ML | Refills: 1 | Status: SHIPPED | OUTPATIENT
Start: 2024-10-15

## 2024-10-15 RX ORDER — BUDESONIDE, GLYCOPYRROLATE, AND FORMOTEROL FUMARATE 160; 9; 4.8 UG/1; UG/1; UG/1
2 AEROSOL, METERED RESPIRATORY (INHALATION) 2 TIMES DAILY
Qty: 32.1 G | Refills: 3 | Status: SHIPPED | OUTPATIENT
Start: 2024-10-15

## 2024-10-15 RX ORDER — TRIAMCINOLONE ACETONIDE 55 UG/1
2 SPRAY, METERED NASAL DAILY
Start: 2024-10-15 | End: 2025-10-15

## 2024-10-15 RX ORDER — TIOTROPIUM BROMIDE AND OLODATEROL 3.124; 2.736 UG/1; UG/1
2 SPRAY, METERED RESPIRATORY (INHALATION)
Qty: 4 G | Refills: 0 | COMMUNITY
Start: 2024-10-15

## 2024-10-15 NOTE — PROGRESS NOTES
Subjective   Eva Borrego is a 74 y.o. female.     Chief Complaint   Patient presents with    Mass     Patient has a swollen spot on her face that is red and warm that she first noticed yesterday.          Current Outpatient Medications:     albuterol sulfate HFA (Ventolin HFA) 108 (90 Base) MCG/ACT inhaler, Inhale 2 puffs Every 6 (Six) Hours As Needed for Wheezing or Shortness of Air., Disp: 18 g, Rfl: 1    Breztri Aerosphere 160-9-4.8 MCG/ACT aerosol inhaler, Inhale 2 puffs 2 (Two) Times a Day., Disp: 32.1 g, Rfl: 3    [employer] IBUPROFEN PO, PRN, Disp: , Rfl:     denosumab (Prolia) 60 MG/ML solution prefilled syringe syringe, Inject 1 mL under the skin into the appropriate area as directed Every 6 (Six) Months., Disp: 180 mL, Rfl: 1    HYDROcodone-acetaminophen (NORCO) 5-325 MG per tablet, Take 1 tablet by mouth Every 6 (Six) Hours As Needed for Severe Pain., Disp: 120 tablet, Rfl: 0    ibuprofen (ADVIL,MOTRIN) 200 MG tablet, Take 1 tablet by mouth Every 6 (Six) Hours As Needed for Mild Pain., Disp: , Rfl:     ipratropium-albuterol (DUO-NEB) 0.5-2.5 mg/3 ml nebulizer, Take 3 mL by nebulization 4 (Four) Times a Day As Needed for Wheezing or Shortness of Air., Disp: 360 mL, Rfl: 1    levothyroxine (SYNTHROID, LEVOTHROID) 88 MCG tablet, 1 po Qday M-F and 1/2 tab on Sat and Sun, Disp: 90 tablet, Rfl: 2    metoprolol succinate XL (TOPROL-XL) 50 MG 24 hr tablet, TAKE 1 TABLET BY MOUTH DAILY, Disp: 30 tablet, Rfl: 1    multivitamin with minerals tablet tablet, Take 1 tablet by mouth Daily., Disp: , Rfl:     omeprazole (priLOSEC) 20 MG capsule, Take 1 capsule by mouth Daily., Disp: 90 capsule, Rfl: 1    sertraline (ZOLOFT) 25 MG tablet, TAKE ONE TABLET BY MOUTH ONCE NIGHTLY, Disp: 30 tablet, Rfl: 2    simvastatin (ZOCOR) 40 MG tablet, TAKE ONE TABLET BY MOUTH ONCE NIGHTLY, Disp: 90 tablet, Rfl: 3    terbinafine (lamiSIL) 250 MG tablet, Take 1 tablet by mouth Daily., Disp: 60 tablet, Rfl: 0    vitamin D3 125 MCG (5000  UT) capsule capsule, Take 1 capsule by mouth Every Other Day. Take 1 tablet on Monday,Wednesday and Friday. (Patient taking differently: Take 1 capsule by mouth Every Other Day.), Disp: 30 capsule, Rfl: 0    nystatin (MYCOSTATIN) 100,000 unit/mL suspension, Swish and swallow 5 mL 4 (Four) Times a Day. 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    CHOL     COPD     DEXA     2017 =( -0.1/ -3.0); 2019 =(0.7/ -2.9); 2020 = (0.9/ -2.7); 2023= (0.5/ -2.5)    GERD     Hypothyroidism     MAMMO     NEG = 2018/ 2019/ 2020/ 2022/ 2023/ 2024    Osteoarthritis     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    Post-menopausal     Sacroiliac joint pain     Seasonal allergies        Past Surgical History:   Procedure Laterality Date    CHOLECYSTECTOMY      COLONOSCOPY      TA = 2018/ 2021, rech 2024             GSI    EYE SURGERY      B/L Cataract/ Lens Lt    JOINT REPLACEMENT Right     THR---2002/ 2017    MASS EXCISION Left 10/11/2022    Procedure: EXCISION MASS UPPER EXTREMITY, ELBOW;  Surgeon: Jeremy Trinh MD;  Location: [facility] MAIN OR;  Service: Orthopedics;  Laterality: Left;    SPINE SURGERY      Discectomy Lumbar    TONSILLECTOMY         Family History   Problem Relation Age of Onset    Diabetes Mother     Hyperlipidemia Father     Aortic aneurysm Father     Alcohol abuse Sister     COPD Brother     Hypertension Brother     Arthritis Brother     Lung cancer Brother     Kidney disease Brother     Arthritis  Maternal Grandmother     Osteoporosis Maternal Grandmother     Stroke Paternal Grandfather        Social History     Socioeconomic History    Marital status:    Tobacco Use    Smoking status: Former     Current packs/day: 0.00     Average packs/day: 0.5 packs/day for 43.8 years (21.9 ttl pk-yrs)     Types: Cigarettes     Start date:      Quit date: 10/11/2012     Years since quittin.0     Passive exposure: Never    Smokeless tobacco: Never    Tobacco comments:     Vapes 3mL-8   Vaping Use    Vaping status: Every Day    Substances: Flavoring, menthol 12 mg    Devices: [employer]   Substance and Sexual Activity    Alcohol use: No    Drug use: No    Sexual activity: Defer       History of Present Illness  The patient is a 74-year-old female who presents with complaints of swelling of the left-sided cheek with tenderness to palpation for about a day and a half.    She reports no fever or green nasal discharge. There is no pain in her teeth, although she has two missing upper teeth that were extracted due to fractures. Last week, she experienced pain radiating down her neck and shoulder.    Her sinus issues have been ongoing since her allergy medication was changed from Allegra-D to Nasacort by Dr. Kevin. She has been using Nasacort for a month, which effectively clears her sinuses, allowing her to breathe through her nose throughout the day. However, she often spits out clear mucus, which she describes as choking. She also uses an albuterol inhaler and Breztri, after which she rinses her mouth. She does not report any sore throat.    She has previously had thrush, but it did not feel like this. She recalls a past incident where she developed thrush on the inside of her cheeks after taking an antibiotic prescribed by Dr. Fagan for a thumb infection. The thrush presented as small white lines and was treated with a swish and spit method.    She is currently on Lamisil for her fingernail.        The  following portions of the patient's history were reviewed and updated as appropriate: allergies, current medications, past family history, past medical history, past social history, past surgical history and problem list.    Review of Systems   Constitutional:  Negative for activity change, appetite change, chills, fatigue, fever, unexpected weight gain and unexpected weight loss.   HENT:  Positive for congestion and facial swelling. Negative for dental problem, ear discharge, ear pain, postnasal drip, rhinorrhea, sinus pressure, sneezing, sore throat, swollen glands and trouble swallowing.    Eyes:  Negative for pain, discharge, redness, itching and visual disturbance.   Respiratory:  Positive for cough. Negative for shortness of breath, wheezing and stridor.    Skin:  Negative for rash.   Allergic/Immunologic: Negative for environmental allergies.   Psychiatric/Behavioral:  Negative for sleep disturbance.        Vitals:    10/15/24 1553   BP: 104/67   Pulse: 79   Resp: 16   Temp: 98.2 °F (36.8 °C)   SpO2: 92%       Objective   Physical Exam  Vitals and nursing note reviewed.   Constitutional:       General: She is not in acute distress.     Appearance: She is well-developed. She is not ill-appearing, toxic-appearing or diaphoretic.   HENT:      Head: Normocephalic and atraumatic.      Right Ear: Tympanic membrane, ear canal and external ear normal. There is no impacted cerumen.      Left Ear: Tympanic membrane, ear canal and external ear normal. There is no impacted cerumen.      Nose: Nose normal. No congestion or rhinorrhea.      Mouth/Throat:      Mouth: Mucous membranes are moist.      Palate: Lesions present.      Pharynx: Oropharynx is clear. Posterior oropharyngeal erythema present. No oropharyngeal exudate.      Comments: +wt plaques at post soft palate and in/s cheeks  Eyes:      General: No scleral icterus.        Right eye: No discharge.         Left eye: No discharge.      Extraocular Movements:  Extraocular movements intact.      Conjunctiva/sclera: Conjunctivae normal.      Pupils: Pupils are equal, round, and reactive to light.   Neck:      Thyroid: No thyromegaly.   Cardiovascular:      Rate and Rhythm: Normal rate and regular rhythm.      Heart sounds: Normal heart sounds. No murmur heard.  Pulmonary:      Effort: Pulmonary effort is normal. No respiratory distress.      Breath sounds: Normal breath sounds. No wheezing or rales.   Musculoskeletal:      Cervical back: Normal range of motion and neck supple.   Lymphadenopathy:      Cervical: No cervical adenopathy.   Skin:     General: Skin is warm and dry.      Coloration: Skin is not pale.      Findings: No erythema or rash.   Neurological:      Mental Status: She is alert and oriented to person, place, and time.      Cranial Nerves: No cranial nerve deficit.   Psychiatric:         Attention and Perception: Attention normal.         Mood and Affect: Mood and affect normal.         Speech: Speech normal.         Behavior: Behavior normal. Behavior is cooperative.         Thought Content: Thought content normal.         Cognition and Memory: Cognition and memory normal.         Judgment: Judgment normal.       Physical Exam       BMI is within normal parameters. No other follow-up for BMI required.      Assessment & Plan   Diagnoses and all orders for this visit:    1. Thrush, oral (Primary)    2. Cheek swelling    3. Panlobular emphysema    Other orders  -     Triamcinolone Acetonide (Nasacort Allergy 24HR) 55 MCG/ACT nasal inhaler; Administer 2 sprays into the nostril(s) as directed by provider Daily.  -     tiotropium bromide-olodaterol (Stiolto Respimat) 2.5-2.5 MCG/ACT aerosol solution inhaler; Inhale 2 puffs Daily.  Dispense: 4 g; Refill: 0  -     nystatin (MYCOSTATIN) 100,000 unit/mL suspension; Swish and swallow 5 mL 4 (Four) Times a Day. Do this until no thrush seen x 2 days  Dispense: 473 mL; Refill: 1      Assessment & Plan  1. Oral  thrush.  The presence of oral thrush is likely due to the use of the Breztri steroid inhaler. A prescription for swish and spit gargles will be provided, to be used four times daily until the condition resolves, followed by a two-day cessation period. A refill will be provided if necessary. The Breztri will be temporarily discontinued and replaced with a non-steroidal inhaler, to be used as two puffs once daily. Should there be no improvement, further tests may be required. If the condition worsens, an antibiotic may be considered.    2. Left cheek swelling.  The swelling of the left cheek with tenderness to palpation has been present for about a day and a half. There is no fever, nasal discharge, or dental pain reported. The patient will start the swish and spit treatment for oral thrush, and if there is no improvement by Friday, an antibiotic may be prescribed to treat a possible infection. Further tests may be considered if the condition does not improve.    3. Sinus congestion.  The patient reports sinus congestion since switching from Allegra-D to Nasacort, as advised by her heart doctor. She is instructed to continue using Nasacort, ensuring proper administration by spraying towards the ears. The patient reports that Nasacort clears her sinuses effectively, allowing her to breathe through her nose all day.    4. Fingernail infection.  The patient is currently taking Lamisil for a fingernail infection. She has about two months' worth of medication left. She is advised to continue the treatment and trim the affected nail as needed.       Results            Patient or patient representative verbalized consent for the use of Ambient Listening during the visit with  Shannan Larsen DO for chart documentation. 10/26/2024  22:00 EDT

## 2024-10-15 NOTE — TELEPHONE ENCOUNTER
Caller: Eva Borrego    Relationship: Self    Best call back number: [phone]     Requested Prescriptions:   Requested Prescriptions     Pending Prescriptions Disp Refills    Breztri Aerosphere 160-9-4.8 MCG/ACT aerosol inhaler 32.1 g 3     Sig: Inhale 2 puffs 2 (Two) Times a Day.        Pharmacy where request should be sent:  [facility] [phone] (FAX)    Last office visit with prescribing clinician: 10/4/2024   Last telemedicine visit with prescribing clinician: Visit date not found   Next office visit with prescribing clinician: 11/19/2024     Additional details provided by patient:     Does the patient have less than a 3 day supply:  [] Yes  [] No    Would you like a call back once the refill request has been completed: [] Yes [] No    If the office needs to give you a call back, can they leave a voicemail: [] Yes [] No    April Bryce Jane Rep   10/15/24 11:34 EDT

## 2024-10-28 DIAGNOSIS — G89.29 CHRONIC MIDLINE LOW BACK PAIN WITHOUT SCIATICA: ICD-10-CM

## 2024-10-28 DIAGNOSIS — M54.50 CHRONIC MIDLINE LOW BACK PAIN WITHOUT SCIATICA: ICD-10-CM

## 2024-10-28 RX ORDER — HYDROCODONE BITARTRATE AND ACETAMINOPHEN 5; 325 MG/1; MG/1
1 TABLET ORAL EVERY 6 HOURS PRN
Qty: 120 TABLET | Refills: 0 | Status: SHIPPED | OUTPATIENT
Start: 2024-10-28

## 2024-10-28 RX ORDER — IPRATROPIUM BROMIDE AND ALBUTEROL SULFATE 2.5; .5 MG/3ML; MG/3ML
SOLUTION RESPIRATORY (INHALATION)
Qty: 360 ML | Refills: 1 | Status: SHIPPED | OUTPATIENT
Start: 2024-10-28

## 2024-10-28 NOTE — TELEPHONE ENCOUNTER
Rx Refill Note  Requested Prescriptions     Pending Prescriptions Disp Refills    ipratropium-albuterol (DUO-NEB) 0.5-2.5 mg/3 ml nebulizer [Pharmacy Med Name: IPRAT-ALBUT 0.5-3 MG/3 ML (30 VLS)] 360 mL 1     Sig: INHALE THREE MILLILITERS VIA NEBULIZATION BY MOUTH FOUR TIMES A DAY AS NEEDED FOR WHEEZING OR FOR SHORTNESS OF BREATH    HYDROcodone-acetaminophen (NORCO) 5-325 MG per tablet 120 tablet 0     Sig: Take 1 tablet by mouth Every 6 (Six) Hours As Needed for Severe Pain.      Last office visit with prescribing clinician: 10/15/2024   Last telemedicine visit with prescribing clinician: Visit date not found   Next office visit with prescribing clinician: 11/19/2024     CBC & Differential (08/01/2024 08:27)   Lipid Panel (08/12/2024 09:22)   Comprehensive Metabolic Panel (08/01/2024 08:27)                     Would you like a call back once the refill request has been completed: [] Yes [] No    If the office needs to give you a call back, can they leave a voicemail: [] Yes [] No    Radha Lawrence CMA  10/28/24, 13:26 EDT  INSPECT in chart

## 2024-10-28 NOTE — TELEPHONE ENCOUNTER
Caller: Eva Borrego    Relationship: Self    Best call back number:[phone]      Requested Prescriptions:   Requested Prescriptions     Pending Prescriptions Disp Refills    ipratropium-albuterol (DUO-NEB) 0.5-2.5 mg/3 ml nebulizer [Pharmacy Med Name: IPRAT-ALBUT 0.5-3 MG/3 ML (30 VLS)] 360 mL 1     Sig: INHALE THREE MILLILITERS VIA NEBULIZATION BY MOUTH FOUR TIMES A DAY AS NEEDED FOR WHEEZING OR FOR SHORTNESS OF BREATH    HYDROcodone-acetaminophen (NORCO) 5-325 MG per tablet 120 tablet 0     Sig: Take 1 tablet by mouth Every 6 (Six) Hours As Needed for Severe Pain.        Pharmacy where request should be sent: [facility] [id number] [place] [id number] [address] [facility] [address] [facility] [address] - [phone] [facility] [phone] FX     Last office visit with prescribing clinician: 10/15/2024   Last telemedicine visit with prescribing clinician: Visit date not found   Next office visit with prescribing clinician: 11/19/2024     Additional details provided by patient: HAS A 1 DAY SUPPLY    Does the patient have less than a 3 day supply:  [x] Yes  [] No    Would you like a call back once the refill request has been completed: [] Yes [x] No    If the office needs to give you a call back, can they leave a voicemail: [] Yes [x] No    Bryce Robles Rep   10/28/24 09:34 EDT

## 2024-11-08 ENCOUNTER — TELEPHONE (OUTPATIENT)
Dept: FAMILY MEDICINE CLINIC | Facility: CLINIC | Age: 74
End: 2024-11-08
Payer: MEDICARE

## 2024-11-08 NOTE — TELEPHONE ENCOUNTER
Patient has adam scheduled for thyroid labs on 11/12/2024.  No active orders and it looks like patient has thyroid labs done in 10/2024.  Does patient still need this appt?

## 2024-11-11 RX ORDER — SERTRALINE HYDROCHLORIDE 25 MG/1
25 TABLET, FILM COATED ORAL NIGHTLY
Qty: 30 TABLET | Refills: 2 | Status: SHIPPED | OUTPATIENT
Start: 2024-11-11

## 2024-11-11 NOTE — TELEPHONE ENCOUNTER
Rx Refill Note  Requested Prescriptions     Pending Prescriptions Disp Refills    sertraline (ZOLOFT) 25 MG tablet [Pharmacy Med Name: SERTRALINE HCL 25 MG TABLET] 30 tablet 2     Sig: TAKE ONE TABLET BY MOUTH ONCE NIGHTLY      Last office visit with prescribing clinician: 10/15/2024   Last telemedicine visit with prescribing clinician: Visit date not found   Next office visit with prescribing clinician: 11/19/2024                         Would you like a call back once the refill request has been completed: [] Yes [] No    If the office needs to give you a call back, can they leave a voicemail: [] Yes [] No    Radha Lawrence, WINSOME  11/11/24, 09:03 EST

## 2024-11-19 ENCOUNTER — OFFICE VISIT (OUTPATIENT)
Dept: FAMILY MEDICINE CLINIC | Facility: CLINIC | Age: 74
End: 2024-11-19
Payer: MEDICARE

## 2024-11-19 VITALS
BODY MASS INDEX: 20.02 KG/M2 | HEART RATE: 83 BPM | OXYGEN SATURATION: 95 % | DIASTOLIC BLOOD PRESSURE: 57 MMHG | HEIGHT: 63 IN | WEIGHT: 113 LBS | SYSTOLIC BLOOD PRESSURE: 94 MMHG | RESPIRATION RATE: 16 BRPM | TEMPERATURE: 98.4 F

## 2024-11-19 DIAGNOSIS — J43.1 PANLOBULAR EMPHYSEMA: Primary | ICD-10-CM

## 2024-11-19 PROCEDURE — 99213 OFFICE O/P EST LOW 20 MIN: CPT | Performed by: FAMILY MEDICINE

## 2024-11-19 PROCEDURE — 1125F AMNT PAIN NOTED PAIN PRSNT: CPT | Performed by: FAMILY MEDICINE

## 2024-11-19 RX ORDER — FOLIC ACID 1 MG/1
1 TABLET ORAL DAILY
Start: 2024-11-19

## 2024-11-19 NOTE — PROGRESS NOTES
Subjective   Eva Borrego is a 74 y.o. female.     Chief Complaint   Patient presents with    Medication Problem     [facility]    [facility]         Current Outpatient Medications:     albuterol sulfate HFA (Ventolin HFA) 108 (90 Base) MCG/ACT inhaler, Inhale 2 puffs Every 6 (Six) Hours As Needed for Wheezing or Shortness of Air., Disp: 18 g, Rfl: 1    Breztri Aerosphere 160-9-4.8 MCG/ACT aerosol inhaler, Inhale 2 puffs 2 (Two) Times a Day., Disp: 32.1 g, Rfl: 3    [facility] IBUPROFEN PO, PRN, Disp: , Rfl:     ibuprofen (ADVIL,MOTRIN) 200 MG tablet, Take 1 tablet by mouth Every 6 (Six) Hours As Needed for Mild Pain., Disp: , Rfl:     ipratropium-albuterol (DUO-NEB) 0.5-2.5 mg/3 ml nebulizer, INHALE THREE MILLILITERS VIA NEBULIZATION BY MOUTH FOUR TIMES A DAY AS NEEDED FOR WHEEZING OR FOR SHORTNESS OF BREATH, Disp: 360 mL, Rfl: 1    levothyroxine (SYNTHROID, LEVOTHROID) 88 MCG tablet, 1 po Qday M-F and 1/2 tab on Sat and Sun, Disp: 90 tablet, Rfl: 2    methotrexate 2.5 MG tablet, 1 tablet. TAKE 6 TABLETS WEEKLY FOR 2 WEEKS THEN 8 TABLETS WEEKLY., Disp: , Rfl:     metoprolol succinate XL (TOPROL-XL) 50 MG 24 hr tablet, TAKE 1 TABLET BY MOUTH DAILY, Disp: 30 tablet, Rfl: 1    multivitamin with minerals tablet tablet, Take 1 tablet by mouth Daily., Disp: , Rfl:     sertraline (ZOLOFT) 25 MG tablet, TAKE ONE TABLET BY MOUTH ONCE NIGHTLY, Disp: 30 tablet, Rfl: 2    simvastatin (ZOCOR) 40 MG tablet, TAKE ONE TABLET BY MOUTH ONCE NIGHTLY, Disp: 90 tablet, Rfl: 3    terbinafine (lamiSIL) 250 MG tablet, Take 1 tablet by mouth Daily., Disp: 60 tablet, Rfl: 0    Triamcinolone Acetonide (Nasacort Allergy 24HR) 55 MCG/ACT nasal inhaler, Administer 2 sprays into the nostril(s) as directed by provider Daily., Disp: , Rfl:     vitamin D3 125 MCG (5000 UT) capsule capsule, Take 1 capsule by mouth Every Other Day. Take 1 tablet on Monday,Wednesday and Friday. (Patient taking differently: Take 1  capsule by mouth Every Other Day.), Disp: 30 capsule, Rfl: 0    denosumab (Prolia) 60 MG/ML solution prefilled syringe syringe, Inject 1 mL under the skin into the appropriate area as directed Every 6 (Six) Months., Disp: 180 mL, Rfl: 1    folic acid (FOLVITE) 1 MG tablet, Take 1 tablet by mouth Daily., Disp: , Rfl:     HYDROcodone-acetaminophen (NORCO) 5-325 MG per tablet, Take 1 tablet by mouth Every 6 (Six) Hours As Needed for Severe Pain., Disp: 120 tablet, Rfl: 0    omeprazole (priLOSEC) 20 MG capsule, TAKE 1 CAPSULE BY MOUTH DAILY, Disp: 90 capsule, Rfl: 1    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    CHOL     COPD     Dermatomyositis     DEXA     2017 =( -0.1/ -3.0); 2019 =(0.7/ -2.9); 2020 = (0.9/ -2.7); 2023= (0.5/ -2.5)    GERD     Hypothyroidism     MAMMO     NEG = 2018/ 2019/ 2020/ 2022/ 2023/ 2024    Osteoarthritis     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    Post-menopausal     Sacroiliac joint pain     Seasonal allergies        Past Surgical History:   Procedure Laterality Date    CHOLECYSTECTOMY      COLONOSCOPY      TA = 2018/ 2021, rech 2024             GSI    EYE SURGERY      B/L Cataract/ Lens Lt    JOINT REPLACEMENT Right     THR---2002/ 2017    MASS EXCISION Left 10/11/2022    Procedure: EXCISION MASS UPPER EXTREMITY, ELBOW;  Surgeon: Jeremy Trinh MD;  Location: [facility] MAIN OR;  Service: Orthopedics;  Laterality: Left;    SPINE SURGERY      Discectomy Lumbar    TONSILLECTOMY         Family History   Problem Relation Age of Onset    Diabetes Mother     Hyperlipidemia Father     Aortic aneurysm Father     Alcohol abuse Sister     COPD Brother     Hypertension Brother     Arthritis Brother     Lung cancer Brother     Kidney disease Brother     Arthritis Maternal Grandmother     Osteoporosis Maternal Grandmother     Stroke Paternal  Grandfather        Social History     Socioeconomic History    Marital status:    Tobacco Use    Smoking status: Former     Current packs/day: 0.00     Average packs/day: 0.5 packs/day for 43.8 years (21.9 ttl pk-yrs)     Types: Cigarettes     Start date:      Quit date: 10/11/2012     Years since quittin.1     Passive exposure: Never    Smokeless tobacco: Never    Tobacco comments:     Vapes 3mL-8   Vaping Use    Vaping status: Every Day    Substances: Flavoring, menthol 12 mg    Devices: Refillable tank   Substance and Sexual Activity    Alcohol use: No    Drug use: No    Sexual activity: Defer       History of Present Illness  73 y/o C female here to disc pt asst for her Breztri    Pt states the med works well for her but she can't afford it and wanting the paperwork filled out to get if for free from the co. If poss        The following portions of the patient's history were reviewed and updated as appropriate: allergies, current medications, past family history, past medical history, past social history, past surgical history and problem list.    Review of Systems   Constitutional:  Negative for activity change, appetite change, unexpected weight gain and unexpected weight loss.   Respiratory:  Negative for cough, shortness of breath and wheezing.        Vitals:    24 1323   BP: 94/57   Pulse: 83   Resp: 16   Temp: 98.4 °F (36.9 °C)   SpO2: 95%       Objective   Physical Exam  Vitals and nursing note reviewed.   Constitutional:       General: She is not in acute distress.     Appearance: She is not ill-appearing or toxic-appearing.   HENT:      Head: Normocephalic and atraumatic.   Pulmonary:      Effort: Pulmonary effort is normal.   Neurological:      Mental Status: She is alert and oriented to person, place, and time.      Cranial Nerves: No cranial nerve deficit.      Gait: Gait normal.   Psychiatric:         Mood and Affect: Mood normal.         Behavior: Behavior normal.          Thought Content: Thought content normal.         Judgment: Judgment normal.       Physical Exam       BMI is within normal parameters. No other follow-up for BMI required.      Assessment & Plan   Diagnoses and all orders for this visit:    1. Panlobular emphysema (Primary)    Other orders  -     folic acid (FOLVITE) 1 MG tablet; Take 1 tablet by mouth Daily.    Paperwork filled out during appt to be faxed  Assessment & Plan       Results            Patient or patient representative verbalized consent for the use of Ambient Listening during the visit with  Shannan Larsen DO for chart documentation. 12/8/2024  10:55 EST

## 2024-11-20 ENCOUNTER — TELEPHONE (OUTPATIENT)
Dept: FAMILY MEDICINE CLINIC | Facility: CLINIC | Age: 74
End: 2024-11-20

## 2024-11-20 RX ORDER — METHOTREXATE 2.5 MG/1
2.5 TABLET ORAL
COMMUNITY
Start: 2024-11-19

## 2024-11-20 NOTE — TELEPHONE ENCOUNTER
Caller: Jose Borrego    Relationship: Self    Best call back number: [phone]     What is the best time to reach you: ANY    Who are you requesting to speak with (clinical staff, provider,  specific staff member): CLINICAL    Do you know the name of the person who called: JOSE    What was the call regarding: PATIENT IS CALLING TO INFORM DR. STERLING THAT THE MEDICATION IS METHOTREXATE 2.5 MG. PATIENT IS TO TAKE 6 TABLETS WEEKLY FOR 2 WEEKS THEN 8 TABLETS WEEKLY.

## 2024-12-06 DIAGNOSIS — M54.50 CHRONIC MIDLINE LOW BACK PAIN WITHOUT SCIATICA: ICD-10-CM

## 2024-12-06 DIAGNOSIS — G89.29 CHRONIC MIDLINE LOW BACK PAIN WITHOUT SCIATICA: ICD-10-CM

## 2024-12-06 RX ORDER — HYDROCODONE BITARTRATE AND ACETAMINOPHEN 5; 325 MG/1; MG/1
1 TABLET ORAL EVERY 6 HOURS PRN
Qty: 120 TABLET | Refills: 0 | Status: SHIPPED | OUTPATIENT
Start: 2024-12-06

## 2024-12-06 NOTE — TELEPHONE ENCOUNTER
INSPECT RAN    Rx Refill Note  Requested Prescriptions     Pending Prescriptions Disp Refills    HYDROcodone-acetaminophen (NORCO) 5-325 MG per tablet 120 tablet 0     Sig: Take 1 tablet by mouth Every 6 (Six) Hours As Needed for Severe Pain.      Last office visit with prescribing clinician: 11/19/2024   Last telemedicine visit with prescribing clinician: Visit date not found   Next office visit with prescribing clinician: 2/21/2025                         Would you like a call back once the refill request has been completed: [] Yes [] No    If the office needs to give you a call back, can they leave a voicemail: [] Yes [] No    Charisma Villegas, RT  12/06/24, 12:10 EST

## 2024-12-06 NOTE — TELEPHONE ENCOUNTER
Caller: Eva Borrego    Relationship: Self    Best call back number: [phone]     Requested Prescriptions:   Requested Prescriptions     Pending Prescriptions Disp Refills    HYDROcodone-acetaminophen (NORCO) 5-325 MG per tablet 120 tablet 0     Sig: Take 1 tablet by mouth Every 6 (Six) Hours As Needed for Severe Pain.        Pharmacy where request should be sent: [facility] [id number] [place] [id number] AT [facility] [address] [place] [address] - [phone] [facility] [phone] FX     Last office visit with prescribing clinician: 11/19/2024   Last telemedicine visit with prescribing clinician: Visit date not found   Next office visit with prescribing clinician: 2/21/2025     Additional details provided by patient: OUT    Does the patient have less than a 3 day supply:  [x] Yes  [] No    Would you like a call back once the refill request has been completed: [] Yes [] No    If the office needs to give you a call back, can they leave a voicemail: [] Yes [] No    Naman Nolan   12/06/24 11:27 EST

## 2024-12-09 RX ORDER — METOPROLOL SUCCINATE 50 MG/1
50 TABLET, EXTENDED RELEASE ORAL DAILY
Qty: 30 TABLET | Refills: 1 | Status: SHIPPED | OUTPATIENT
Start: 2024-12-09

## 2025-01-13 RX ORDER — SERTRALINE HYDROCHLORIDE 25 MG/1
25 TABLET, FILM COATED ORAL NIGHTLY
Qty: 30 TABLET | Refills: 2 | Status: SHIPPED | OUTPATIENT
Start: 2025-01-13

## 2025-01-13 RX ORDER — METOPROLOL SUCCINATE 50 MG/1
50 TABLET, EXTENDED RELEASE ORAL DAILY
Qty: 90 TABLET | Refills: 1 | Status: SHIPPED | OUTPATIENT
Start: 2025-01-13

## 2025-01-27 DIAGNOSIS — G89.29 CHRONIC MIDLINE LOW BACK PAIN WITHOUT SCIATICA: ICD-10-CM

## 2025-01-27 DIAGNOSIS — M54.50 CHRONIC MIDLINE LOW BACK PAIN WITHOUT SCIATICA: ICD-10-CM

## 2025-01-27 NOTE — TELEPHONE ENCOUNTER
Caller: Eva Borrego    Relationship: Self    Best call back number: [phone]     Requested Prescriptions:   Requested Prescriptions     Pending Prescriptions Disp Refills    HYDROcodone-acetaminophen (NORCO) 5-325 MG per tablet 120 tablet 0     Sig: Take 1 tablet by mouth Every 6 (Six) Hours As Needed for Severe Pain.        Pharmacy where request should be sent: [facility] [id number] [place] [address] AT [facility] [address] [place] [address] - [phone] [place] [phone] FX     Last office visit with prescribing clinician: 11/19/2024   Last telemedicine visit with prescribing clinician: Visit date not found   Next office visit with prescribing clinician: 2/21/2025     Additional details provided by patient: PATIENT NEEDS REFILLS ON THIS MEDICATION.     Does the patient have less than a 3 day supply:  [x] Yes  [] No    Would you like a call back once the refill request has been completed: [] Yes [x] No    If the office needs to give you a call back, can they leave a voicemail: [] Yes [x] No    Bryce Apodaca Rep   01/27/25 12:22 EST

## 2025-01-28 RX ORDER — HYDROCODONE BITARTRATE AND ACETAMINOPHEN 5; 325 MG/1; MG/1
1 TABLET ORAL EVERY 6 HOURS PRN
Qty: 120 TABLET | Refills: 0 | Status: SHIPPED | OUTPATIENT
Start: 2025-01-28

## 2025-03-06 ENCOUNTER — OFFICE VISIT (OUTPATIENT)
Dept: FAMILY MEDICINE CLINIC | Facility: CLINIC | Age: 75
End: 2025-03-06
Payer: MEDICARE

## 2025-03-06 ENCOUNTER — PRIOR AUTHORIZATION (OUTPATIENT)
Dept: FAMILY MEDICINE CLINIC | Facility: CLINIC | Age: 75
End: 2025-03-06

## 2025-03-06 VITALS
RESPIRATION RATE: 18 BRPM | HEIGHT: 63 IN | BODY MASS INDEX: 20.2 KG/M2 | TEMPERATURE: 98 F | SYSTOLIC BLOOD PRESSURE: 124 MMHG | OXYGEN SATURATION: 95 % | HEART RATE: 75 BPM | DIASTOLIC BLOOD PRESSURE: 74 MMHG | WEIGHT: 114 LBS

## 2025-03-06 DIAGNOSIS — Z78.0 POSTMENOPAUSAL: ICD-10-CM

## 2025-03-06 DIAGNOSIS — G89.29 CHRONIC MIDLINE LOW BACK PAIN WITHOUT SCIATICA: Primary | ICD-10-CM

## 2025-03-06 DIAGNOSIS — M33.13 DERMATOMYOSITIS: ICD-10-CM

## 2025-03-06 DIAGNOSIS — Z12.31 ENCOUNTER FOR SCREENING MAMMOGRAM FOR BREAST CANCER: ICD-10-CM

## 2025-03-06 DIAGNOSIS — B35.1 ONYCHOMYCOSIS: ICD-10-CM

## 2025-03-06 DIAGNOSIS — J43.1 PANLOBULAR EMPHYSEMA: ICD-10-CM

## 2025-03-06 DIAGNOSIS — R19.7 DIARRHEA, UNSPECIFIED TYPE: ICD-10-CM

## 2025-03-06 DIAGNOSIS — M54.50 CHRONIC MIDLINE LOW BACK PAIN WITHOUT SCIATICA: Primary | ICD-10-CM

## 2025-03-06 RX ORDER — ITRACONAZOLE 100 MG/1
200 CAPSULE ORAL DAILY
Qty: 60 CAPSULE | Refills: 1 | Status: SHIPPED | OUTPATIENT
Start: 2025-03-06 | End: 2025-03-06

## 2025-03-06 RX ORDER — HYDROCODONE BITARTRATE AND ACETAMINOPHEN 5; 325 MG/1; MG/1
1 TABLET ORAL EVERY 6 HOURS PRN
Qty: 120 TABLET | Refills: 0 | Status: SHIPPED | OUTPATIENT
Start: 2025-03-06

## 2025-03-06 RX ORDER — OMEPRAZOLE 20 MG/1
20 CAPSULE, DELAYED RELEASE ORAL DAILY
Qty: 90 CAPSULE | Refills: 1 | Status: SHIPPED | OUTPATIENT
Start: 2025-03-06

## 2025-03-06 RX ORDER — ITRACONAZOLE 100 MG/1
200 CAPSULE ORAL NIGHTLY
Start: 2025-03-06

## 2025-03-06 RX ORDER — METHOTREXATE 2.5 MG/1
20 TABLET ORAL WEEKLY
Status: SHIPPED
Start: 2025-03-06

## 2025-03-06 RX ORDER — ALBUTEROL SULFATE 90 UG/1
2 INHALANT RESPIRATORY (INHALATION) EVERY 6 HOURS PRN
Qty: 18 G | Refills: 1 | Status: SHIPPED | OUTPATIENT
Start: 2025-03-06

## 2025-03-06 NOTE — PROGRESS NOTES
Subjective   Eva Borrego is a 74 y.o. female.     Chief Complaint   Patient presents with    Pain     Back pain     Diarrhea     Watery/ lumpy    Nail Problem         Current Outpatient Medications:     albuterol sulfate HFA (Ventolin HFA) 108 (90 Base) MCG/ACT inhaler, Inhale 2 puffs Every 6 (Six) Hours As Needed for Wheezing or Shortness of Air., Disp: 18 g, Rfl: 1    Breztri Aerosphere 160-9-4.8 MCG/ACT aerosol inhaler, Inhale 2 puffs 2 (Two) Times a Day., Disp: 32.1 g, Rfl: 3    denosumab (Prolia) 60 MG/ML solution prefilled syringe syringe, Inject 1 mL under the skin into the appropriate area as directed Every 6 (Six) Months., Disp: 180 mL, Rfl: 1    folic acid (FOLVITE) 1 MG tablet, Take 1 tablet by mouth Daily., Disp: , Rfl:     HYDROcodone-acetaminophen (NORCO) 5-325 MG per tablet, Take 1 tablet by mouth Every 6 (Six) Hours As Needed for Severe Pain., Disp: 120 tablet, Rfl: 0    ibuprofen (ADVIL,MOTRIN) 200 MG tablet, Take 1 tablet by mouth Every 6 (Six) Hours As Needed for Mild Pain., Disp: , Rfl:     ipratropium-albuterol (DUO-NEB) 0.5-2.5 mg/3 ml nebulizer, INHALE THREE MILLILITERS VIA NEBULIZATION BY MOUTH FOUR TIMES A DAY AS NEEDED FOR WHEEZING OR FOR SHORTNESS OF BREATH, Disp: 360 mL, Rfl: 1    itraconazole (Sporanox) 100 MG capsule, Take 2 capsules by mouth Every Night., Disp: , Rfl:     levothyroxine (SYNTHROID, LEVOTHROID) 88 MCG tablet, 1 po Qday M-F and 1/2 tab on Sat and Sun, Disp: 90 tablet, Rfl: 2    methotrexate 2.5 MG tablet, Take 8 tablets by mouth 1 (One) Time Per Week., Disp: , Rfl:     metoprolol succinate XL (TOPROL-XL) 50 MG 24 hr tablet, Take 1 tablet by mouth Daily., Disp: 90 tablet, Rfl: 1    multivitamin with minerals tablet tablet, Take 1 tablet by mouth Daily., Disp: , Rfl:     omeprazole (priLOSEC) 20 MG capsule, Take 1 capsule by mouth Daily., Disp: 90 capsule, Rfl: 1    sertraline (ZOLOFT) 25 MG tablet, TAKE ONE TABLET BY MOUTH ONCE NIGHTLY, Disp: 30 tablet, Rfl: 2     simvastatin (ZOCOR) 40 MG tablet, TAKE ONE TABLET BY MOUTH ONCE NIGHTLY, Disp: 90 tablet, Rfl: 3    Triamcinolone Acetonide (Nasacort Allergy 24HR) 55 MCG/ACT nasal inhaler, Administer 2 sprays into the nostril(s) as directed by provider Daily., Disp: , Rfl:     vitamin D3 125 MCG (5000 UT) capsule capsule, Take 1 capsule by mouth Every Other Day. Take 1 tablet on Monday,Wednesday and Friday. 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    Post-menopausal     Sacroiliac joint pain     Seasonal allergies        Past Surgical History:   Procedure Laterality Date    CHOLECYSTECTOMY      COLONOSCOPY      TA = 2018/ 2021, rech 2024             GSI    EYE SURGERY      B/L Cataract/ Lens Lt    JOINT REPLACEMENT Right     THR---2002/ 2017    MASS EXCISION Left 10/11/2022    Procedure: EXCISION MASS UPPER EXTREMITY, ELBOW;  Surgeon: Jeremy Trinh MD;  Location: [facility];  Service: Orthopedics;  Laterality: Left;    SPINE SURGERY      Discectomy Lumbar    TONSILLECTOMY         Family History   Problem Relation Age of Onset    Diabetes Mother     Hyperlipidemia Father     Aortic aneurysm Father     Alcohol abuse Sister     COPD Brother     Hypertension Brother     Arthritis Brother     Lung cancer Brother     Kidney disease Brother     Arthritis Maternal Grandmother     Osteoporosis Maternal Grandmother     Stroke Paternal Grandfather        Social History     Socioeconomic History    Marital  status:    Tobacco Use    Smoking status: Former     Current packs/day: 0.00     Average packs/day: 0.5 packs/day for 43.8 years (21.9 ttl pk-yrs)     Types: Cigarettes     Start date:      Quit date: 10/11/2012     Years since quittin.4     Passive exposure: Never    Smokeless tobacco: Never    Tobacco comments:     Vapes 3mL-8   Vaping Use    Vaping status: Every Day    Substances: Flavoring, menthol 12 mg    Devices: Refillable tank   Substance and Sexual Activity    Alcohol use: No    Drug use: No    Sexual activity: Defer       History of Present Illness  The patient is a 74-year-old female here for a 3-month follow-up on chronic back pain.    She has been diagnosed with calcinosis cutis and interstitial lung disease, dermatomyositis by Dr. Anders, a rheumatologist, who prescribed methotrexate. She reports a reduction in the size of the lesions and no new ones have appeared. She also notes a knuckle deformity, which she attributes to rheumatoid arthritis.     She experiences significant fatigue the day after taking methotrexate, often sleeping most of the day. She has been on methotrexate for approximately 2 months and had a follow-up appointment with Dr. Anders at the end of 2025. She underwent a chest x-ray and CT scan at [facility], but the results have not yet been discussed with her. She tolerates methotrexate well, although it induces nausea that typically resolves within a day. She manages the nausea with ginger ale and ginger snaps and avoids milk. She has found relief from Pepto-Bismol. She takes folic acid daily and undergoes blood work at each rheumatology appointment. She is on methotrexate 8 tablets on Friday.    She has reduced her pain medication dosage and has found it effective. She experiences severe back pain during inclement weather, which she manages by resting. She also reports sciatica pain that impairs her mobility, necessitating the use of a walker.    She  has completed a course of terbinafine for a nail infection, but the nail has not yet fully regrown. She reports no trauma to the nail, which initially appeared normal before discoloration occurred. The nail is growing slowly and appears improved, but still abnormal.    She has lost weight, now weighing 114 pounds, despite maintaining a good appetite. She plans to increase her physical activity once the weather improves. She finds it challenging to walk outdoors [place].     She is due for a mammogram and bone density test. She continues to experience diarrhea, with only 2 days per week without loose stools. She has ruled out milk as a cause. She has not yet completed the paperwork for a colonoscopy. She has previously had polyps removed, one of which was precancerous. She takes Imodium as needed, which she finds effective. She reports no bloating or cramping, only loose stools. On days with diarrhea, she has at least 2 episodes of watery, lumpy stools. She has had her gallbladder removed.    She is on Breztri twice a day. She needs a refill for her rescue inhaler. She uses Breztri everyday at 9:00 in the morning and 9:00 at night. She gets Breztri through mail at a reduced price.    She is on omeprazole 20 mg and needs a refill.    MEDICATIONS  methotrexate, folic acid, Breztri, Prolia, ibuprofen, thyroid 88, metoprolol, omeprazole, Zoloft, Pepto-Bismol, Imodium, terbinafine.        The following portions of the patient's history were reviewed and updated as appropriate: allergies, current medications, past family history, past medical history, past social history, past surgical history and problem list.    Review of Systems   Constitutional:  Positive for activity change and unexpected weight loss. Negative for appetite change and unexpected weight gain.   Gastrointestinal:  Positive for diarrhea. Negative for abdominal distention, abdominal pain, constipation, nausea, vomiting and GERD.    Musculoskeletal:  Positive for back pain and gait problem.       Vitals:    03/06/25 1450   BP: 124/74   Pulse: 75   Resp: 18   Temp: 98 °F (36.7 °C)   SpO2: 95%       Objective   Physical Exam  Vitals and nursing note reviewed.   Constitutional:       General: She is not in acute distress.     Appearance: She is well-developed. She is not ill-appearing or toxic-appearing.   HENT:      Head: Normocephalic and atraumatic.   Cardiovascular:      Rate and Rhythm: Normal rate and regular rhythm.      Heart sounds: Normal heart sounds. No murmur heard.  Pulmonary:      Effort: Pulmonary effort is normal. No respiratory distress.      Breath sounds: Normal breath sounds. No wheezing, rhonchi or rales.   Musculoskeletal:        Hands:    Skin:     General: Skin is warm and dry.      Findings: No rash.   Neurological:      Mental Status: She is alert and oriented to person, place, and time. Mental status is at baseline.      Gait: Gait normal.   Psychiatric:         Attention and Perception: Attention and perception normal.         Mood and Affect: Mood and affect normal.         Speech: Speech normal.         Behavior: Behavior normal. Behavior is cooperative.         Thought Content: Thought content normal.         Cognition and Memory: Cognition and memory normal.         Judgment: Judgment normal.       Physical Exam  Vital Signs  Weight = 114#     BMI is within normal parameters. No other follow-up for BMI required  Assessment & Plan   Diagnoses and all orders for this visit:    1. Chronic midline low back pain without sciatica (Primary)  -     HYDROcodone-acetaminophen (NORCO) 5-325 MG per tablet; Take 1 tablet by mouth Every 6 (Six) Hours As Needed for Severe Pain.  Dispense: 120 tablet; Refill: 0    2. Onychomycosis  -     Comprehensive Metabolic Panel; Future    3. Postmenopausal  -     DEXA Bone Density Axial; Future    4. Encounter for screening mammogram for breast cancer  -     Mammo Screening Digital  Tomosynthesis Bilateral With CAD; Future    Other orders  -     methotrexate 2.5 MG tablet; Take 8 tablets by mouth 1 (One) Time Per Week.  -     omeprazole (priLOSEC) 20 MG capsule; Take 1 capsule by mouth Daily.  Dispense: 90 capsule; Refill: 1  -     albuterol sulfate HFA (Ventolin HFA) 108 (90 Base) MCG/ACT inhaler; Inhale 2 puffs Every 6 (Six) Hours As Needed for Wheezing or Shortness of Air.  Dispense: 18 g; Refill: 1  -     Discontinue: itraconazole (Sporanox) 100 MG capsule; Take 2 capsules by mouth Daily.  Dispense: 60 capsule; Refill: 1  -     itraconazole (Sporanox) 100 MG capsule; Take 2 capsules by mouth Every Night.      Assessment & Plan  1. Calcinosis cutis.  She is currently on methotrexate 20 mg weekly, prescribed by her rheumatologist. She reports that the lesions are shrinking, and no new lesions have appeared. She experiences significant nausea the day after taking methotrexate, which lasts for about half a day to a full day. She is advised to take anti-nausea medication such as Pepto-Bismol as needed. A follow-up appointment with her rheumatologist is scheduled, and recent chest x-ray and CT scan results will be reviewed.    2. Chronic back pain.  She reports that her pain medication regimen is effective, especially during flare-ups associated with bad weather. She is advised to continue her current pain management plan, including the use of her walker when necessary.    3. Onychomycosis.  She completed a course of terbinafine, but the nail has not fully recovered. A prescription for itraconazole 200 mg daily for 2 months has been provided, with instructions to take it at bedtime to avoid interaction with omeprazole. A prior authorization will be attempted, and if not covered, GoodRx will be considered.    4. Diarrhea.  She reports ongoing loose stools, occurring at least twice a day on diarrhea days. She has tried dietary modifications without success. She is advised to continue using Imodium  as needed and ensure adequate fluid intake. A colonoscopy is recommended to rule out any serious underlying conditions, given her history of precancerous polyps.    5. Medication management.  Refills for omeprazole and her rescue inhaler have been requested. She is advised to continue her current medication regimen, including Breztri twice a day, Prolia shots twice a year, folic acid daily, thyroid medication (88 mcg daily, half a tab on weekends), metoprolol 50 mg daily, and sertraline 25 mg daily.    6. Health maintenance.  A referral for a mammogram has been made for May 2025 at [facility]. A bone density test is also scheduled, as she is due for her biennial screening.    Follow-up  The patient will follow up in 2 months for lab work.    PROCEDURE  The patient has previously had polyps removed, one of which was precancerous.     Results            Patient or patient representative verbalized consent for the use of Ambient Listening during the visit with  Shannan Larsen DO for chart documentation. 3/23/2025  20:48 EDT

## 2025-03-06 NOTE — TELEPHONE ENCOUNTER
HUB to read    PA approved for Itraconazole 100MG capsules    PA approval was faxed to the patients pharmacy     Outcome  Approved today by [employer] NCPDP 2017  Your request has been approved  Effective Date: 1/1/2025  Authorization Expiration Date: 9/2/2025

## 2025-03-06 NOTE — TELEPHONE ENCOUNTER
HUB to read    PA was sent for Itraconazole 100MG capsules    Waiting on the outcome from insurance.

## 2025-03-26 RX ORDER — IPRATROPIUM BROMIDE AND ALBUTEROL SULFATE 2.5; .5 MG/3ML; MG/3ML
SOLUTION RESPIRATORY (INHALATION)
Qty: 360 ML | Refills: 1 | Status: SHIPPED | OUTPATIENT
Start: 2025-03-26 | End: 2025-03-27 | Stop reason: SDUPTHER

## 2025-03-26 NOTE — TELEPHONE ENCOUNTER
Rx Refill Note  Requested Prescriptions     Pending Prescriptions Disp Refills    ipratropium-albuterol (DUO-NEB) 0.5-2.5 mg/3 ml nebulizer [Pharmacy Med Name: IPRAT-ALBUT 0.5-3 MG/3 ML (30 VLS)] 360 mL 1     Sig: INHALE 1 VIAL VIA NEBULIZER FOUR TIMES A DAY AS NEEDED FOR WHEZING OR FOR SHORTNESS OF BREATH      Last office visit with prescribing clinician: 3/6/2025   Last telemedicine visit with prescribing clinician: Visit date not found   Next office visit with prescribing clinician: 5/8/2025     Lipid Panel (08/12/2024 09:22)   CBC & Differential (08/01/2024 08:27)   Comprehensive Metabolic Panel (08/01/2024 08:27)                     Would you like a call back once the refill request has been completed: [] Yes [] No    If the office needs to give you a call back, can they leave a voicemail: [] Yes [] No    Radha Lawrence, WINSOME  03/26/25, 16:05 EDT

## 2025-03-27 RX ORDER — IPRATROPIUM BROMIDE AND ALBUTEROL SULFATE 2.5; .5 MG/3ML; MG/3ML
3 SOLUTION RESPIRATORY (INHALATION) EVERY 6 HOURS PRN
Qty: 360 ML | Refills: 1 | Status: SHIPPED | OUTPATIENT
Start: 2025-03-27

## 2025-04-28 DIAGNOSIS — G89.29 CHRONIC MIDLINE LOW BACK PAIN WITHOUT SCIATICA: ICD-10-CM

## 2025-04-28 DIAGNOSIS — M54.50 CHRONIC MIDLINE LOW BACK PAIN WITHOUT SCIATICA: ICD-10-CM

## 2025-04-28 RX ORDER — HYDROCODONE BITARTRATE AND ACETAMINOPHEN 5; 325 MG/1; MG/1
1 TABLET ORAL EVERY 6 HOURS PRN
Qty: 120 TABLET | Refills: 0 | Status: SHIPPED | OUTPATIENT
Start: 2025-04-28

## 2025-04-28 NOTE — TELEPHONE ENCOUNTER
Caller: Eva Borrego    Relationship: Self    Best call back number: [phone]    Requested Prescriptions:   Requested Prescriptions     Pending Prescriptions Disp Refills    HYDROcodone-acetaminophen (NORCO) 5-325 MG per tablet 120 tablet 0     Sig: Take 1 tablet by mouth Every 6 (Six) Hours As Needed for Severe Pain.        Pharmacy where request should be sent: [facility] [phone] [place] [id number] AT [facility] [address] [place] [address] - [phone] [place] [phone]      Last office visit with prescribing clinician: 3/6/2025   Last telemedicine visit with prescribing clinician: Visit date not found   Next office visit with prescribing clinician: 5/8/2025     Additional details provided by patient: 2 TABLETS LEFT.     Does the patient have less than a 3 day supply:  [x] Yes  [] No    Would you like a call back once the refill request has been completed: [x] Yes [] No    If the office needs to give you a call back, can they leave a voicemail: [x] Yes [] No    Bryce Trivedi Rep   04/28/25 10:26 EDT

## 2025-05-08 ENCOUNTER — OFFICE VISIT (OUTPATIENT)
Dept: FAMILY MEDICINE CLINIC | Facility: CLINIC | Age: 75
End: 2025-05-08
Payer: MEDICARE

## 2025-05-08 VITALS
HEART RATE: 85 BPM | TEMPERATURE: 98.2 F | WEIGHT: 115.4 LBS | OXYGEN SATURATION: 95 % | SYSTOLIC BLOOD PRESSURE: 119 MMHG | BODY MASS INDEX: 20.45 KG/M2 | HEIGHT: 63 IN | DIASTOLIC BLOOD PRESSURE: 79 MMHG

## 2025-05-08 DIAGNOSIS — R60.0 LOCALIZED EDEMA: ICD-10-CM

## 2025-05-08 DIAGNOSIS — M79.89 LEG SWELLING: ICD-10-CM

## 2025-05-08 DIAGNOSIS — B35.1 ONYCHOMYCOSIS: ICD-10-CM

## 2025-05-08 DIAGNOSIS — Z00.00 MEDICARE ANNUAL WELLNESS VISIT, SUBSEQUENT: Primary | ICD-10-CM

## 2025-05-08 PROCEDURE — 1125F AMNT PAIN NOTED PAIN PRSNT: CPT | Performed by: FAMILY MEDICINE

## 2025-05-08 PROCEDURE — 80053 COMPREHEN METABOLIC PANEL: CPT | Performed by: FAMILY MEDICINE

## 2025-05-08 PROCEDURE — 36415 COLL VENOUS BLD VENIPUNCTURE: CPT | Performed by: FAMILY MEDICINE

## 2025-05-08 PROCEDURE — 1160F RVW MEDS BY RX/DR IN RCRD: CPT | Performed by: FAMILY MEDICINE

## 2025-05-08 PROCEDURE — G0439 PPPS, SUBSEQ VISIT: HCPCS | Performed by: FAMILY MEDICINE

## 2025-05-08 PROCEDURE — 83880 ASSAY OF NATRIURETIC PEPTIDE: CPT | Performed by: FAMILY MEDICINE

## 2025-05-08 PROCEDURE — 1159F MED LIST DOCD IN RCRD: CPT | Performed by: FAMILY MEDICINE

## 2025-05-08 RX ORDER — LEVOTHYROXINE SODIUM 88 UG/1
TABLET ORAL
Qty: 90 TABLET | Refills: 2 | Status: SHIPPED | OUTPATIENT
Start: 2025-05-08

## 2025-05-08 RX ORDER — SERTRALINE HYDROCHLORIDE 25 MG/1
25 TABLET, FILM COATED ORAL NIGHTLY
Qty: 90 TABLET | Refills: 1 | Status: SHIPPED | OUTPATIENT
Start: 2025-05-08

## 2025-05-08 RX ORDER — METOPROLOL SUCCINATE 50 MG/1
50 TABLET, EXTENDED RELEASE ORAL DAILY
Qty: 90 TABLET | Refills: 1 | Status: SHIPPED | OUTPATIENT
Start: 2025-05-08

## 2025-05-08 NOTE — PROGRESS NOTES
The ABCs of the Annual Wellness Visit  Medicare Visit    Subjective     Eva Borrego is a 74 y.o. female who presents for an Annual Medicare Visit.    The following portions of the patient's history were reviewed and   updated as appropriate: allergies, current medications, past family history, past medical history, past social history, past surgical history, and problem list.     Compared to one year ago, the patient feels her physical   health is worse.    Compared to one year ago, the patient feels her mental   health is the same.    Recent Hospitalizations:  She was not admitted to the hospital during the last year.       Current Medical Providers:  Patient Care Team:  Shannan Larsen DO as PCP - General (Family Medicine)  Ramirez Kevin MD as Consulting Physician (Cardiology)  Ramy Mccabe MD as Consulting Physician (Otolaryngology)  Keyur Anders Jr., MD (Rheumatology)    Outpatient Medications Prior to Visit   Medication Sig Dispense Refill    albuterol sulfate HFA (Ventolin HFA) 108 (90 Base) MCG/ACT inhaler Inhale 2 puffs Every 6 (Six) Hours As Needed for Wheezing or Shortness of Air. 18 g 1    Breztri Aerosphere 160-9-4.8 MCG/ACT aerosol inhaler Inhale 2 puffs 2 (Two) Times a Day. 32.1 g 3    denosumab (Prolia) 60 MG/ML solution prefilled syringe syringe Inject 1 mL under the skin into the appropriate area as directed Every 6 (Six) Months. 180 mL 1    folic acid (FOLVITE) 1 MG tablet Take 1 tablet by mouth Daily.      HYDROcodone-acetaminophen (NORCO) 5-325 MG per tablet Take 1 tablet by mouth Every 6 (Six) Hours As Needed for Severe Pain. 120 tablet 0    ibuprofen (ADVIL,MOTRIN) 200 MG tablet Take 1 tablet by mouth Every 6 (Six) Hours As Needed for Mild Pain.      ipratropium-albuterol (DUO-NEB) 0.5-2.5 mg/3 ml nebulizer Take 3 mL by nebulization Every 6 (Six) Hours As Needed for Wheezing or Shortness of Air (cough). 360 mL 1    itraconazole (Sporanox) 100 MG capsule Take 2  capsules by mouth Every Night.      levothyroxine (SYNTHROID, LEVOTHROID) 88 MCG tablet 1 po Qday M-F and 1/2 tab on Sat and Sun 90 tablet 2    methotrexate 2.5 MG tablet Take 8 tablets by mouth 1 (One) Time Per Week.      metoprolol succinate XL (TOPROL-XL) 50 MG 24 hr tablet Take 1 tablet by mouth Daily. 90 tablet 1    multivitamin with minerals tablet tablet Take 1 tablet by mouth Daily.      omeprazole (priLOSEC) 20 MG capsule Take 1 capsule by mouth Daily. 90 capsule 1    sertraline (ZOLOFT) 25 MG tablet TAKE ONE TABLET BY MOUTH ONCE NIGHTLY 30 tablet 2    simvastatin (ZOCOR) 40 MG tablet TAKE ONE TABLET BY MOUTH ONCE NIGHTLY 90 tablet 3    Triamcinolone Acetonide (Nasacort Allergy 24HR) 55 MCG/ACT nasal inhaler Administer 2 sprays into the nostril(s) as directed by provider Daily.      vitamin D3 125 MCG (5000 UT) capsule capsule Take 1 capsule by mouth Every Other Day. Take 1 tablet on Monday,Wednesday and Friday. (Patient taking differently: Take 1 capsule by mouth 3 (Three) Times a Week.) 30 capsule 0     No facility-administered medications prior to visit.       Opioid medication/s are on active medication list.  and I have evaluated her active treatment plan and pain score trends (see table).  There were no vitals filed for this visit.  I have reviewed the chart for potential of high risk medication and harmful drug interactions in the elderly.          Aspirin is not on active medication list.  Aspirin use is not indicated based on review of current medical condition/s. Risk of harm outweighs potential benefits.  .    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  ACP discussion was held with the patient during this visit. Patient has an advance directive (not in EMR), copy requested.       Objective   Vitals:    25 1439   BP: 119/79   BP Location: Left arm   Patient Position: Sitting   Cuff Size: Adult   Pulse: 85   Temp: 98.2 °F (36.8 °C)   TempSrc: Temporal   SpO2: 95%  Comment: Room air   Weight: 52.3 kg (115 lb 6.4 oz)   Height: 160 cm (63\")     Estimated body mass index is 20.44 kg/m² as calculated from the following:    Height as of this encounter: 160 cm (63\").    Weight as of this encounter: 52.3 kg (115 lb 6.4 oz).    BMI is within normal parameters. No other follow-up for BMI required.      Does the patient have evidence of cognitive impairment?   No         Procedures       HEALTH RISK ASSESSMENT    Smoking Status:  Social History     Tobacco Use   Smoking Status Former    Current packs/day: 0.00    Average packs/day: 0.5 packs/day for 43.8 years (21.9 ttl pk-yrs)    Types: Cigarettes    Start date: 1969    Quit date: 10/11/2012    Years since quittin.5    Passive exposure: Never   Smokeless Tobacco Never   Tobacco Comments    Vapes 3mL-8     Alcohol Consumption:  Social History     Substance and Sexual Activity   Alcohol Use No       Fall Risk Screen:    STEADI Fall Risk Assessment was completed, and patient is at MODERATE risk for falls. Assessment completed on:2025    Depression Screen:       2025     2:00 PM   PHQ-2/PHQ-9 Depression Screening   Little interest or pleasure in doing things Not at all   Feeling down, depressed, or hopeless Several days   How difficult have these problems made it for you to do your work, take care of things at home, or get along with other people? Not difficult at " all       Health Habits and Functional and Cognitive Screenin/7/2025     5:44 PM   Functional & Cognitive Status   Do you have difficulty preparing food and eating? No   Do you have difficulty bathing yourself, getting dressed or grooming yourself? No   Do you have difficulty using the toilet? No   Do you have difficulty moving around from place to place? Yes   Do you have trouble with steps or getting out of a bed or a chair? Yes   Current Diet Well Balanced Diet   Dental Exam Up to date   Eye Exam Up to date   Exercise (times per week) 1 times per week   Current Exercises Include No Regular Exercise;House Cleaning   Do you need help using the phone?  No   Are you deaf or do you have serious difficulty hearing?  Yes   Do you need help to go to places out of walking distance? Yes   Do you need help shopping? Yes   Do you need help preparing meals?  No   Do you need help with housework?  Yes   Do you need help with laundry? No   Do you need help taking your medications? No   Do you need help managing money? No   Do you ever drive or ride in a car without wearing a seat belt? No   Have you felt unusual stress, anger or loneliness in the last month? No   Who do you live with? Child   If you need help, do you have trouble finding someone available to you? No   Have you been bothered in the last four weeks by sexual problems? No   Do you have difficulty concentrating, remembering or making decisions? Yes       Visual Acuity:    No results found.    Age-appropriate Screening Schedule:  Refer to the list below for future screening recommendations based on patient's age, sex and/or medical conditions. Orders for these recommended tests are listed in the plan section. The patient has been provided with a written plan.    Health Maintenance   Topic Date Due    TDAP/TD VACCINES (2 - Tdap) 2021    LUNG CANCER SCREENING  2021    ANNUAL WELLNESS VISIT  2024    COLORECTAL CANCER SCREENING  10/12/2024     "COVID-19 Vaccine (5 - 2024-25 season) 04/12/2025    DXA SCAN  05/09/2025    INFLUENZA VACCINE  07/01/2025    LIPID PANEL  08/12/2025    MAMMOGRAM  05/13/2026    HEPATITIS C SCREENING  Completed    Pneumococcal Vaccine 50+  Completed    ZOSTER VACCINE  Completed        CMS Preventative Services Quick Reference  Risk Factors Identified During Encounter    Hearing Problem:  has aids  Immunizations Discussed/Encouraged: Influenza, Prevnar 20 (Pneumococcal 20-valent conjugate), Shingrix, COVID19, and RSV (Respiratory Syncytial Virus)  Dental Screening Recommended  Vision Screening Recommended  The above risks/problems have been discussed with the patient.  Pertinent information has been shared with the patient in the After Visit Summary.    Follow Up:   Initial Medicare Visit in one year    An After Visit Summary and PPPS were made available to the patient.      Additional E&M Note during same encounter follows:  Patient has multiple medical problems which are significant and separately identifiable that require additional work above and beyond the Medicare Wellness Visit.      Chief Complaint  Medicare Wellness-subsequent    Subjective        History of Present Illness       Eav Borrego is also being seen today for LE swelling and SOB    Review of Systems   Respiratory:  Positive for shortness of breath. Negative for cough and wheezing.    Cardiovascular:  Positive for leg swelling.       Objective   Vital Signs:  /79 (BP Location: Left arm, Patient Position: Sitting, Cuff Size: Adult)   Pulse 85   Temp 98.2 °F (36.8 °C) (Temporal)   Ht 160 cm (63\")   Wt 52.3 kg (115 lb 6.4 oz)   SpO2 95% Comment: Room air  BMI 20.44 kg/m²     Physical Exam  Vitals and nursing note reviewed.   Constitutional:       General: She is not in acute distress.     Appearance: She is not ill-appearing or toxic-appearing.   HENT:      Head: Normocephalic and atraumatic.   Pulmonary:      Effort: Pulmonary effort is normal. "   Musculoskeletal:      Right lower leg: Swelling present. 2+ Edema present.      Left lower leg: Swelling present. 2+ Edema present.   Neurological:      Mental Status: She is alert and oriented to person, place, and time.      Cranial Nerves: No cranial nerve deficit.   Psychiatric:         Mood and Affect: Mood normal.         Behavior: Behavior normal.         Thought Content: Thought content normal.         Judgment: Judgment normal.        Physical Exam          Assessment and Plan   There are no diagnoses linked to this encounter.  Assessment & Plan       Results            Follow Up   No follow-ups on file.  Patient was given instructions and counseling regarding her condition or for health maintenance advice. Please see specific information pulled into the AVS if appropriate.   Patient or patient representative verbalized consent for the use of Ambient Listening during the visit with  Shannan Larsen DO for chart documentation. 5/26/2025  13:34 EDT

## 2025-05-08 NOTE — PROGRESS NOTES
Venipuncture performed in R ARM by RT Jeannie  with good hemostasis. Patient tolerated well. 05/08/25 Charisma Villegas, RT

## 2025-05-09 LAB
ALBUMIN SERPL-MCNC: 4.3 G/DL (ref 3.5–5.2)
ALBUMIN/GLOB SERPL: 1.4 G/DL
ALP SERPL-CCNC: 76 U/L (ref 39–117)
ALT SERPL W P-5'-P-CCNC: 17 U/L (ref 1–33)
ANION GAP SERPL CALCULATED.3IONS-SCNC: 12 MMOL/L (ref 5–15)
AST SERPL-CCNC: 26 U/L (ref 1–32)
BILIRUB SERPL-MCNC: 0.3 MG/DL (ref 0–1.2)
BUN SERPL-MCNC: 14 MG/DL (ref 8–23)
BUN/CREAT SERPL: 23.3 (ref 7–25)
CALCIUM SPEC-SCNC: 10.1 MG/DL (ref 8.6–10.5)
CHLORIDE SERPL-SCNC: 98 MMOL/L (ref 98–107)
CO2 SERPL-SCNC: 29 MMOL/L (ref 22–29)
CREAT SERPL-MCNC: 0.6 MG/DL (ref 0.57–1)
EGFRCR SERPLBLD CKD-EPI 2021: 94.3 ML/MIN/1.73
GLOBULIN UR ELPH-MCNC: 3.1 GM/DL
GLUCOSE SERPL-MCNC: 103 MG/DL (ref 65–99)
NT-PROBNP SERPL-MCNC: 192 PG/ML (ref 0–900)
POTASSIUM SERPL-SCNC: 4.6 MMOL/L (ref 3.5–5.2)
PROT SERPL-MCNC: 7.4 G/DL (ref 6–8.5)
SODIUM SERPL-SCNC: 139 MMOL/L (ref 136–145)

## 2025-05-27 RX ORDER — BUDESONIDE, GLYCOPYRROLATE, AND FORMOTEROL FUMARATE 160; 9; 4.8 UG/1; UG/1; UG/1
2 AEROSOL, METERED RESPIRATORY (INHALATION) 2 TIMES DAILY
Qty: 32.1 G | Refills: 3 | Status: SHIPPED | OUTPATIENT
Start: 2025-05-27

## 2025-05-28 ENCOUNTER — HOSPITAL ENCOUNTER (OUTPATIENT)
Dept: MAMMOGRAPHY | Facility: HOSPITAL | Age: 75
Discharge: HOME | End: 2025-05-28
Payer: MEDICARE

## 2025-05-28 ENCOUNTER — HOSPITAL ENCOUNTER (OUTPATIENT)
Dept: BONE DENSITY | Facility: HOSPITAL | Age: 75
Discharge: HOME | End: 2025-05-28
Payer: MEDICARE

## 2025-05-28 DIAGNOSIS — Z12.31 ENCOUNTER FOR SCREENING MAMMOGRAM FOR BREAST CANCER: ICD-10-CM

## 2025-05-28 DIAGNOSIS — Z78.0 POSTMENOPAUSAL: ICD-10-CM

## 2025-05-28 PROCEDURE — 77063 BREAST TOMOSYNTHESIS BI: CPT

## 2025-05-28 PROCEDURE — 77080 DXA BONE DENSITY AXIAL: CPT

## 2025-05-28 PROCEDURE — 77067 SCR MAMMO BI INCL CAD: CPT

## 2025-06-06 ENCOUNTER — OFFICE VISIT (OUTPATIENT)
Dept: FAMILY MEDICINE CLINIC | Facility: CLINIC | Age: 75
End: 2025-06-06
Payer: MEDICARE

## 2025-06-06 ENCOUNTER — TRANSCRIBE ORDERS (OUTPATIENT)
Dept: ADMINISTRATIVE | Facility: HOSPITAL | Age: 75
End: 2025-06-06
Payer: MEDICARE

## 2025-06-06 VITALS
RESPIRATION RATE: 18 BRPM | OXYGEN SATURATION: 96 % | DIASTOLIC BLOOD PRESSURE: 74 MMHG | HEART RATE: 82 BPM | WEIGHT: 111 LBS | SYSTOLIC BLOOD PRESSURE: 125 MMHG | HEIGHT: 63 IN | TEMPERATURE: 98.2 F | BODY MASS INDEX: 19.67 KG/M2

## 2025-06-06 DIAGNOSIS — M81.0 OSTEOPOROSIS WITHOUT CURRENT PATHOLOGICAL FRACTURE, UNSPECIFIED OSTEOPOROSIS TYPE: ICD-10-CM

## 2025-06-06 DIAGNOSIS — M54.50 CHRONIC MIDLINE LOW BACK PAIN WITHOUT SCIATICA: Primary | ICD-10-CM

## 2025-06-06 DIAGNOSIS — K52.1 DIARRHEA DUE TO DRUG: ICD-10-CM

## 2025-06-06 DIAGNOSIS — B35.1 ONYCHOMYCOSIS: ICD-10-CM

## 2025-06-06 DIAGNOSIS — G89.29 CHRONIC MIDLINE LOW BACK PAIN WITHOUT SCIATICA: Primary | ICD-10-CM

## 2025-06-06 DIAGNOSIS — M81.0 OSTEOPOROSIS, UNSPECIFIED OSTEOPOROSIS TYPE, UNSPECIFIED PATHOLOGICAL FRACTURE PRESENCE: Primary | ICD-10-CM

## 2025-06-06 PROCEDURE — 1125F AMNT PAIN NOTED PAIN PRSNT: CPT | Performed by: FAMILY MEDICINE

## 2025-06-06 PROCEDURE — 99214 OFFICE O/P EST MOD 30 MIN: CPT | Performed by: FAMILY MEDICINE

## 2025-06-06 RX ORDER — ZOLEDRONIC ACID 0.05 MG/ML
5 INJECTION, SOLUTION INTRAVENOUS ONCE
Qty: 100 ML | Refills: 0 | Status: SHIPPED | OUTPATIENT
Start: 2025-06-06 | End: 2025-06-06

## 2025-06-06 RX ORDER — HYDROCODONE BITARTRATE AND ACETAMINOPHEN 5; 325 MG/1; MG/1
1 TABLET ORAL EVERY 6 HOURS PRN
Qty: 120 TABLET | Refills: 0 | Status: SHIPPED | OUTPATIENT
Start: 2025-06-06

## 2025-06-06 RX ORDER — ALBUTEROL SULFATE 90 UG/1
2 INHALANT RESPIRATORY (INHALATION) EVERY 6 HOURS PRN
Qty: 18 G | Refills: 3 | Status: SHIPPED | OUTPATIENT
Start: 2025-06-06

## 2025-06-06 NOTE — PROGRESS NOTES
Subjective   Eva Borrego is a 75 y.o. female.     Chief Complaint   Patient presents with    Chronic midline low back pain without sciatica         Current Outpatient Medications:     albuterol sulfate HFA (Ventolin HFA) 108 (90 Base) MCG/ACT inhaler, Inhale 2 puffs Every 6 (Six) Hours As Needed for Wheezing or Shortness of Air., Disp: 18 g, Rfl: 3    Breztri Aerosphere 160-9-4.8 MCG/ACT aerosol inhaler, Inhale 2 puffs 2 (Two) Times a Day., Disp: 32.1 g, Rfl: 3    denosumab (Prolia) 60 MG/ML solution prefilled syringe syringe, Inject 1 mL under the skin into the appropriate area as directed Every 6 (Six) Months., Disp: 180 mL, Rfl: 1    folic acid (FOLVITE) 1 MG tablet, Take 1 tablet by mouth Daily., Disp: , Rfl:     HYDROcodone-acetaminophen (NORCO) 5-325 MG per tablet, Take 1 tablet by mouth Every 6 (Six) Hours As Needed for Severe Pain., Disp: 120 tablet, Rfl: 0    ibuprofen (ADVIL,MOTRIN) 200 MG tablet, Take 1 tablet by mouth Every 6 (Six) Hours As Needed for Mild Pain., Disp: , Rfl:     ipratropium-albuterol (DUO-NEB) 0.5-2.5 mg/3 ml nebulizer, Take 3 mL by nebulization Every 6 (Six) Hours As Needed for Wheezing or Shortness of Air (cough)., Disp: 360 mL, Rfl: 1    levothyroxine (SYNTHROID, LEVOTHROID) 88 MCG tablet, 1 po Qday M-F and 1/2 tab on Sat and Sun, Disp: 90 tablet, Rfl: 2    methotrexate 2.5 MG tablet, Take 8 tablets by mouth 1 (One) Time Per Week., Disp: , Rfl:     metoprolol succinate XL (TOPROL-XL) 50 MG 24 hr tablet, Take 1 tablet by mouth Daily., Disp: 90 tablet, Rfl: 1    multivitamin with minerals tablet tablet, Take 1 tablet by mouth Daily., Disp: , Rfl:     omeprazole (priLOSEC) 20 MG capsule, Take 1 capsule by mouth Daily., Disp: 90 capsule, Rfl: 1    sertraline (ZOLOFT) 25 MG tablet, Take 1 tablet by mouth Every Night., Disp: 90 tablet, Rfl: 1    simvastatin (ZOCOR) 40 MG tablet, TAKE ONE TABLET BY MOUTH ONCE NIGHTLY, Disp: 90 tablet, Rfl: 3    Triamcinolone Acetonide (Nasacort  Allergy 24HR) 55 MCG/ACT nasal inhaler, Administer 2 sprays into the nostril(s) as directed by provider Daily., Disp: , Rfl:     vitamin D3 125 MCG (5000 UT) capsule capsule, Take 1 capsule by mouth Every Other Day. Take 1 tablet on Monday,Wednesday and Friday. (Patient taking differently: Take 1 capsule by mouth 3 (Three) Times a Week.), Disp: 30 capsule, Rfl: 0    zoledronic acid (RECLAST) 5 MG/100ML solution infusion, Infuse 100 mL into a venous catheter 1 (One) Time for 1 dose., Disp: 100 mL, Rfl: 0    Past Medical History:   Diagnosis Date    Allergic     Arthritis     Asthma     Atypical mole     removed as a child and recieved radiation, on upper lip    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    Cataract     CHOL     Cholelithiasis     COPD     Dermatomyositis     DEXA     2017 =( -0.1/ -3.0); 2019 =(0.7/ -2.9); 2020 = (0.9/ -2.7); 2023= (0.5/ -2.5); 2025=(-0.4/-2.9)    GERD     HL (hearing loss)     Hypothyroidism     Low back pain     MAMMO     NEG = 2018/ 2019/ 2020/ 2022/ 2023/ 2024/ 2025    Osteoarthritis     Osteopenia     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    Post-menopausal     Sacroiliac joint pain     Seasonal allergies     Tremor     Visual impairment        Past Surgical History:   Procedure Laterality Date    CHOLECYSTECTOMY      COLONOSCOPY      TA = 2018/ 2021, rech 2024             GSI    EYE SURGERY      B/L Cataract/ Lens Lt    JOINT REPLACEMENT Right     THR---2002/ 2017    MASS EXCISION Left 10/11/2022    Procedure: EXCISION MASS UPPER EXTREMITY, ELBOW;  Surgeon: Jeremy Trinh MD;  Location: [facility] MAIN OR;  Service: Orthopedics;  Laterality: Left;    SPINE SURGERY      Discectomy Lumbar    TONSILLECTOMY         Family History   Problem Relation Age of Onset    Diabetes Mother     Mental illness Mother     Hyperlipidemia Father     Aortic aneurysm Father     Alcohol abuse Sister     Drug abuse Sister     Early death Sister     Learning  disabilities Brother     Kidney disease Brother     Arthritis Brother     Cancer Brother     COPD Brother     Hypertension Brother     Lung cancer Brother     Brain cancer Brother     Arthritis Maternal Grandmother     Osteoporosis Maternal Grandmother     Stroke Paternal Grandfather        Social History     Socioeconomic History    Marital status:    Tobacco Use    Smoking status: Former     Current packs/day: 0.00     Average packs/day: 0.5 packs/day for 43.8 years (21.9 ttl pk-yrs)     Types: Cigarettes     Start date: 1969     Quit date: 10/11/2012     Years since quittin.6     Passive exposure: Never    Smokeless tobacco: Never    Tobacco comments:     Vapes 3mL-8   Vaping Use    Vaping status: Every Day    Substances: Flavoring, menthol 12 mg    Devices: RefWingzble tank   Substance and Sexual Activity    Alcohol use: No    Drug use: No    Sexual activity: Not Currently       History of Present Illness  The patient is a 75-year-old female who presents for follow-up on chronic midline low back pain, osteoporosis, diarrhea, and medication management.    She has been experiencing persistent diarrhea, which she suspects may be a side effect of methotrexate. She reports no previous issues with diarrhea prior to starting methotrexate. Her weight has decreased to 111 pounds, down from 114 pounds in 3/2025. She attributes this weight loss to the diarrhea and a temporary inability to chew foods due to a lost dental crown, which limited her diet to soup. She has since resumed normal eating.   She has not yet scheduled her colonoscopy. She experiences watery stools 4 to 5 times per week, with normal bowel movements occurring 2 to 3 times per week. She has attempted to identify dietary triggers but has not found any. She does not have a gallbladder. She has been making efforts to maintain adequate hydration. She takes Imodium as needed for diarrhea, which she finds effective.    She has completed a course  of Sporanox for a yeast infection on her right thumbnail, which was previously cultured. The infection is currently asymptomatic, and she plans to file it down at home.    She has ordered a rescue inhaler today and uses approximately one per month.    She has undergone a mammogram and bone density test, with the latter revealing osteoporosis. She has not received her Prolia injection this spring. She has no history of fractures. She is currently on vitamin D3 three times a week.    She is under the care of Dr. Anders, a rheumatologist, who manages her methotrexate treatment. She is uncertain about her next appointment with him. She reports that her arm condition has improved with the medication, although it remains slightly inflamed. She has not experienced any improvement in her back pain with methotrexate. She finds relief from her pain medication, which does not cause constipation.        The following portions of the patient's history were reviewed and updated as appropriate: allergies, current medications, past family history, past medical history, past social history, past surgical history and problem list.    Review of Systems   Constitutional:  Positive for appetite change and unexpected weight loss. Negative for activity change and unexpected weight gain.   Gastrointestinal:  Positive for diarrhea. Negative for abdominal distention, abdominal pain and constipation.   Musculoskeletal:  Positive for back pain.   Psychiatric/Behavioral:  Positive for stress.        Vitals:    06/06/25 1404   BP: 125/74   Pulse: 82   Resp: 18   Temp: 98.2 °F (36.8 °C)   SpO2: 96%       Objective   Physical Exam  Vitals and nursing note reviewed.   Constitutional:       General: She is not in acute distress.     Appearance: She is underweight. She is not ill-appearing or toxic-appearing.   Cardiovascular:      Rate and Rhythm: Normal rate and regular rhythm.      Heart sounds: No murmur heard.  Pulmonary:      Effort: Pulmonary  effort is normal.      Breath sounds: Decreased breath sounds present. No wheezing, rhonchi or rales.   Musculoskeletal:      Right forearm: Deformity (+subcut hard plaques on dorsal Rt UE w/ some protruding calcified appearing nodules along the length from wrist to elbow) and tenderness present. No swelling or edema.        Hands:       Right lower leg: No edema.      Left lower leg: No edema.      Comments: +discolored thickened Rt thumbnail trimmed the loose distal half in the office w/o difficulty   Neurological:      Mental Status: She is alert and oriented to person, place, and time. Mental status is at baseline.      Gait: Gait normal.   Psychiatric:         Attention and Perception: Attention and perception normal.         Mood and Affect: Mood and affect normal.         Speech: Speech normal.         Behavior: Behavior normal. Behavior is cooperative.         Thought Content: Thought content normal.         Cognition and Memory: Cognition and memory normal.         Judgment: Judgment normal.       Physical Exam    BMI is within normal parameters. No other follow-up for BMI required.      Assessment & Plan   Diagnoses and all orders for this visit:    1. Chronic midline low back pain without sciatica (Primary)  -     HYDROcodone-acetaminophen (NORCO) 5-325 MG per tablet; Take 1 tablet by mouth Every 6 (Six) Hours As Needed for Severe Pain.  Dispense: 120 tablet; Refill: 0    2. Onychomycosis    3. Diarrhea due to drug    4. Osteoporosis without current pathological fracture, unspecified osteoporosis type    Other orders  -     albuterol sulfate HFA (Ventolin HFA) 108 (90 Base) MCG/ACT inhaler; Inhale 2 puffs Every 6 (Six) Hours As Needed for Wheezing or Shortness of Air.  Dispense: 18 g; Refill: 3  -     zoledronic acid (RECLAST) 5 MG/100ML solution infusion; Infuse 100 mL into a venous catheter 1 (One) Time for 1 dose.  Dispense: 100 mL; Refill: 0      Assessment & Plan  1. Osteoporosis.  - The patient's  bone density scan indicates a progression in her osteoporosis, with a current T-score of -2.9 and Z-score of -2.2.  - She has been taking vitamin D3 but has not received her Prolia injection this spring.  - Discussed Evenity and Reclast infusion. A Reclast infusion will be ordered for her, to be administered annually at the infusion center.  - No fractures reported.     2. Chronic midline low back pain.  - Reports no improvement in her back pain with methotrexate.  - Physical exam findings and test results not discussed.  - Pain medication regimen reported as effective.  - Prescription for her pain medication will be sent to pharmacy.    3. Diarrhea.  - Reports experiencing watery stools 4-5 times a week, possibly related to methotrexate.  - No recent CAT scan or colonoscopy; colonoscopy to be scheduled.  - Will consult with Dr. Anders regarding the potential link between methotrexate and her diarrhea. If confirmed, may need to discontinue methotrexate temporarily to see if symptoms improve.  - Will continue using Imodium as needed for symptom relief.    4. Medication management.  - Completed course of Sporanox for a yeast infection on her right hand, which was previously cultured.  - Infection is currently asymptomatic, plans to file it down at home.  - Albuterol prescription will be renewed and sent to pharmacy.  - Methotrexate management to be discussed with Dr. Anders due to potential side effects.     Results  Imaging   - Bone density test: Osteoporosis with a T-score of -2.9 and Z-score of -2.2.          Patient or patient representative verbalized consent for the use of Ambient Listening during the visit with  Shannan Larsen DO for chart documentation. 6/7/2025  09:16 EDT

## 2025-06-11 ENCOUNTER — TELEPHONE (OUTPATIENT)
Dept: FAMILY MEDICINE CLINIC | Facility: CLINIC | Age: 75
End: 2025-06-11
Payer: MEDICARE

## 2025-06-11 DIAGNOSIS — M81.0 SENILE OSTEOPOROSIS: Primary | ICD-10-CM

## 2025-06-11 NOTE — TELEPHONE ENCOUNTER
Patient's son called stating patient is scheduled for Reclast infusion on 6/24/2025 but the cancer center said patient's labs are outdated and she will need to have updated labs prior to infusion.  They were hoping to get labs drawn this week if you are ok with ordering them.

## 2025-06-12 ENCOUNTER — CLINICAL SUPPORT (OUTPATIENT)
Dept: FAMILY MEDICINE CLINIC | Facility: CLINIC | Age: 75
End: 2025-06-12
Payer: MEDICARE

## 2025-06-12 DIAGNOSIS — M81.0 SENILE OSTEOPOROSIS: ICD-10-CM

## 2025-06-12 PROCEDURE — 80053 COMPREHEN METABOLIC PANEL: CPT | Performed by: FAMILY MEDICINE

## 2025-06-12 PROCEDURE — 84100 ASSAY OF PHOSPHORUS: CPT | Performed by: FAMILY MEDICINE

## 2025-06-12 PROCEDURE — 36415 COLL VENOUS BLD VENIPUNCTURE: CPT | Performed by: FAMILY MEDICINE

## 2025-06-12 PROCEDURE — 83735 ASSAY OF MAGNESIUM: CPT | Performed by: FAMILY MEDICINE

## 2025-06-12 RX ORDER — SODIUM CHLORIDE 9 MG/ML
20 INJECTION, SOLUTION INTRAVENOUS ONCE
OUTPATIENT
Start: 2025-06-13

## 2025-06-12 RX ORDER — ZOLEDRONIC ACID 0.05 MG/ML
5 INJECTION, SOLUTION INTRAVENOUS ONCE
OUTPATIENT
Start: 2025-06-13

## 2025-06-12 NOTE — PROGRESS NOTES
Venipuncture Blood Specimen Collection  Venipuncture performed in RA by Navya Pham MA with good hemostasis. Patient tolerated the procedure well without complications.   06/12/25   Navya Pham MA

## 2025-06-13 LAB
ALBUMIN SERPL-MCNC: 3.8 G/DL (ref 3.5–5.2)
ALBUMIN/GLOB SERPL: 1.3 G/DL
ALP SERPL-CCNC: 63 U/L (ref 39–117)
ALT SERPL W P-5'-P-CCNC: 13 U/L (ref 1–33)
ANION GAP SERPL CALCULATED.3IONS-SCNC: 10.7 MMOL/L (ref 5–15)
AST SERPL-CCNC: 20 U/L (ref 1–32)
BILIRUB SERPL-MCNC: 0.4 MG/DL (ref 0–1.2)
BUN SERPL-MCNC: 13 MG/DL (ref 8–23)
BUN/CREAT SERPL: 18.3 (ref 7–25)
CALCIUM SPEC-SCNC: 9.5 MG/DL (ref 8.6–10.5)
CHLORIDE SERPL-SCNC: 104 MMOL/L (ref 98–107)
CO2 SERPL-SCNC: 28.3 MMOL/L (ref 22–29)
CREAT SERPL-MCNC: 0.71 MG/DL (ref 0.57–1)
EGFRCR SERPLBLD CKD-EPI 2021: 88.8 ML/MIN/1.73
GLOBULIN UR ELPH-MCNC: 3 GM/DL
GLUCOSE SERPL-MCNC: 120 MG/DL (ref 65–99)
MAGNESIUM SERPL-MCNC: 1.3 MG/DL (ref 1.6–2.4)
PHOSPHATE SERPL-MCNC: 3.5 MG/DL (ref 2.5–4.5)
POTASSIUM SERPL-SCNC: 3.9 MMOL/L (ref 3.5–5.2)
PROT SERPL-MCNC: 6.8 G/DL (ref 6–8.5)
SODIUM SERPL-SCNC: 143 MMOL/L (ref 136–145)

## 2025-06-24 ENCOUNTER — HOSPITAL ENCOUNTER (OUTPATIENT)
Dept: ONCOLOGY | Facility: HOSPITAL | Age: 75
Discharge: HOME | End: 2025-06-24
Admitting: FAMILY MEDICINE
Payer: MEDICARE

## 2025-06-24 VITALS
OXYGEN SATURATION: 95 % | DIASTOLIC BLOOD PRESSURE: 76 MMHG | WEIGHT: 113 LBS | BODY MASS INDEX: 20.02 KG/M2 | TEMPERATURE: 98.2 F | SYSTOLIC BLOOD PRESSURE: 117 MMHG | RESPIRATION RATE: 16 BRPM | HEART RATE: 77 BPM

## 2025-06-24 DIAGNOSIS — M81.0 SENILE OSTEOPOROSIS: Primary | ICD-10-CM

## 2025-06-24 PROCEDURE — 96374 THER/PROPH/DIAG INJ IV PUSH: CPT

## 2025-06-24 PROCEDURE — 96365 THER/PROPH/DIAG IV INF INIT: CPT

## 2025-06-24 PROCEDURE — 25010000002 ZOLEDRONIC ACID 5 MG/100ML SOLUTION: Performed by: FAMILY MEDICINE

## 2025-06-24 RX ORDER — ZOLEDRONIC ACID 0.05 MG/ML
5 INJECTION, SOLUTION INTRAVENOUS ONCE
Status: COMPLETED | OUTPATIENT
Start: 2025-06-24 | End: 2025-06-24

## 2025-06-24 RX ORDER — SODIUM CHLORIDE 9 MG/ML
20 INJECTION, SOLUTION INTRAVENOUS ONCE
Status: CANCELLED | OUTPATIENT
Start: 2025-06-24

## 2025-06-24 RX ORDER — ZOLEDRONIC ACID 0.05 MG/ML
5 INJECTION, SOLUTION INTRAVENOUS ONCE
Status: CANCELLED | OUTPATIENT
Start: 2025-06-24

## 2025-06-24 RX ORDER — SODIUM CHLORIDE 9 MG/ML
20 INJECTION, SOLUTION INTRAVENOUS ONCE
Status: DISCONTINUED | OUTPATIENT
Start: 2025-06-24 | End: 2025-06-25 | Stop reason: HOSPADM

## 2025-06-24 RX ADMIN — ZOLEDRONIC ACID 5 MG: 0.05 INJECTION, SOLUTION INTRAVENOUS at 14:09

## 2025-06-24 NOTE — PROGRESS NOTES
Pt here for Reclast per Dr Larsen.   Pt denies dental work/jaw pain.  Labs previously collected on 6/12/25.  Treatment administered per MAR and pt monitored for 30 min post and reports feels ok.  Pt discharged with son.

## 2025-07-14 DIAGNOSIS — G89.29 CHRONIC MIDLINE LOW BACK PAIN WITHOUT SCIATICA: ICD-10-CM

## 2025-07-14 DIAGNOSIS — M54.50 CHRONIC MIDLINE LOW BACK PAIN WITHOUT SCIATICA: ICD-10-CM

## 2025-07-14 RX ORDER — HYDROCODONE BITARTRATE AND ACETAMINOPHEN 5; 325 MG/1; MG/1
1 TABLET ORAL EVERY 6 HOURS PRN
Qty: 120 TABLET | Refills: 0 | Status: SHIPPED | OUTPATIENT
Start: 2025-07-14

## 2025-07-14 NOTE — TELEPHONE ENCOUNTER
Caller: Eva Borrego    Relationship: Self    Best call back number: [phone]     Requested Prescriptions:   Requested Prescriptions     Pending Prescriptions Disp Refills    HYDROcodone-acetaminophen (NORCO) 5-325 MG per tablet 120 tablet 0     Sig: Take 1 tablet by mouth Every 6 (Six) Hours As Needed for Severe Pain.        Pharmacy where request should be sent: [facility] [id number] [place] [id number] [address] [facility] [address] [place] [address] - [phone] [facility] [phone]      Last office visit with prescribing clinician: 6/6/2025   Last telemedicine visit with prescribing clinician: Visit date not found   Next office visit with prescribing clinician: Visit date not found     Additional details provided by patient:      Does the patient have less than a 3 day supply:  [x] Yes  [] No    Would you like a call back once the refill request has been completed: [x] Yes [] No    If the office needs to give you a call back, can they leave a voicemail: [x] Yes [] No    Bryce Shaikh Rep   07/14/25 12:34 EDT

## 2025-08-07 ENCOUNTER — OFFICE VISIT (OUTPATIENT)
Dept: FAMILY MEDICINE CLINIC | Facility: CLINIC | Age: 75
End: 2025-08-07
Payer: MEDICARE

## 2025-08-07 VITALS
TEMPERATURE: 98.2 F | DIASTOLIC BLOOD PRESSURE: 63 MMHG | RESPIRATION RATE: 18 BRPM | SYSTOLIC BLOOD PRESSURE: 106 MMHG | WEIGHT: 109 LBS | HEIGHT: 63 IN | OXYGEN SATURATION: 95 % | HEART RATE: 86 BPM | BODY MASS INDEX: 19.31 KG/M2

## 2025-08-07 DIAGNOSIS — E03.9 ACQUIRED HYPOTHYROIDISM: ICD-10-CM

## 2025-08-07 DIAGNOSIS — M33.13 DERMATOMYOSITIS: ICD-10-CM

## 2025-08-07 DIAGNOSIS — M81.0 SENILE OSTEOPOROSIS: ICD-10-CM

## 2025-08-07 DIAGNOSIS — R79.0 LOW MAGNESIUM LEVEL: ICD-10-CM

## 2025-08-07 DIAGNOSIS — R19.7 DIARRHEA, UNSPECIFIED TYPE: Primary | ICD-10-CM

## 2025-08-07 DIAGNOSIS — E78.2 MIXED HYPERLIPIDEMIA: ICD-10-CM

## 2025-08-07 LAB — MAGNESIUM SERPL-MCNC: 1.9 MG/DL (ref 1.6–2.4)

## 2025-08-07 PROCEDURE — 36415 COLL VENOUS BLD VENIPUNCTURE: CPT | Performed by: FAMILY MEDICINE

## 2025-08-07 PROCEDURE — 1159F MED LIST DOCD IN RCRD: CPT | Performed by: FAMILY MEDICINE

## 2025-08-07 PROCEDURE — 99213 OFFICE O/P EST LOW 20 MIN: CPT | Performed by: FAMILY MEDICINE

## 2025-08-07 PROCEDURE — 83735 ASSAY OF MAGNESIUM: CPT | Performed by: FAMILY MEDICINE

## 2025-08-07 PROCEDURE — 1160F RVW MEDS BY RX/DR IN RCRD: CPT | Performed by: FAMILY MEDICINE

## 2025-08-07 PROCEDURE — 1125F AMNT PAIN NOTED PAIN PRSNT: CPT | Performed by: FAMILY MEDICINE

## 2025-08-07 RX ORDER — ALBUTEROL SULFATE 90 UG/1
2 INHALANT RESPIRATORY (INHALATION) EVERY 6 HOURS PRN
Qty: 54 G | Refills: 1 | Status: SHIPPED | OUTPATIENT
Start: 2025-08-07

## 2025-08-07 RX ORDER — LEFLUNOMIDE 10 MG/1
10 TABLET ORAL DAILY
Start: 2025-08-07

## 2025-08-07 RX ORDER — LOPERAMIDE HYDROCHLORIDE 2 MG/1
2 TABLET ORAL 4 TIMES DAILY PRN
Start: 2025-08-07

## 2025-08-15 DIAGNOSIS — M54.50 CHRONIC MIDLINE LOW BACK PAIN WITHOUT SCIATICA: ICD-10-CM

## 2025-08-15 DIAGNOSIS — G89.29 CHRONIC MIDLINE LOW BACK PAIN WITHOUT SCIATICA: ICD-10-CM

## 2025-08-15 RX ORDER — HYDROCODONE BITARTRATE AND ACETAMINOPHEN 5; 325 MG/1; MG/1
1 TABLET ORAL EVERY 6 HOURS PRN
Qty: 120 TABLET | Refills: 0 | Status: SHIPPED | OUTPATIENT
Start: 2025-08-15

## 2025-09-04 ENCOUNTER — ON CAMPUS - OUTPATIENT (AMBULATORY)
Dept: URBAN - METROPOLITAN AREA HOSPITAL 2 | Facility: HOSPITAL | Age: 75
End: 2025-09-04
Payer: MEDICARE

## 2025-09-04 VITALS
HEART RATE: 80 BPM | HEART RATE: 83 BPM | DIASTOLIC BLOOD PRESSURE: 86 MMHG | RESPIRATION RATE: 19 BRPM | SYSTOLIC BLOOD PRESSURE: 146 MMHG | SYSTOLIC BLOOD PRESSURE: 112 MMHG | HEART RATE: 94 BPM | HEART RATE: 81 BPM | TEMPERATURE: 97.6 F | HEIGHT: 63 IN | OXYGEN SATURATION: 88 % | HEART RATE: 82 BPM | OXYGEN SATURATION: 97 % | RESPIRATION RATE: 18 BRPM | SYSTOLIC BLOOD PRESSURE: 104 MMHG | HEART RATE: 95 BPM | RESPIRATION RATE: 14 BRPM | OXYGEN SATURATION: 94 % | DIASTOLIC BLOOD PRESSURE: 60 MMHG | DIASTOLIC BLOOD PRESSURE: 83 MMHG | SYSTOLIC BLOOD PRESSURE: 100 MMHG | RESPIRATION RATE: 17 BRPM | OXYGEN SATURATION: 98 % | DIASTOLIC BLOOD PRESSURE: 62 MMHG | DIASTOLIC BLOOD PRESSURE: 66 MMHG | OXYGEN SATURATION: 92 % | DIASTOLIC BLOOD PRESSURE: 67 MMHG | SYSTOLIC BLOOD PRESSURE: 134 MMHG | RESPIRATION RATE: 16 BRPM | WEIGHT: 102 LBS | HEART RATE: 87 BPM | SYSTOLIC BLOOD PRESSURE: 89 MMHG | SYSTOLIC BLOOD PRESSURE: 135 MMHG

## 2025-09-04 DIAGNOSIS — Z86.0100 PERSONAL HISTORY OF COLON POLYPS, UNSPECIFIED: ICD-10-CM

## 2025-09-04 DIAGNOSIS — K57.30 DIVERTICULOSIS OF LARGE INTESTINE WITHOUT PERFORATION OR ABS: ICD-10-CM

## 2025-09-04 DIAGNOSIS — D12.3 BENIGN NEOPLASM OF TRANSVERSE COLON: ICD-10-CM

## 2025-09-04 DIAGNOSIS — Z09 ENCOUNTER FOR FOLLOW-UP EXAMINATION AFTER COMPLETED TREATMEN: ICD-10-CM

## 2025-09-04 PROBLEM — Z86.010 SURVEILLANCE DUE TO PRIOR COLONIC NEOPLASIA: Status: ACTIVE | Noted: 2025-09-04

## 2025-09-04 LAB
GI HISTOLOGY: A. TRANSVERSE COLON: (no result)
GI HISTOLOGY: PDF REPORT: (no result)

## 2025-09-04 PROCEDURE — 45385 COLONOSCOPY W/LESION REMOVAL: CPT | Mod: PT | Performed by: INTERNAL MEDICINE

## (undated) DEVICE — GLV SURG SENSICARE PI MIC PF SZ7.5 LF STRL

## (undated) DEVICE — SUT ETHLN 3/0 FS1 30IN 669H

## (undated) DEVICE — GLV SURG SENSICARE PI ORTHO SZ8 LF STRL

## (undated) DEVICE — GOWN,REINFORCE,POLY,SIRUS,BREATH SLV,XLG: Brand: MEDLINE

## (undated) DEVICE — SOLUTION,WATER,IRRIGATION,1000ML,STERILE: Brand: MEDLINE

## (undated) DEVICE — PAD,ABDOMINAL,5"X9",STERILE,LF,1/PK: Brand: MEDLINE INDUSTRIES, INC.

## (undated) DEVICE — DECANTER: Brand: UNBRANDED

## (undated) DEVICE — SOL IRRIG NACL 1000ML

## (undated) DEVICE — PAD UNDERCAST WYTEX 4IN 4YD LF STRL

## (undated) DEVICE — CUFF TOURNI 1BLADDER 1PRT 12IN STRL

## (undated) DEVICE — SLV SCD CALF HEMOFORCE DVT THERP REPROC MD

## (undated) DEVICE — SPNG GZ AVANT 6PLY 4X4IN STRL PK/2

## (undated) DEVICE — 3M™ STERI-DRAPE™ U-DRAPE 1015: Brand: STERI-DRAPE™

## (undated) DEVICE — KT SURG TURNOVER 050

## (undated) DEVICE — BNDG ESMARK 4IN 12FT LF STRL BLU

## (undated) DEVICE — SUT VIC 0 CP1 27IN J267H

## (undated) DEVICE — BNDG ELAS ELITE V/CLOSE 4IN 5YD LF STRL

## (undated) DEVICE — STAPLER, SKIN, 35W, A: Brand: MEDLINE INDUSTRIES, INC.

## (undated) DEVICE — BANDAGE,GAUZE,BULKEE II,4.5"X4.1YD,STRL: Brand: MEDLINE

## (undated) DEVICE — DRSNG GZ CURAD XEROFORM PETROLTM OVERWRP 1X8IN STRL

## (undated) DEVICE — APPL CHLORAPREP HI/LITE TINTED 10.5ML ORNG

## (undated) DEVICE — ZIPPERED TOGA, 2X LARGE: Brand: FLYTE

## (undated) DEVICE — SPNG LAP PREWSH SFTPK 18X18IN STRL PK/5

## (undated) DEVICE — UNDYED BRAIDED (POLYGLACTIN 910), SYNTHETIC ABSORBABLE SUTURE: Brand: COATED VICRYL

## (undated) DEVICE — PK EXTREM 50

## (undated) DEVICE — GLV SURG SIGNATURE ESSENTIAL PF LTX SZ8.5

## (undated) DEVICE — GAUZE,SPONGE,FLUFF,6"X6.75",STRL,5/TRAY: Brand: MEDLINE

## (undated) DEVICE — GLV SURG SENSICARE PI LF PF 8 GRN STRL